# Patient Record
Sex: FEMALE | Race: WHITE | Employment: PART TIME | URBAN - METROPOLITAN AREA
[De-identification: names, ages, dates, MRNs, and addresses within clinical notes are randomized per-mention and may not be internally consistent; named-entity substitution may affect disease eponyms.]

---

## 2017-02-27 ENCOUNTER — HOSPITAL ENCOUNTER (OUTPATIENT)
Dept: MAMMOGRAPHY | Age: 72
Discharge: HOME | End: 2017-02-27
Attending: INTERNAL MEDICINE | Admitting: INTERNAL MEDICINE

## 2017-02-27 DIAGNOSIS — Z12.31 ENCOUNTER FOR SCREENING MAMMOGRAM FOR BREAST CANCER: ICD-10-CM

## 2018-03-05 ENCOUNTER — HOSPITAL ENCOUNTER (OUTPATIENT)
Dept: GENERAL RADIOLOGY | Age: 73
Discharge: HOME | End: 2018-03-05
Admitting: INTERNAL MEDICINE

## 2018-03-05 DIAGNOSIS — M81.0 AGE-RELATED OSTEOPOROSIS WITHOUT CURRENT PATHOLOGICAL FRACTURE: ICD-10-CM

## 2018-03-05 DIAGNOSIS — Z12.39 BREAST CANCER SCREENING: ICD-10-CM

## 2018-03-05 DIAGNOSIS — M81.0 AGE RELATED OSTEOPOROSIS, UNSPECIFIED PATHOLOGICAL FRACTURE PRESENCE: ICD-10-CM

## 2019-03-11 ENCOUNTER — HOSPITAL ENCOUNTER (OUTPATIENT)
Dept: WOMENS IMAGING | Age: 74
Discharge: HOME | End: 2019-03-11
Payer: MEDICARE

## 2019-03-11 DIAGNOSIS — Z12.39 BREAST CANCER SCREENING: ICD-10-CM

## 2019-03-11 PROCEDURE — 77063 BREAST TOMOSYNTHESIS BI: CPT

## 2019-10-29 ENCOUNTER — HOSPITAL ENCOUNTER (OUTPATIENT)
Age: 74
Discharge: HOME | End: 2019-10-29
Payer: MEDICARE

## 2019-10-29 ENCOUNTER — HOSPITAL ENCOUNTER (OUTPATIENT)
Dept: GENERAL RADIOLOGY | Age: 74
Discharge: HOME | End: 2019-10-29
Payer: MEDICARE

## 2019-10-29 DIAGNOSIS — M54.50 LOW BACK PAIN, UNSPECIFIED BACK PAIN LATERALITY, UNSPECIFIED CHRONICITY, UNSPECIFIED WHETHER SCIATICA PRESENT: ICD-10-CM

## 2019-10-29 PROCEDURE — 72100 X-RAY EXAM L-S SPINE 2/3 VWS: CPT

## 2020-03-20 ENCOUNTER — HOSPITAL ENCOUNTER (OUTPATIENT)
Dept: WOMENS IMAGING | Age: 75
Discharge: HOME | End: 2020-03-20
Payer: MEDICARE

## 2020-03-20 PROCEDURE — 77063 BREAST TOMOSYNTHESIS BI: CPT

## 2020-05-21 ENCOUNTER — TELEPHONE (OUTPATIENT)
Dept: ENT CLINIC | Age: 75
End: 2020-05-21

## 2020-05-21 NOTE — TELEPHONE ENCOUNTER
Please call Nguyễn Price and schedule an in office visit with me. She was referred, to me, by Dr. Zarina Hernandez and is to be scheduled with me, FAIZANP.

## 2020-05-27 ENCOUNTER — TELEPHONE (OUTPATIENT)
Dept: ENT CLINIC | Age: 75
End: 2020-05-27

## 2020-05-27 ENCOUNTER — OFFICE VISIT (OUTPATIENT)
Dept: ENT CLINIC | Age: 75
End: 2020-05-27
Payer: MEDICARE

## 2020-05-27 VITALS
HEIGHT: 60 IN | TEMPERATURE: 97.3 F | DIASTOLIC BLOOD PRESSURE: 76 MMHG | BODY MASS INDEX: 35.73 KG/M2 | HEART RATE: 76 BPM | SYSTOLIC BLOOD PRESSURE: 124 MMHG | WEIGHT: 182 LBS

## 2020-05-27 PROCEDURE — 4040F PNEUMOC VAC/ADMIN/RCVD: CPT | Performed by: OTOLARYNGOLOGY

## 2020-05-27 PROCEDURE — 99203 OFFICE O/P NEW LOW 30 MIN: CPT | Performed by: OTOLARYNGOLOGY

## 2020-05-27 PROCEDURE — 1036F TOBACCO NON-USER: CPT | Performed by: OTOLARYNGOLOGY

## 2020-05-27 PROCEDURE — G8427 DOCREV CUR MEDS BY ELIG CLIN: HCPCS | Performed by: OTOLARYNGOLOGY

## 2020-05-27 PROCEDURE — 1090F PRES/ABSN URINE INCON ASSESS: CPT | Performed by: OTOLARYNGOLOGY

## 2020-05-27 PROCEDURE — 3017F COLORECTAL CA SCREEN DOC REV: CPT | Performed by: OTOLARYNGOLOGY

## 2020-05-27 PROCEDURE — G8399 PT W/DXA RESULTS DOCUMENT: HCPCS | Performed by: OTOLARYNGOLOGY

## 2020-05-27 PROCEDURE — 1123F ACP DISCUSS/DSCN MKR DOCD: CPT | Performed by: OTOLARYNGOLOGY

## 2020-05-27 PROCEDURE — G8417 CALC BMI ABV UP PARAM F/U: HCPCS | Performed by: OTOLARYNGOLOGY

## 2020-05-27 RX ORDER — DIAZEPAM 2 MG/1
TABLET ORAL
COMMUNITY
Start: 2020-05-21 | End: 2020-06-18 | Stop reason: SDUPTHER

## 2020-05-27 RX ORDER — TRAMADOL HYDROCHLORIDE 50 MG/1
TABLET ORAL
COMMUNITY
Start: 2020-04-13

## 2020-05-27 RX ORDER — PREDNISONE 10 MG/1
TABLET ORAL
Qty: 78 TABLET | Refills: 0 | Status: SHIPPED | OUTPATIENT
Start: 2020-05-27 | End: 2020-07-15 | Stop reason: ALTCHOICE

## 2020-05-27 NOTE — PATIENT INSTRUCTIONS
ENG TESTING INSTRUCTIONS      The inner ear has two main components, one for hearing and one for equilibrium balance. In order to diagnose dizziness, we need to test both components. Toward that end, I have recommended an audiogram (hearing test) and ENG (electronystagmogram), or balance system testing. This testing will take approximately 1.5 to 2 hours. Please be in time as the audiologist runs on schedule him and your test will need to be rescheduled if you arrive after the scheduled starting time. Until your testing is completed and you have a follow up visit, please maintain these dizziness activity precautions:  1. Avoid working at [place], on ladders or scaffolding or near the edges of platforms or using heavy or complicated machinery or operating a motorized vehicle, or any activity where loss of consciousness or equilibrium would be hazardous to yourself or others, if you are experiencing dizziness, and until having been dizzy free for 72 hours. Even then, take appropriate care and precautions as dizziness could occur at any time. 2. Avoid any motions or activity that results in the off balance sensation. Stand up or arise slowly and in stages, as we discussed. YOU MUST NOT TAKE ANY OF THE FOLLOWING MEDICATIONS FOR 48 - 72 HOURS BEFORE YOUR TEST:  · Any medications that can make you drowsy or sleepy  · Allergy pills  · Sedative pills   · Tranquilizers/anti-anxiety medications (Valium, Librium, Xanax, Ativan, etc.)  · Sleeping pills   · Antihistamines/Decongestants (Benadryl, Sudafed, Dimetapp, Chlor-Trimeton)  · Pain pills/Narcotics/Barbiturates (codeine, Demerol, hydrocodone, Norco, Vicodin, oxycodone, Percocet, Percodan, OxyContin, tramadol, phenobarbital, antidepressants)      · Diet pills. · Nerve/muscle relaxant pills (Robaxin, Valium)  · Anti-dizziness pills.   (meclizine, Antivert, Bonine, ear patches, clonazepam/Klonopin, scopolamine/TRANSDERM-SCOP, etc.)  · Anti-nausea

## 2020-05-27 NOTE — PROGRESS NOTES
Diagnosis Date    GERD (gastroesophageal reflux disease)     Hyperlipidemia     Hypertension          Past Surgical History:   Procedure Laterality Date    BREAST SURGERY Right     right breast, calcification removed    HYSTERECTOMY      1995         EXAMINATION:       Vitals:    05/27/20 1327   BP: 124/76   Pulse: 76   Temp: 97.3 °F (36.3 °C)   Weight: 182 lb (82.6 kg)   Height: 5' (1.524 m)     VITALS SIGNS were reviewed. GENERAL APPEARANCE: WDWN NAD, Alert and oriented X 3. ABILITY TO COMMUNICATE/QUALITY OF VOICE:  Normal, no hoarseness or hot potato quality. SALIVARY GLANDS:  Parotid gland and submandibular gland normal bilaterally. INSPECTION, HEAD AND FACE:  Normal with no masses, lesions, tenderness, or deformities. FACIAL STRENGTH, MOTION:  Facial nerve function intact and equal bilaterally for all 5 branches. SINUSES:  Frontal sinuses and maxillary sinuses nontender, bilaterally. (+) HEARING ASSESSMENT:  Hearing was intact to finger rub at the external meatus bilaterally. Tuning fork tests, using a 512 Hz tuning fork, showed the Castillo test was not heard at all to the limit of my elbow pain. The Rinne test showed air conduction greater than bone conduction bilaterally. EXTERNAL EAR/NOSE:  The pinnae, mastoids, and external nose were normal bilaterally. (+) EARS, OTOSCOPY:  The right EAC was occluded by cerumen impaction. The left EAC contained a moderate cerumen accumulation. The left tympanic membrane and external auditory canal appeared to be were normal.  OTOMICROSCOPY:  Cerumen impaction removed from right and left EAC. The external auditory canals appeared to be normal bilaterally. The tympanic membranes appeared to be normal bilaterally, including normal pneumatic mobility. NOSE:  The nasal septum was midline. The inferior turbinates were normal bilaterally. Nasal mucosa and nasal secretions were normal bilaterally.   LIPS, TEETH AND GUMS:  Normal.  OROPHARYNX/ORAL CAVITY:

## 2020-06-17 ENCOUNTER — TELEPHONE (OUTPATIENT)
Dept: ENT CLINIC | Age: 75
End: 2020-06-17

## 2020-06-17 NOTE — TELEPHONE ENCOUNTER
Patient was seen in May, ref to dr Isabel Orozco for testing, she got her HT last week,    She is scheduled for testing with Dr Isabel Orozco July 8,  She has made her f/u appt to see Dr Carita Essex  July 15,      Previously she got her medication for dizziness  from Dr Dario Longo , before coming here    Patient is now out of medication diazepam, she is still having symtoms of vertigo , so Dr Dario Longo said she should get any further meds from Dr Carita Essex,      She started out with Meclizine,  Then she was switched to diazepam  2 mg  , she cuts the pill in half,  And takes it 2 times a day , please advise    [facility]     Pt's [phone]

## 2020-06-18 RX ORDER — DIAZEPAM 2 MG/1
2 TABLET ORAL EVERY 12 HOURS PRN
Qty: 60 TABLET | Refills: 0 | OUTPATIENT
Start: 2020-06-18 | End: 2020-07-18

## 2020-06-18 NOTE — TELEPHONE ENCOUNTER
Patient calling to check on her request    She has an appointment to see Dr Nathalie Beavers on [address],   Please advise    Ankita

## 2020-07-15 ENCOUNTER — OFFICE VISIT (OUTPATIENT)
Dept: ENT CLINIC | Age: 75
End: 2020-07-15
Payer: MEDICARE

## 2020-07-15 VITALS
SYSTOLIC BLOOD PRESSURE: 134 MMHG | HEIGHT: 60 IN | DIASTOLIC BLOOD PRESSURE: 79 MMHG | BODY MASS INDEX: 35.34 KG/M2 | WEIGHT: 180 LBS | HEART RATE: 75 BPM | TEMPERATURE: 97.6 F

## 2020-07-15 PROCEDURE — 4040F PNEUMOC VAC/ADMIN/RCVD: CPT | Performed by: OTOLARYNGOLOGY

## 2020-07-15 PROCEDURE — 99214 OFFICE O/P EST MOD 30 MIN: CPT | Performed by: OTOLARYNGOLOGY

## 2020-07-15 PROCEDURE — 1123F ACP DISCUSS/DSCN MKR DOCD: CPT | Performed by: OTOLARYNGOLOGY

## 2020-07-15 PROCEDURE — 1036F TOBACCO NON-USER: CPT | Performed by: OTOLARYNGOLOGY

## 2020-07-15 PROCEDURE — G8399 PT W/DXA RESULTS DOCUMENT: HCPCS | Performed by: OTOLARYNGOLOGY

## 2020-07-15 PROCEDURE — G8417 CALC BMI ABV UP PARAM F/U: HCPCS | Performed by: OTOLARYNGOLOGY

## 2020-07-15 PROCEDURE — 1090F PRES/ABSN URINE INCON ASSESS: CPT | Performed by: OTOLARYNGOLOGY

## 2020-07-15 PROCEDURE — 3017F COLORECTAL CA SCREEN DOC REV: CPT | Performed by: OTOLARYNGOLOGY

## 2020-07-15 PROCEDURE — G8427 DOCREV CUR MEDS BY ELIG CLIN: HCPCS | Performed by: OTOLARYNGOLOGY

## 2020-07-15 RX ORDER — FLUTICASONE PROPIONATE 50 MCG
SPRAY, SUSPENSION (ML) NASAL
Qty: 1 BOTTLE | Refills: 2 | Status: SHIPPED | OUTPATIENT
Start: 2020-07-15

## 2020-07-15 NOTE — PROGRESS NOTES
[address] [place] [address] ENT         CHIEF COMPLAINT:  Chief Complaint   Patient presents with    Dizziness       INTERVAL HISTORY:        No dizziness for about 2.5 weeks. \"I took diazepam for only one week. Every now and then I feel a little off balance. But no dizziness at all,  that's gone. My hearing is not better. My ear feels stopped up and sometimes when I swallow, it pops, but it doesn't pop all the way. AUDIOGRAM:                     ENG (see full report for details): COUNSELING:    The audiogram results were reviewed and discussed with Erick Barnett, whom I advised of the type of hearing loss, the probable etiology, possible associated impairment and disability, need for noise protection and avoidance, possible benefits of hearing aids, or other amplification therapy. I discussed the etiology of tinnitus and treatment/coping measures including masking techniques, and need for annual and prn hearing tests. Patient was advised of difficulty understanding speech in the presence of moderate to high volume background noise. The ENG results were reviewed and discussed with Erick Barnett, whom I advised of the significance of the ENG findings. I discussed the functions of the vestibular system and the probable etiology and diagnosis, vestibular dysfunction and impairment and possible disability. I discussed vestibular exercises, and possible benefits of vestibular rehabilitation therapy. I discussed activity and safety precautions, fall prevention and avoidance, and need for continued follow-up. I discussed possible etiologies of dizziness including, but not limited to: Meniere's disease, vestibular neuronitis, acute labyrinthitis, idiopathic peripheral vestibular dysfunction, benign paroxysmal positional vertigo, autoimmune hearing loss and vestibular dysfunction, multiple sclerosis, vestibular schwannoma (acoustic neuroma), CNS neoplasm, and other CNS disease.     I discussed vestibular schwannoma (acoustic neuroma), signs, symptoms, possible progression with loss of hearing, dizziness, facial nerve paralysis and compression of brain stem, and thus the need for an MRI scan of the IACs/brain to investigate for this entity. IMPRESSION / Nii Haydener / Violetta Radford / PROCEDURES:       Lynn Lepe was seen today for dizziness. Diagnoses and all orders for this visit:    Asymmetrical right sensorineural hearing loss  -     MRI IAC POSTERIOR FOSSA W WO CONTRAST    ETD (Eustachian tube dysfunction), right  -     fluticasone (FLONASE) 50 MCG/ACT nasal spray; 2 sprays in each nostril daily. Bilateral sensorineural hearing loss         RECOMMENDATIONS / PLAN:   1. See Patient Instructions on file for this visit, which were fully discussed with the patient. 2. Return in about 6 weeks (around 8/26/2020) for recheck/follow-up, and sooner if condition worsens. TIME:    Total visit time = 25 minutes; Counseling time = 15 minutes.

## 2020-07-15 NOTE — PATIENT INSTRUCTIONS
· I recommend an MRI scan of the IACs/brain, with contrast, to rule out a vestibular schwannoma (\"acoustic neuroma\") or central nervous system disease as the reason for your unilateral or asymmetric sensorineural hearing loss, tinnitus, or dizziness. There are adverse consequences of untreated acoustic neuroma, i.e. Total loss of hearing, paralysis of the face, permanent dizziness or pressure on the brain. This may occur suddenly due to bleeding into the tumor or sudden growth. · You should consider amplification therapy, hearing aid, if you are having difficulty communicating and/or hearing important sounds. I recommend bilateral hearing aids for aural rehabilitation and to aid in restoring functional hearing. You may follow up with Dr. Yohana Danielle or with another hearing aid provider of your choice. · You should return for an annual hearing test to monitor your hearing, and whenever your hearing further decreases significantly. · You should avoid exposure to excessively high levels of noise/sound and use hearing protection measures we discussed, as needed, if such exposure is unavoidable. · NO Q-TIPS IN THE EARS  You should never clean your ears with a Q-tip, cotton tipped applicator, Concepcion pin, paper clip, or any other instrument. I recommend only use of one the several ear wax removal kits available \"over the counter\" (eg. [employer] or [employer], or [employer]) if you feel a need to try to remove ear wax. No other methods should be self used for this purpose as there is danger of injury to the ear and risk of irreparable and irreversible permanent hearing loss. HOME INSTRUCTIONS - EUSTACHIAN TUBE DYSFUNCTION    Please do the following to attempt to improve your Eustachian tube dysfunction  and/or to help to prevent fluid in your middle ear:  1. Auto inflate your ears as instructed ( hold your nose closed, with your mouth closed and blow out as if blowing ear into or out of ears.   If not care for yourself at home? · To ease ear pain, apply a warm washcloth or a heating pad set on low. There may be some drainage from the ear when the heat melts earwax. Put a cloth between the heat source and your skin. Do not use a heating pad with children. · If your doctor prescribed antibiotics, take them as directed. Do not stop taking them just because you feel better. You need to take the full course of antibiotics. · Your doctor may recommend over-the-counter medicine. Be safe with medicines. Oral or nasal decongestants may relieve ear pain. Avoid decongestants that are combined with antihistamines, which tend to cause more blockage. But if allergies seem to be the problem, your doctor may recommend a combination. Be careful with cough and cold medicines. Don't give them to children younger than 6, because they don't work for children that age and can even be harmful. For children 6 and older, always follow all the instructions carefully. Make sure you know how much medicine to give and how long to use it. And use the dosing device if one is included. When should you call for help? Call your doctor now or seek immediate medical care if:  · You develop sudden, complete hearing loss. · You have severe pain or feel dizzy. · You have new or increasing pus or blood draining from your ear. · You have redness, swelling, or pain around or behind the ear. Watch closely for changes in your health, and be sure to contact your doctor if:  · You do not get better after 2 weeks. · You have any new symptoms, such as itching or a feeling of fullness in the ear. Where can you learn more? Go to https://[employer]. org and sign in to your [employer] account. Enter Y822 in the [employer] box to learn more about \"Eustachian Tube Problems: Care Instructions. \"     If you do not have an account, please click on the \"Sign Up Now\" link.   Current as of: July 29, 2019               Content

## 2020-07-21 ENCOUNTER — HOSPITAL ENCOUNTER (OUTPATIENT)
Age: 75
Discharge: HOME | End: 2020-07-21
Payer: MEDICARE

## 2020-07-21 LAB
BUN BLDV-MCNC: 17 MG/DL (ref 7–20)
CREAT SERPL-MCNC: 1 MG/DL (ref 0.6–1.2)
GFR AFRICAN AMERICAN: >60
GFR NON-AFRICAN AMERICAN: 54

## 2020-07-21 PROCEDURE — 36415 COLL VENOUS BLD VENIPUNCTURE: CPT

## 2020-07-21 PROCEDURE — 84520 ASSAY OF UREA NITROGEN: CPT

## 2020-07-21 PROCEDURE — 82565 ASSAY OF CREATININE: CPT

## 2020-07-30 ENCOUNTER — HOSPITAL ENCOUNTER (OUTPATIENT)
Dept: MRI IMAGING | Age: 75
Discharge: HOME | End: 2020-07-30
Payer: MEDICARE

## 2020-07-30 PROCEDURE — A9579 GAD-BASE MR CONTRAST NOS,1ML: HCPCS | Performed by: OTOLARYNGOLOGY

## 2020-07-30 PROCEDURE — 70553 MRI BRAIN STEM W/O & W/DYE: CPT

## 2020-07-30 PROCEDURE — 6360000004 HC RX CONTRAST MEDICATION: Performed by: OTOLARYNGOLOGY

## 2020-07-30 RX ADMIN — GADOTERIDOL 15 ML: 279.3 INJECTION, SOLUTION INTRAVENOUS at 15:04

## 2020-08-19 ENCOUNTER — OFFICE VISIT (OUTPATIENT)
Dept: ENT CLINIC | Age: 75
End: 2020-08-19
Payer: MEDICARE

## 2020-08-19 VITALS — DIASTOLIC BLOOD PRESSURE: 78 MMHG | HEART RATE: 76 BPM | TEMPERATURE: 96.8 F | SYSTOLIC BLOOD PRESSURE: 137 MMHG

## 2020-08-19 PROCEDURE — 3017F COLORECTAL CA SCREEN DOC REV: CPT | Performed by: OTOLARYNGOLOGY

## 2020-08-19 PROCEDURE — 1123F ACP DISCUSS/DSCN MKR DOCD: CPT | Performed by: OTOLARYNGOLOGY

## 2020-08-19 PROCEDURE — 1090F PRES/ABSN URINE INCON ASSESS: CPT | Performed by: OTOLARYNGOLOGY

## 2020-08-19 PROCEDURE — 4040F PNEUMOC VAC/ADMIN/RCVD: CPT | Performed by: OTOLARYNGOLOGY

## 2020-08-19 PROCEDURE — 99214 OFFICE O/P EST MOD 30 MIN: CPT | Performed by: OTOLARYNGOLOGY

## 2020-08-19 PROCEDURE — G8399 PT W/DXA RESULTS DOCUMENT: HCPCS | Performed by: OTOLARYNGOLOGY

## 2020-08-19 PROCEDURE — G8427 DOCREV CUR MEDS BY ELIG CLIN: HCPCS | Performed by: OTOLARYNGOLOGY

## 2020-08-19 PROCEDURE — G8417 CALC BMI ABV UP PARAM F/U: HCPCS | Performed by: OTOLARYNGOLOGY

## 2020-08-19 PROCEDURE — 1036F TOBACCO NON-USER: CPT | Performed by: OTOLARYNGOLOGY

## 2020-08-19 NOTE — PATIENT INSTRUCTIONS
Use Debrox (over the counter medication) 4 drops in both ears three times a day for 4 days before returning for ear cleaning.

## 2020-08-19 NOTE — PROGRESS NOTES
nontender, bilaterally. LIPS, TEETH, AND GUMS:   Normal.     OROPHARYNX/ORAL CAVITY:  Oral mucosa, hard and soft palates, tongue, and pharynx were normal.      (+) MIRROR EXAM OF NASOPHARYNX:  Visualization was suboptimal due to a strong gag reflex and guarding. No masses or polyps were appreciated. But, I was unable to visualize the nasopharynx adequately. NECK:  No masses or tenderness. The laryngeal cartilages and hyoid bone were normal, with normal laryngeal crepitus. No abnormal appearance, asymmetry or abnormal tracheal position. PALPATION OF LYMPH NODES, CERVICAL, FACIAL AND SUPRACLAVICULAR:  No lymphadenopathy. REVIEW OF IMAGES:       I independently reviewed the images of the MRI scan of the IACs/brain/head, showing no evidence of acoustic neuroma. See images and report for details. Narrative    EXAMINATION:    MRI OF THE BRAIN AND INTERNAL AUDITORY CANALS WITH AND WITHOUT CONTRAST    7/30/2020 1:44 pm      Impression    1. No acute intracranial abnormality.  No acute infarct. 2. No acute abnormality identified within the cerebellopontine angle cisterns    or internal auditory canals. 3. Minimal global parenchymal volume loss with minimal chronic microvascular    ischemic changes.               IMPRESSION / DIAGNOSES / ORDERS:       Rhianna Jenkins was seen today for dizziness. Diagnoses and all orders for this visit:    Subjective tinnitus of right ear  Comments:  popping in ear. Ear pressure, right    Bilateral sensorineural hearing loss    Asymmetrical right sensorineural hearing loss    Dizziness         RECOMMENDATIONS / PLAN:    1. Consider PE tube in right TM. 2. Consider diuretic for probable Ménière's disease. 3. Return for nasopharyngoscopy and ear cleaning if needed. Omer Good

## 2020-08-26 ENCOUNTER — OFFICE VISIT (OUTPATIENT)
Dept: ENT CLINIC | Age: 75
End: 2020-08-26
Payer: MEDICARE

## 2020-08-26 VITALS — DIASTOLIC BLOOD PRESSURE: 82 MMHG | HEART RATE: 74 BPM | TEMPERATURE: 96.9 F | SYSTOLIC BLOOD PRESSURE: 137 MMHG

## 2020-08-26 PROCEDURE — 69210 REMOVE IMPACTED EAR WAX UNI: CPT | Performed by: OTOLARYNGOLOGY

## 2020-08-26 PROCEDURE — 92511 NASOPHARYNGOSCOPY: CPT | Performed by: OTOLARYNGOLOGY

## 2020-08-26 RX ORDER — TRIAMTERENE AND HYDROCHLOROTHIAZIDE 37.5; 25 MG/1; MG/1
1 CAPSULE ORAL DAILY
Qty: 30 CAPSULE | Refills: 3 | Status: SHIPPED | OUTPATIENT
Start: 2020-08-26

## 2020-08-26 NOTE — PATIENT INSTRUCTIONS
ADVERSE AND SIDE EFFECTS OF MEDICATIONS:    Please be aware of the following possible adverse reactions, side effects, and complications of the following medications, including, but not limited to: allergic reaction, interactions with other medications, nausea, headache, diarrhea, persistent symptoms, failure to improve, and the following:      Dyazide:  Low potassium. Ans see medication information in this AVS.    ~~>>> Also please read all information given to you by the pharmacist.       Patient Education        hydrochlorothiazide and triamterene  Pronunciation:  SAM harvey klor oh THY a zide and trye AM ter een  Brand:  Dyazide, Maxzide  What is the most important information I should know about hydrochlorothiazide and triamterene? You should not use this medicine if have kidney disease, urination problems, high levels of potassium in your blood, or if you are taking other diuretics similar to triamterene. Do not use potassium supplements, salt substitutes, or low-sodium milk unless your doctor has told you to. This medicine can raise your blood potassium to dangerous levels, especially if you have kidney disease, diabetes, severe illness, or if you are an older adult. Call your doctor right away if you have signs of high potassium: nausea, slow or unusual heart rate, numbness, tingling, muscle weakness, or loss of movement in any part of your body. What is hydrochlorothiazide and triamterene? Hydrochlorothiazide is a thiazide diuretic (water pill) that helps prevent your body from absorbing too much salt, which can cause fluid retention. Triamterene is a potassium-sparing diuretic that also prevents your body from absorbing too much salt and keeps your potassium levels from getting too low. Hydrochlorothiazide and triamterene is a combination medicine used to treat fluid retention (edema) and high blood pressure (hypertension).   This medicine is usually given to people in whom other diuretics have caused hypokalemia (low potassium levels in your blood). Hydrochlorothiazide and triamterene may also be used for purposes not listed in this medication guide. What should I discuss with my healthcare provider before taking hydrochlorothiazide and triamterene? You should not use this medicine if you are allergic to hydrochlorothiazide (HCTZ, HydroDiuril, Lotensin HCT, Zestoretic, and others) or triamterene (Dyrenium), or if:  · you have kidney disease or are unable to urinate;  · you have high potassium levels (hyperkalemia);  · you are taking diuretics similar to triamterene, such as amiloride (Midamor, Moduretic), spironolactone (Aldactone, Aldactazide); or  · you are taking potassium supplements (unless your doctor tells you to). Diuretics such as triamterene can raise your blood potassium to dangerous levels. This is more likely to occur if you have kidney disease, diabetes, severe illness, or if you are an older adult. Ask your doctor about your individual risk. To make sure this medicine is safe for you, tell your doctor if you have:  · diabetes;  · cirrhosis or other liver disease;  · heart disease, heart rhythm disorder;  · gout;  · if you are on a low-salt diet;  · a history of cataracts or glaucoma;  · a history of kidney stones; or  · an allergy to sulfa drugs or penicillin. It is not known whether this medicine will harm an unborn baby. Tell your doctor if you are pregnant or plan to become pregnant. Hydrochlorothiazide and triamterene can pass into breast milk and may harm a nursing baby. You should not breast-feed while using this medicine. Hydrochlorothiazide and triamterene is not approved for use by anyone younger than 25years old. How should I take hydrochlorothiazide and triamterene? Follow all directions on your prescription label. Your doctor may occasionally change your dose. Do not use this medicine in larger or smaller amounts or for longer than recommended.   Hydrochlorothiazide and triamterene is usually taken once per day. You will need frequent blood tests to measure your potassium levels while taking this medicine,  especially when you first start taking this medicine or when your doses are changed. You may not any symptoms, but your blood work will help your doctor determine if you have high potassium (hyperkalemia). Your heart function may also need to be checked using an electrocardiograph or ECG (sometimes called an EKG). Severe illness can affect your potassium levels. Call your doctor if you have a serious illness, injury, or medical emergency. If you need surgery or medical tests, tell the doctor ahead of time that you are taking medicine that contains hydrochlorothiazide. You may need to stop using the medicine for a short time. If you are being treated for high blood pressure, keep using this medicine even if you feel well. High blood pressure often has no symptoms. You may need to use blood pressure medicine for the rest of your life. Store at room temperature away from moisture, heat, and light. What happens if I miss a dose? Take the missed dose as soon as you remember. Skip the missed dose if it is almost time for your next scheduled dose. Do not  take extra medicine to make up the missed dose. What happens if I overdose? Seek emergency medical attention or call the Poison Help line at 1-509.306.4199. Overdose symptoms may include increased urination, nausea, vomiting, weakness, fever, warmth or flushing in your face, or muscle spasms. What should I avoid while taking hydrochlorothiazide and triamterene? Do not use potassium supplements, salt substitutes, or low-sodium milk while you are taking hydrochlorothiazide and triamterene, unless your doctor has told you to. Avoid becoming overheated or dehydrated during exercise and in hot weather. Follow your doctor's instructions about the type and amount of liquids you should drink.  In some cases, drinking too much liquid can be as unsafe as not drinking enough. This medicine may impair your thinking or reactions. Be careful if you drive or do anything that requires you to be alert. Avoid a diet high in salt. Too much salt will cause your body to retain water and can make this medication less effective. What are the possible side effects of hydrochlorothiazide and triamterene? Get emergency medical help if you have signs of an allergic reaction: hives; difficulty breathing; swelling of your face, lips, tongue, or throat. Stop taking this medicine and call your doctor at once if you have:  · blurred vision, tunnel vision, eye pain, or seeing halos around lights;  · a light-headed feeling, like you might pass out;  · fast, slow, or uneven heart rate;  · dark urine, dameon-colored stools, jaundice (yellowing of the skin or eyes);  · severe pain in your upper stomach spreading to your back, nausea and vomiting;  · fever, sore throat, and headache with a severe blistering, peeling, and red skin rash;  · high potassium --nausea, slow or unusual heart rate, numbness, tingling, muscle weakness, loss of movement in any part of your body;  · low potassium --leg cramps, constipation, irregular heartbeats, fluttering in your chest, increased thirst or urination, numbness or tingling, muscle weakness or limp feeling;  · other signs of an electrolyte imbalance --thirst, dry mouth, stomach pain, drowsiness, weakness, fast heart rate, muscle pain or weakness, feeling restless or light-headed;  · kidney problems --little or no urination, painful or difficult urination, swelling in your feet or ankles, feeling tired or short of breath; or  · lupus-like syndrome --joint pain or swelling with fever, swollen glands, muscle aches, chest pain, unusual thoughts or behavior, and patchy skin color. Common side effects may include:  · stomach pain, nausea, diarrhea, constipation;  · dizziness, headache;  · blurred vision; or  · dry mouth.   This is not a complete list of side effects and others may occur. Call your doctor for medical advice about side effects. You may report side effects to FDA at 3-458-FBP-4760. What other drugs can affect hydrochlorothiazide and triamterene? Tell your doctor about all your current medicines and any you start or stop using, especially:  · all your blood pressure medications;  · amphotericin B;  · chlorpropamide;  · digoxin;  · laxatives;  · lithium;  · methenamine;  · a blood thinner;  · oral diabetes medication;  · steroid medication (prednisone and others);  · an ACE inhibitor --benazepril, captopril, enalapril, fosinopril, lisinopril, moexipril, perindopril, quinapril, ramipril, trandolapril; or  · NSAIDs (nonsteroidal anti-inflammatory drugs) --aspirin, ibuprofen (Advil, Motrin), naproxen (Aleve), celecoxib, diclofenac, indomethacin, meloxicam, and others. This list is not complete. Other drugs may interact with hydrochlorothiazide and triamterene, including prescription and over-the-counter medicines, vitamins, and herbal products. Not all possible interactions are listed in this medication guide. Where can I get more information? Your pharmacist can provide more information about hydrochlorothiazide and triamterene. Remember, keep this and all other medicines out of the reach of children, never share your medicines with others, and use this medication only for the indication prescribed. Every effort has been made to ensure that the information provided by [address] is accurate, up-to-date, and complete, but no guarantee is made to that effect. Drug information contained herein may be time sensitive. [facility] information has been compiled for use by healthcare practitioners and consumers in the [place] and therefore [facility] does not warrant that uses outside of the [place] are appropriate, unless specifically indicated otherwise.  [facility]'s drug information does not endorse drugs, diagnose patients or recommend therapy. [facility]'s drug information is an informational resource designed to assist licensed healthcare practitioners in caring for their patients and/or to serve consumers viewing this service as a supplement to, and not a substitute for, the expertise, skill, knowledge and judgment of healthcare practitioners. The absence of a warning for a given drug or drug combination in no way should be construed to indicate that the drug or drug combination is safe, effective or appropriate for any given patient. [facility] does not assume any responsibility for any aspect of healthcare administered with the aid of information [facility] provides. The information contained herein is not intended to cover all possible uses, directions, precautions, warnings, drug interactions, allergic reactions, or adverse effects. If you have questions about the drugs you are taking, check with your doctor, nurse or pharmacist.  Copyright 4426-9708 [address]. Version: 11.01. Revision date: 2/17/2017. Care instructions adapted under license by [facility] ([facility]). If you have questions about a medical condition or this instruction, always ask your healthcare professional. [employer] disclaims any warranty or ambulate about the outer part liability for your use of this information.

## 2020-08-26 NOTE — PROGRESS NOTES
instrumentation, using a Billeau wire loop and a Wells suction. After successful cerumen removal, the EACs appeared to be normal and clear bilaterally without mass, exudate, or edema. The tympanic membranes appeared to be normal. There was no evidence of acute disease. [facility] reported improved hearing, back to usual level, after cerumen removal.            IMPRESSION / Terence Grounds / Marcelino Bombard / PROCEDURES:       [facility] was seen today for ear problem. 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 Orders:  -     triamterene-hydroCHLOROthiazide (DYAZIDE) 37.5-25 MG per capsule; Take 1 capsule by mouth daily    Bilateral sensorineural hearing loss         RECOMMENDATIONS / PLAN:   1. Dyazide, if okay with Dr. Biju Carrero. 2. See Patient Instructions on file for this visit. 3. Return in about 6 weeks (around 10/7/2020) for recheck/follow-up, and sooner if condition worsens.

## 2020-08-27 ENCOUNTER — TELEPHONE (OUTPATIENT)
Dept: ENT CLINIC | Age: 75
End: 2020-08-27

## 2020-08-27 PROBLEM — H91.21 SUDDEN IDIOPATHIC HEARING LOSS OF RIGHT EAR WITH RESTRICTED HEARING OF LEFT EAR: Status: ACTIVE | Noted: 2020-08-27

## 2020-08-27 PROBLEM — H93.8X1 EAR PRESSURE, RIGHT: Chronic | Status: ACTIVE | Noted: 2020-08-27

## 2020-08-27 PROBLEM — H90.3 BILATERAL SENSORINEURAL HEARING LOSS: Status: ACTIVE | Noted: 2020-08-27

## 2020-08-27 NOTE — TELEPHONE ENCOUNTER
Pt spoke to  PCP about taking water pills, the doctor would like to know how many mg the pill and how frequently it will be taken so she can decide wether or not the pt can take it

## 2020-08-31 ENCOUNTER — TELEPHONE (OUTPATIENT)
Dept: ENT CLINIC | Age: 75
End: 2020-08-31

## 2020-09-20 PROBLEM — D37.05 NEOPLASM OF UNCERTAIN BEHAVIOR OF NASOPHARYNX: Status: ACTIVE | Noted: 2020-09-20

## 2020-09-20 PROBLEM — H69.93 EUSTACHIAN TUBE DYSFUNCTION, BILATERAL: Status: ACTIVE | Noted: 2020-09-20

## 2020-09-20 PROBLEM — H90.0 CONDUCTIVE HEARING LOSS OF BOTH EARS: Status: ACTIVE | Noted: 2020-09-20

## 2020-09-20 PROBLEM — H69.83 EUSTACHIAN TUBE DYSFUNCTION, BILATERAL: Status: ACTIVE | Noted: 2020-09-20

## 2020-09-20 PROBLEM — H61.23 IMPACTED CERUMEN OF BOTH EARS: Status: ACTIVE | Noted: 2020-09-20

## 2021-05-06 ENCOUNTER — HOSPITAL ENCOUNTER (OUTPATIENT)
Dept: WOMENS IMAGING | Age: 76
Discharge: HOME | End: 2021-05-06
Payer: MEDICARE

## 2021-05-06 DIAGNOSIS — Z12.31 VISIT FOR SCREENING MAMMOGRAM: ICD-10-CM

## 2021-05-06 PROCEDURE — 77063 BREAST TOMOSYNTHESIS BI: CPT

## 2021-06-15 ENCOUNTER — CLINICAL DOCUMENTATION (OUTPATIENT)
Dept: OTHER | Age: 76
End: 2021-06-15

## 2021-08-03 ENCOUNTER — HOSPITAL ENCOUNTER (OUTPATIENT)
Dept: CT IMAGING | Age: 76
Discharge: HOME | End: 2021-08-03
Payer: MEDICARE

## 2021-08-03 DIAGNOSIS — M48.061 SPINAL STENOSIS, LUMBAR REGION, WITHOUT NEUROGENIC CLAUDICATION: ICD-10-CM

## 2021-08-03 PROCEDURE — 72131 CT LUMBAR SPINE W/O DYE: CPT

## 2022-01-26 ENCOUNTER — HOSPITAL ENCOUNTER (EMERGENCY)
Age: 77
Discharge: HOME | End: 2022-01-26
Payer: MEDICARE

## 2022-01-26 VITALS
TEMPERATURE: 98.3 F | HEIGHT: 61 IN | HEART RATE: 91 BPM | SYSTOLIC BLOOD PRESSURE: 128 MMHG | BODY MASS INDEX: 32.1 KG/M2 | WEIGHT: 170 LBS | DIASTOLIC BLOOD PRESSURE: 85 MMHG | OXYGEN SATURATION: 96 % | RESPIRATION RATE: 18 BRPM

## 2022-01-26 DIAGNOSIS — R04.0 EPISTAXIS: Primary | ICD-10-CM

## 2022-01-26 PROCEDURE — 2500000003 HC RX 250 WO HCPCS

## 2022-01-26 PROCEDURE — 99285 EMERGENCY DEPT VISIT HI MDM: CPT

## 2022-01-26 PROCEDURE — 30901 CONTROL OF NOSEBLEED: CPT

## 2022-01-26 PROCEDURE — 6370000000 HC RX 637 (ALT 250 FOR IP)

## 2022-01-26 RX ORDER — ONDANSETRON 4 MG/1
TABLET, ORALLY DISINTEGRATING ORAL
Status: COMPLETED
Start: 2022-01-26 | End: 2022-01-26

## 2022-01-26 RX ORDER — TRANEXAMIC ACID 100 MG/ML
INJECTION, SOLUTION INTRAVENOUS
Status: COMPLETED
Start: 2022-01-26 | End: 2022-01-26

## 2022-01-26 RX ORDER — OXYMETAZOLINE HYDROCHLORIDE 0.05 G/100ML
SPRAY NASAL
Status: COMPLETED
Start: 2022-01-26 | End: 2022-01-26

## 2022-01-26 RX ADMIN — ONDANSETRON 8 MG: 4 TABLET, ORALLY DISINTEGRATING ORAL at 22:37

## 2022-01-26 RX ADMIN — TRANEXAMIC ACID: 1 INJECTION, SOLUTION INTRAVENOUS at 22:37

## 2022-01-26 RX ADMIN — OXYMETAZOLINE HYDROCHLORIDE: 5 SPRAY NASAL at 22:37

## 2022-01-27 NOTE — ED NOTES
Bed: [facility]-04  Expected date:   Expected time:   Means of arrival:   Comments:  Leonardo Montemayor RN  01/26/22 2581

## 2022-01-27 NOTE — ED PROVIDER NOTES
[address] [facility]        Pt Name: Erasmo Luna  MRN: [id number]  [place] 1945  Date of evaluation: 1/26/2022  Provider: Scooter Gayle PA-C  PCP: Bailey Mathews MD  Note Started: 11:09 PM EST       VIJAY. I have evaluated this patient. My supervising physician was available for consultation. CHIEF COMPLAINT       Chief Complaint   Patient presents with    Epistaxis     From home via [facility]. Nosebleed X3 hours, running down back of throat, bleeding from left eye. Vomited some blood clots prior to arrival. No blood thinners other than daily ibuprofen. Hx of HTN. HISTORY OF PRESENT ILLNESS   (Location, Timing/Onset, Context/Setting, Quality, Duration, Modifying Factors, Severity, Associated Signs and Symptoms)  Note limiting factors. Chief Complaint: Epistaxis    Erasmo Luna is a 77 y.o. female who presents to the emergency department with a chief complaint of epistaxis. This began after she blew her nose about 3 hours before presenting to the emergency department. She denies any pain, injury or trauma. States she is feeling slightly nauseous and has been spitting up some blood clots. Denies actual vomiting. Denies shortness of breath, chest pain, dizziness, hematuria, bloody stools or any bleeding anywhere else. Denies pain or any other symptoms. She states this is all coming from the left and actually some was coming out of her left eye. Nursing Notes were all reviewed and agreed with or any disagreements were addressed in the HPI. REVIEW OF SYSTEMS    (2-9 systems for level 4, 10 or more for level 5)     Review of Systems    Positives and Pertinent negatives as per HPI. Except as noted above in the ROS, all other systems were reviewed and negative.        PAST MEDICAL HISTORY     Past Medical History:   Diagnosis Date    GERD (gastroesophageal reflux disease)     Hyperlipidemia     Hypertension SURGICAL HISTORY     Past Surgical History:   Procedure Laterality Date    BREAST SURGERY Right     right breast, calcification removed    HYSTERECTOMY      1995         CURRENTMEDICATIONS       Previous Medications    AMLODIPINE (NORVASC) 5 MG TABLET    Take 1 tablet by mouth daily. CHOLECALCIFEROL (VITAMIN D3 PO)    Take by mouth    FERROUS SULFATE (IRON PO)    Take by mouth    FLUTICASONE (FLONASE) 50 MCG/ACT NASAL SPRAY    2 sprays in each nostril daily. GABAPENTIN PO    Take by mouth    IBUPROFEN (ADVIL;MOTRIN) 800 MG TABLET    Take 800 mg by mouth every 6 hours as needed. LATANOPROST OP    Apply to eye    LISINOPRIL (PRINIVIL;ZESTRIL) 20 MG TABLET    Take 40 mg by mouth nightly. LORATADINE 10 MG CAPS    Take 0.5 tablets by mouth as needed. MAGNESIUM OXIDE PO    Take by mouth    OMEPRAZOLE (PRILOSEC) 20 MG CAPSULE    Take 1 capsule by mouth 2 times daily. PREDNISONE PO    Take by mouth    TERAZOSIN HCL PO    Take by mouth    TRAMADOL (ULTRAM) 50 MG TABLET        TRAZODONE (DESYREL) 100 MG TABLET    Take 100 mg by mouth nightly.     TRIAMTERENE-HYDROCHLOROTHIAZIDE (DYAZIDE) 37.5-25 MG PER CAPSULE    Take 1 capsule by mouth daily         ALLERGIES     No known allergies    FAMILYHISTORY       Family History   Problem Relation Age of Onset    High Cholesterol Mother     Glaucoma Mother     Stroke Mother           SOCIAL HISTORY       Social History     Tobacco Use    Smoking status: Never Smoker    Smokeless tobacco: Never Used   Vaping Use    Vaping Use: Never used   Substance Use Topics    Alcohol use: Not Currently    Drug use: No       SCREENINGS    [place] Coma Scale  Eye Opening: Spontaneous  Best Verbal Response: Oriented  Best Motor Response: Obeys commands  [place] Coma Scale Score: 15        PHYSICAL EXAM    (up to 7 for level 4, 8 or more for level 5)     ED Triage Vitals [01/26/22 2159]   BP Temp Temp Source Pulse Resp SpO2 Height Weight   128/85 98.3 °F (36.8 °C) Oral 91 18 96 % 5' 1\" (1.549 m) 170 lb (77.1 kg)       Physical Exam  Vitals and nursing note reviewed. Constitutional:       Appearance: She is well-developed. She is not diaphoretic. HENT:      Head: Atraumatic. Nose:      Comments: There are some blood noted bilateral nostrils. Clip was removed and there is some blood actually comes from the right nostril. Eyes:      General:         Right eye: No discharge. Left eye: No discharge. Cardiovascular:      Rate and Rhythm: Normal rate and regular rhythm. Heart sounds: Normal heart sounds. No murmur heard. No gallop. Pulmonary:      Effort: Pulmonary effort is normal.      Breath sounds: Normal breath sounds. No stridor. No wheezing, rhonchi or rales. Musculoskeletal:      Cervical back: Normal range of motion. Skin:     General: Skin is warm and dry. Findings: No erythema or rash. Neurological:      Mental Status: She is alert and oriented to person, place, and time. Cranial Nerves: No cranial nerve deficit. Psychiatric:         Behavior: Behavior normal.         DIAGNOSTIC RESULTS   LABS:    Labs Reviewed - No data to display    When ordered only abnormal lab results are displayed. All other labs were within normal range or not returned as of this dictation. EKG: When ordered, EKG's are interpreted by the Emergency Department Physician in the absence of a cardiologist.  Please see their note for interpretation of EKG. RADIOLOGY:   Non-plain film images such as CT, Ultrasound and MRI are read by the radiologist. Plain radiographic images are visualized and preliminarily interpreted by the ED Provider with the below findings:        Interpretation per the Radiologist below, if available at the time of this note:    No orders to display     No results found. PROCEDURES   Unless otherwise noted below, none     Procedures   Verbal consent was obtained from patient for epistaxis management.   2 sprays of Afrin were placed in both nostrils and patient blew her nose evacuating clots from both nostrils. Another spray was placed in both nostrils bilaterally and again patient evacuated some more clots. Some TXA then was sprayed in bilateral nostrils. She does have some bleeding noted in the posterior pharynx but I do not visualize any active bleeding now moderate either nostril. Nose clip was placed for 20 minutes. This was taken off and she has no further bleeding. She was observed for another 30 minutes after this and still has no further nosebleed. There is a very small clot noted to the posterior pharynx but no active bleeding. I ambulated the patient and she has no further bleeding is feeling fine at this time. Good hemostasis. CRITICAL CARE TIME       CONSULTS:  None      EMERGENCY DEPARTMENT COURSE and DIFFERENTIAL DIAGNOSIS/MDM:   Vitals:    Vitals:    01/26/22 2159   BP: 128/85   Pulse: 91   Resp: 18   Temp: 98.3 °F (36.8 °C)   TempSrc: Oral   SpO2: 96%   Weight: 170 lb (77.1 kg)   Height: 5' 1\" (1.549 m)       Patient was given the following medications:  Medications   oxymetazoline (AFRIN) 0.05 % nasal spray (has no administration in time range)   tranexamic acid (CYKLOKAPRON) 1000 MG/10ML injection (has no administration in time range)   ondansetron (ZOFRAN-ODT) 4 MG disintegrating tablet (has no administration in time range)           Patient presented with epistaxis. Initially was planning for Rhino Rocket placement but after Afrin and TXA she has no further bleeding. She states most of the bleeding was coming from the left side but she did have clots her to both right and left nostril here. She does not have any bleeding for an hour after medication has been given without any clip. She has been able to walk around here. She will follow-up with ENT. She is educated on what to do if bleeding begins again. Return here for any worsening of symptoms or problems at home.     FINAL IMPRESSION      1. Epistaxis          DISPOSITION/PLAN   DISPOSITION Decision To Discharge 01/26/2022 11:34:18 PM      PATIENT REFERRED TO:  Nick Ochoa,   [address]  [phone]    Schedule an appointment as soon as possible for a visit   As needed    [facility]  [address] [facility]  [phone]    As needed      DISCHARGE MEDICATIONS:  New Prescriptions    No medications on file       DISCONTINUED MEDICATIONS:  Discontinued Medications    No medications on file              (Please note that portions of this note were completed with a voice recognition program.  Efforts were made to edit the dictations but occasionally words are mis-transcribed.)    Anastasiia Olivas PA-C (electronically signed)            Anastasiia Olivas PA-C  01/26/22 6169

## 2022-02-24 DIAGNOSIS — D50.0 IRON DEFICIENCY ANEMIA SECONDARY TO BLOOD LOSS (CHRONIC): ICD-10-CM

## 2022-02-24 DIAGNOSIS — K92.2 CHRONIC GI BLEEDING: ICD-10-CM

## 2022-02-24 DIAGNOSIS — D51.9 ANEMIA DUE TO VITAMIN B12 DEFICIENCY, UNSPECIFIED B12 DEFICIENCY TYPE: ICD-10-CM

## 2022-02-24 RX ORDER — SODIUM CHLORIDE 0.9 % (FLUSH) 0.9 %
5-40 SYRINGE (ML) INJECTION PRN
Status: CANCELLED | OUTPATIENT
Start: 2022-02-24

## 2022-02-24 RX ORDER — SODIUM CHLORIDE 9 MG/ML
INJECTION, SOLUTION INTRAVENOUS CONTINUOUS
Status: CANCELLED | OUTPATIENT
Start: 2022-02-24

## 2022-02-24 RX ORDER — CYANOCOBALAMIN 1000 UG/ML
1000 INJECTION INTRAMUSCULAR; SUBCUTANEOUS ONCE
Status: CANCELLED
Start: 2022-02-24 | End: 2022-02-24

## 2022-02-24 RX ORDER — SODIUM CHLORIDE 9 MG/ML
INJECTION, SOLUTION INTRAVENOUS CONTINUOUS
OUTPATIENT
Start: 2022-02-24

## 2022-02-24 RX ORDER — SODIUM CHLORIDE 0.9 % (FLUSH) 0.9 %
5-40 SYRINGE (ML) INJECTION PRN
OUTPATIENT
Start: 2022-02-24

## 2022-02-24 RX ORDER — CYANOCOBALAMIN 1000 UG/ML
1000 INJECTION INTRAMUSCULAR; SUBCUTANEOUS ONCE
Start: 2022-02-24 | End: 2022-02-24

## 2022-02-25 ENCOUNTER — CLINICAL DOCUMENTATION (OUTPATIENT)
Dept: ONCOLOGY | Age: 77
End: 2022-02-25

## 2022-03-03 ENCOUNTER — CLINICAL DOCUMENTATION (OUTPATIENT)
Dept: ONCOLOGY | Age: 77
End: 2022-03-03

## 2022-06-07 ENCOUNTER — HOSPITAL ENCOUNTER (OUTPATIENT)
Dept: WOMENS IMAGING | Age: 77
Discharge: HOME | End: 2022-06-07
Payer: MEDICARE

## 2022-06-07 VITALS — BODY MASS INDEX: 33.04 KG/M2 | HEIGHT: 61 IN | WEIGHT: 175 LBS

## 2022-06-07 DIAGNOSIS — Z12.31 VISIT FOR SCREENING MAMMOGRAM: ICD-10-CM

## 2022-06-07 PROCEDURE — 77067 SCR MAMMO BI INCL CAD: CPT

## 2022-07-25 ENCOUNTER — HOSPITAL ENCOUNTER (OUTPATIENT)
Dept: GENERAL RADIOLOGY | Age: 77
Discharge: HOME | End: 2022-07-25
Payer: MEDICARE

## 2022-07-25 DIAGNOSIS — Z78.0 POSTMENOPAUSAL: ICD-10-CM

## 2022-07-25 PROCEDURE — 77080 DXA BONE DENSITY AXIAL: CPT

## 2022-11-13 ENCOUNTER — HOSPITAL ENCOUNTER (EMERGENCY)
Age: 77
Discharge: HOME | End: 2022-11-13
Payer: MEDICARE

## 2022-11-13 VITALS
RESPIRATION RATE: 18 BRPM | WEIGHT: 170 LBS | DIASTOLIC BLOOD PRESSURE: 59 MMHG | BODY MASS INDEX: 32.12 KG/M2 | HEART RATE: 84 BPM | SYSTOLIC BLOOD PRESSURE: 123 MMHG | TEMPERATURE: 98.3 F | OXYGEN SATURATION: 97 %

## 2022-11-13 DIAGNOSIS — M54.50 ACUTE EXACERBATION OF CHRONIC LOW BACK PAIN: Primary | ICD-10-CM

## 2022-11-13 DIAGNOSIS — G89.29 ACUTE EXACERBATION OF CHRONIC LOW BACK PAIN: Primary | ICD-10-CM

## 2022-11-13 PROCEDURE — 99284 EMERGENCY DEPT VISIT MOD MDM: CPT

## 2022-11-13 PROCEDURE — 96372 THER/PROPH/DIAG INJ SC/IM: CPT

## 2022-11-13 PROCEDURE — 6360000002 HC RX W HCPCS: Performed by: PHYSICIAN ASSISTANT

## 2022-11-13 PROCEDURE — 6370000000 HC RX 637 (ALT 250 FOR IP): Performed by: PHYSICIAN ASSISTANT

## 2022-11-13 RX ORDER — LIDOCAINE 4 G/G
1 PATCH TOPICAL ONCE
Status: DISCONTINUED | OUTPATIENT
Start: 2022-11-13 | End: 2022-11-13 | Stop reason: HOSPADM

## 2022-11-13 RX ORDER — FENTANYL CITRATE 50 UG/ML
25 INJECTION, SOLUTION INTRAMUSCULAR; INTRAVENOUS ONCE
Status: COMPLETED | OUTPATIENT
Start: 2022-11-13 | End: 2022-11-13

## 2022-11-13 RX ORDER — CYCLOBENZAPRINE HCL 10 MG
10 TABLET ORAL 3 TIMES DAILY PRN
Qty: 20 TABLET | Refills: 0 | Status: SHIPPED | OUTPATIENT
Start: 2022-11-13

## 2022-11-13 RX ORDER — LIDOCAINE 50 MG/G
1 PATCH TOPICAL DAILY
Qty: 30 PATCH | Refills: 0 | Status: SHIPPED | OUTPATIENT
Start: 2022-11-13

## 2022-11-13 RX ORDER — CYCLOBENZAPRINE HCL 10 MG
10 TABLET ORAL ONCE
Status: COMPLETED | OUTPATIENT
Start: 2022-11-13 | End: 2022-11-13

## 2022-11-13 RX ADMIN — CYCLOBENZAPRINE 10 MG: 10 TABLET, FILM COATED ORAL at 12:15

## 2022-11-13 RX ADMIN — FENTANYL CITRATE 25 MCG: 0.05 INJECTION, SOLUTION INTRAMUSCULAR; INTRAVENOUS at 12:16

## 2022-11-13 ASSESSMENT — ENCOUNTER SYMPTOMS
COUGH: 0
VOMITING: 0
STRIDOR: 0
COLOR CHANGE: 0
DIARRHEA: 0
WHEEZING: 0
ABDOMINAL PAIN: 0
SHORTNESS OF BREATH: 0
CONSTIPATION: 0
RECTAL PAIN: 0
NAUSEA: 0
BACK PAIN: 1

## 2022-11-13 ASSESSMENT — PAIN DESCRIPTION - LOCATION
LOCATION: BACK

## 2022-11-13 ASSESSMENT — PAIN SCALES - GENERAL
PAINLEVEL_OUTOF10: 10
PAINLEVEL_OUTOF10: 10
PAINLEVEL_OUTOF10: 5

## 2022-11-13 ASSESSMENT — PAIN DESCRIPTION - PAIN TYPE: TYPE: CHRONIC PAIN

## 2022-11-13 ASSESSMENT — PAIN - FUNCTIONAL ASSESSMENT: PAIN_FUNCTIONAL_ASSESSMENT: 0-10

## 2022-11-13 NOTE — ED PROVIDER NOTES
[address] [facility]        Pt Name: Denita Sparks  MRN: [id number]  [place] 1945  Date of evaluation: 11/13/2022  Provider: Tristan Ash PA-C  PCP: Juan Taveras DO  Note Started: 12:07 PM EST 11/13/2022        VIJAY. I have evaluated this patient. My supervising physician was available for consultation. CHIEF COMPLAINT       Chief Complaint   Patient presents with    Back Pain     Pt reports severe back pain that is chronic in nature        HISTORY OF PRESENT ILLNESS   (Location, Timing/Onset, Context/Setting, Quality, Duration, Modifying Factors, Severity, Associated Signs and Symptoms)  Note limiting factors. Chief Complaint: Acute exacerbation of chronic low back pain    Denita Sparks is a 77 y.o. female who presents to the emergency department complaining of acute exacerbation of chronic low back pain. She has known severe arthritis in her low back. She sees an orthopedist at [facility] who has been doing trigger point injections in the low back. She states that she has to go through 6 rounds of physical therapy before insurance will pay for epidural injection. She takes tramadol and Norco for chronic pain. PCP prescribes this medicine to her. She denies any new injury. Denies recent instrumentation of spine. Denies numbness, tingling, weakness or radiation of pain. She rates her pain to be a 10 out of 10 on pain scale. Denies any urinary complaints, bowel or bladder incontinence or retention. She was able to ambulate into her friend's car to drive to the emergency department. She does live by herself. She does not want to be admitted in the hospital.  She simply wants some pain relief here in the emergency department. She already had recent low back films. Nursing Notes were all reviewed and agreed with or any disagreements were addressed in the HPI.     REVIEW OF SYSTEMS    (2-9 systems for level 4, 10 or more for level 5)     Review of Systems   Constitutional:  Negative for chills and fever. HENT: Negative. Eyes:  Negative for visual disturbance. Respiratory:  Negative for cough, shortness of breath, wheezing and stridor. Cardiovascular:  Negative for chest pain, palpitations and leg swelling. Gastrointestinal:  Negative for abdominal pain, constipation, diarrhea, nausea, rectal pain and vomiting. Endocrine: Negative. Genitourinary: Negative. Musculoskeletal:  Positive for back pain. Negative for neck pain and neck stiffness. Skin:  Negative for color change, pallor, rash and wound. Neurological: Negative. Psychiatric/Behavioral:  Negative for confusion. All other systems reviewed and are negative. Positives and Pertinent negatives as per HPI. Except as noted above in the ROS, all other systems were reviewed and negative. PAST MEDICAL HISTORY     Past Medical History:   Diagnosis Date    GERD (gastroesophageal reflux disease)     Hyperlipidemia     Hypertension          SURGICAL HISTORY     Past Surgical History:   Procedure Laterality Date    BREAST SURGERY Right     right breast, calcification removed    HYSTERECTOMY (CERVIX STATUS UNKNOWN)      1995         CURRENTMEDICATIONS       Discharge Medication List as of 11/13/2022  1:21 PM        CONTINUE these medications which have NOT CHANGED    Details   TERAZOSIN HCL PO Take by mouthHistorical Med      PREDNISONE PO Take by mouthHistorical Med      LATANOPROST OP Apply to eyeHistorical Med      GABAPENTIN PO Take by mouthHistorical Med      Cholecalciferol (VITAMIN D3 PO) Take by mouthHistorical Med      Ferrous Sulfate (IRON PO) Take by mouthHistorical Med      MAGNESIUM OXIDE PO Take by mouthHistorical Med      triamterene-hydroCHLOROthiazide (DYAZIDE) 37.5-25 MG per capsule Take 1 capsule by mouth daily, Disp-30 capsule,R-3Normal      fluticasone (FLONASE) 50 MCG/ACT nasal spray 2 sprays in each nostril daily. , Disp-1 Bottle,R-2Normal      traMADol (ULTRAM) 50 MG tablet Historical Med      Loratadine 10 MG CAPS Take 0.5 tablets by mouth as needed. amLODIPine (NORVASC) 5 MG tablet Take 1 tablet by mouth daily. omeprazole (PRILOSEC) 20 MG capsule Take 1 capsule by mouth 2 times daily. traZODone (DESYREL) 100 MG tablet Take 100 mg by mouth nightly. lisinopril (PRINIVIL;ZESTRIL) 20 MG tablet Take 40 mg by mouth nightly. ibuprofen (ADVIL;MOTRIN) 800 MG tablet Take 800 mg by mouth every 6 hours as needed. ALLERGIES     No known allergies    FAMILYHISTORY       Family History   Problem Relation Age of Onset    High Cholesterol Mother     Glaucoma Mother     Stroke Mother           SOCIAL HISTORY       Social History     Tobacco Use    Smoking status: Never    Smokeless tobacco: Never   Vaping Use    Vaping Use: Never used   Substance Use Topics    Alcohol use: Not Currently    Drug use: No       SCREENINGS    Crescent Coma Scale  Eye Opening: Spontaneous  Best Verbal Response: Oriented  Best Motor Response: Obeys commands  Frankie Coma Scale Score: 15        PHYSICAL EXAM    (up to 7 for level 4, 8 or more for level 5)     ED Triage Vitals   BP Temp Temp src Heart Rate Resp SpO2 Height Weight   11/13/22 1142 11/13/22 1140 -- 11/13/22 1140 11/13/22 1140 11/13/22 1140 -- 11/13/22 1140   94/68 98.3 °F (36.8 °C)  88 18 98 %  170 lb (77.1 kg)       Physical Exam  Vitals and nursing note reviewed. Constitutional:       Appearance: Normal appearance. She is well-developed. She is obese. She is not toxic-appearing or diaphoretic. HENT:      Head: Normocephalic and atraumatic. Right Ear: External ear normal.      Left Ear: External ear normal.      Nose: Nose normal.      Mouth/Throat:      Mouth: Mucous membranes are moist.      Pharynx: Oropharynx is clear. Eyes:      General: No scleral icterus. Right eye: No discharge. Left eye: No discharge.       Extraocular Movements: Extraocular movements intact. Conjunctiva/sclera: Conjunctivae normal.      Pupils: Pupils are equal, round, and reactive to light. Cardiovascular:      Rate and Rhythm: Normal rate. Pulses:           Femoral pulses are 2+ on the right side and 2+ on the left side. Posterior tibial pulses are 2+ on the right side and 2+ on the left side. Pulmonary:      Effort: Pulmonary effort is normal.      Breath sounds: Normal breath sounds. Abdominal:      General: Bowel sounds are normal. There is no distension or abdominal bruit. Palpations: Abdomen is soft. There is no pulsatile mass. Tenderness: There is no abdominal tenderness. There is no right CVA tenderness, left CVA tenderness, guarding or rebound. Negative signs include Morrison's sign, Rovsing's sign, McBurney's sign, psoas sign and obturator sign. Musculoskeletal:      Cervical back: Normal and normal range of motion. Thoracic back: Normal.      Lumbar back: Spasms and tenderness present. No swelling, edema, deformity, signs of trauma, lacerations or bony tenderness. Decreased range of motion. Negative right straight leg raise test and negative left straight leg raise test. No scoliosis. Comments: No midline vertebral tenderness or step-off deformity. Negative straight leg raise. No saddle paresthesia or foot drop. Able to dorsiflex and plantarflex without difficulty or deficit. 5/5 strength in all four extremities without focal weakness, paresthesia or radiculopathy  Able to ambulate     Skin:     General: Skin is warm and dry. Capillary Refill: Capillary refill takes less than 2 seconds. Coloration: Skin is not jaundiced or pale. Findings: No bruising, erythema, lesion or rash. Neurological:      General: No focal deficit present. Mental Status: She is alert and oriented to person, place, and time. Sensory: No sensory deficit. Motor: No weakness.       Deep Tendon Reflexes: Reflexes normal.   Psychiatric:         Behavior: Behavior normal.       DIAGNOSTIC RESULTS   LABS:    Labs Reviewed - No data to display    When ordered only abnormal lab results are displayed. All other labs were within normal range or not returned as of this dictation. EKG: When ordered, EKG's are interpreted by the Emergency Department Physician in the absence of a cardiologist.  Please see their note for interpretation of EKG. RADIOLOGY:   Non-plain film images such as CT, Ultrasound and MRI are read by the radiologist. Plain radiographic images are visualized and preliminarily interpreted by the ED Provider with the below findings:        Interpretation per the Radiologist below, if available at the time of this note:    No orders to display     No results found. PROCEDURES   Unless otherwise noted below, none     Procedures    CRITICAL CARE TIME   N/a    CONSULTS:  None      EMERGENCY DEPARTMENT COURSE and DIFFERENTIAL DIAGNOSIS/MDM:   Vitals:    Vitals:    11/13/22 1140 11/13/22 1142 11/13/22 1226   BP:  94/68 (!) 123/59   Pulse: 88  84   Resp: 18  18   Temp: 98.3 °F (36.8 °C)     SpO2: 98%  97%   Weight: 170 lb (77.1 kg)         Patient was given the following medications:  Medications   lidocaine 4 % external patch 1 patch (1 patch TransDERmal Patch Applied 11/13/22 1222)   fentaNYL (SUBLIMAZE) injection 25 mcg (25 mcg IntraMUSCular Given 11/13/22 1216)   cyclobenzaprine (FLEXERIL) tablet 10 mg (10 mg Oral Given 11/13/22 1215)         Is this patient to be included in the SEP-1 Core Measure due to severe sepsis or septic shock? No   Exclusion criteria - the patient is NOT to be included for SEP-1 Core Measure due to: Infection is not suspected    This patient presents complaining of acute exacerbation of chronic low back pain. She is able to ambulate. She has no radiculopathy, weakness or paresthesia. Denies bowel or bladder incontinence or retention. She is already on narcotic medication.   I will add muscle relaxant and lidocaine patches to the regimen. I did offer her admission for pain control, which patient declines. I did give her referral to [facility] spine and physical therapy. She was given strict return precautions. She is otherwise stable for discharge. She already had recent outpatient imaging. Patient and I both agree no need for emergent imaging in the ED. My suspicion is low for acute spine fracture or dislocation, epidural abscess or hematoma, discitis, meningitis, encephalitis, transverse myelitis, cauda quina,  pyelonephritis, perinephric abscess, urosepsis, kidney stone, AAA, dissection, shingles, or other concerning pathology. FINAL IMPRESSION      1.  Acute exacerbation of chronic low back pain          DISPOSITION/PLAN   DISPOSITION Decision To Discharge 11/13/2022 01:21:59 PM      PATIENT REFERRED TO:  Essie Decker, [address] [place] [id number]  [phone]      follow up in 1-3 days    [facility] Emergency Department  [address] [facility]  [phone]    If symptoms worsen    [address] [facility] [address] [facility]  [phone]    or follow up with your orthopedist    [facility] Physical Therapy  [address] [place] [address]  [phone]        DISCHARGE MEDICATIONS:  Discharge Medication List as of 11/13/2022  1:21 PM        START taking these medications    Details   cyclobenzaprine (FLEXERIL) 10 MG tablet Take 1 tablet by mouth 3 times daily as needed for Muscle spasms, Disp-20 tablet, R-0Normal      lidocaine (LIDODERM) 5 % Place 1 patch onto the skin daily 12 hours on, 12 hours off., Disp-30 patch, R-0Normal             DISCONTINUED MEDICATIONS:  Discharge Medication List as of 11/13/2022  1:21 PM                 (Please note that portions of this note were completed with a voice recognition program.  Efforts were made to edit the dictations but occasionally words are mis-transcribed.)    Giacomo Lucas PA-C (electronically signed)            Giacomo Lucas PA-C  11/13/22 [id number]

## 2023-04-03 ENCOUNTER — OFFICE VISIT (OUTPATIENT)
Dept: ENT CLINIC | Age: 78
End: 2023-04-03
Payer: MEDICARE

## 2023-04-03 VITALS
TEMPERATURE: 97.4 F | HEIGHT: 61 IN | WEIGHT: 160 LBS | BODY MASS INDEX: 30.21 KG/M2 | HEART RATE: 75 BPM | DIASTOLIC BLOOD PRESSURE: 72 MMHG | SYSTOLIC BLOOD PRESSURE: 110 MMHG

## 2023-04-03 DIAGNOSIS — H90.3 SENSORINEURAL HEARING LOSS (SNHL) OF BOTH EARS: ICD-10-CM

## 2023-04-03 DIAGNOSIS — H81.09 COCHLEAR HYDROPS, UNSPECIFIED LATERALITY: ICD-10-CM

## 2023-04-03 DIAGNOSIS — R42 DIZZINESS: ICD-10-CM

## 2023-04-03 DIAGNOSIS — H93.8X3 SENSATION OF FULLNESS IN BOTH EARS: Primary | ICD-10-CM

## 2023-04-03 PROCEDURE — 1090F PRES/ABSN URINE INCON ASSESS: CPT | Performed by: STUDENT IN AN ORGANIZED HEALTH CARE EDUCATION/TRAINING PROGRAM

## 2023-04-03 PROCEDURE — 1123F ACP DISCUSS/DSCN MKR DOCD: CPT | Performed by: STUDENT IN AN ORGANIZED HEALTH CARE EDUCATION/TRAINING PROGRAM

## 2023-04-03 PROCEDURE — 69210 REMOVE IMPACTED EAR WAX UNI: CPT | Performed by: STUDENT IN AN ORGANIZED HEALTH CARE EDUCATION/TRAINING PROGRAM

## 2023-04-03 PROCEDURE — G8417 CALC BMI ABV UP PARAM F/U: HCPCS | Performed by: STUDENT IN AN ORGANIZED HEALTH CARE EDUCATION/TRAINING PROGRAM

## 2023-04-03 PROCEDURE — 99214 OFFICE O/P EST MOD 30 MIN: CPT | Performed by: STUDENT IN AN ORGANIZED HEALTH CARE EDUCATION/TRAINING PROGRAM

## 2023-04-03 PROCEDURE — 1036F TOBACCO NON-USER: CPT | Performed by: STUDENT IN AN ORGANIZED HEALTH CARE EDUCATION/TRAINING PROGRAM

## 2023-04-03 PROCEDURE — G8427 DOCREV CUR MEDS BY ELIG CLIN: HCPCS | Performed by: STUDENT IN AN ORGANIZED HEALTH CARE EDUCATION/TRAINING PROGRAM

## 2023-04-03 PROCEDURE — G8399 PT W/DXA RESULTS DOCUMENT: HCPCS | Performed by: STUDENT IN AN ORGANIZED HEALTH CARE EDUCATION/TRAINING PROGRAM

## 2023-04-03 RX ORDER — FLUTICASONE PROPIONATE 50 MCG
1 SPRAY, SUSPENSION (ML) NASAL 2 TIMES DAILY
Qty: 16 G | Refills: 1 | Status: SHIPPED | OUTPATIENT
Start: 2023-04-03

## 2023-04-03 NOTE — PROGRESS NOTES
clarification is needed please contact the office at [phone]. An electronic signature was used to authenticate this note.     --Jason Victor MD

## 2023-08-25 ENCOUNTER — HOSPITAL ENCOUNTER (OUTPATIENT)
Dept: PHYSICAL THERAPY | Age: 78
Setting detail: THERAPIES SERIES
Discharge: HOME | End: 2023-08-25
Payer: MEDICARE

## 2023-08-25 PROCEDURE — 97530 THERAPEUTIC ACTIVITIES: CPT

## 2023-08-25 PROCEDURE — 97110 THERAPEUTIC EXERCISES: CPT

## 2023-08-25 PROCEDURE — 97162 PT EVAL MOD COMPLEX 30 MIN: CPT

## 2023-08-25 NOTE — PLAN OF CARE
home. Pt would benefit from skilled PT to address impairments above, provide patient education on home safety and proper use of AD and proper biomechanics, and improve quality of life. Functional Impairments:     [x]Noted lumbar/proximal hip/LE joint hypomobility   [x]Decreased LE functional ROM   [x]Decreased core/proximal hip strength and neuromuscular control   [x]Decreased LE functional strength   [x]Reduced balance/proprioceptive control   []other:      Functional Activity Limitations (from functional questionnaire and intake)   [x]Reduced ability to tolerate prolonged functional positions   [x]Reduced ability or difficulty with changes of positions or transfers between positions   [x]Reduced ability to maintain good posture and demonstrate good body mechanics with sitting, bending, and lifting   [x]Reduced ability to sleep   [] Reduced ability or tolerance with driving and/or computer work   [x]Reduced ability to perform lifting, carrying tasks   [x]Reduced ability to squat   [x]Reduced ability to forward bend   [x]Reduced ability to ambulate prolonged functional periods/distances/surfaces   [x]Reduced ability to ascend/descend stairs   []Reduced ability to run, hop, cut or jump   []other:    Participation Restrictions   []Reduced participation in self care activities   [x]Reduced participation in home management activities   [x]Reduced participation in work activities   [x]Reduced participation in social activities. [x]Reduced participation in sport/recreation activities. Classification :    [x]Signs/symptoms consistent with post-surgical status including decreased ROM, strength and function.    []Signs/symptoms consistent with joint sprain/strain   []Signs/symptoms consistent with patella-femoral syndrome   []Signs/symptoms consistent with knee OA/hip OA   []Signs/symptoms consistent with internal derangement of knee/Hip   []Signs/symptoms consistent with functional hip weakness/NMR control

## 2023-08-25 NOTE — FLOWSHEET NOTE
Adjusted    Overall Progression Towards Functional goals/ Treatment Progress Update:  [] Patient is progressing as expected towards functional goals listed. [] Progression is slowed due to complexities/Impairments listed. [] Progression has been slowed due to co-morbidities. [x] Plan just implemented, too soon to assess goals progression <30days   [] Goals require adjustment due to lack of progress  [] Patient is not progressing as expected and requires additional follow up with physician  [] Other    Persisting Functional Limitations/Impairments:  []Sitting [x]Standing   [x]Walking [x]Stairs   [x]Transfers [x]ADLs   [x]Squatting/bending [x]Kneeling  [x]Housework [x]Job related tasks  []Driving [x]Sports/Recreation   [x]Sleeping []Other:    ASSESSMENT:  See eval  Treatment/Activity Tolerance:  [x] Pt able to complete treatment [] Patient limited by fatique  [] Patient limited by pain  [] Patient limited by other medical complications  [] Other:     Prognosis:  [] Good [x] Fair  [] Poor    Patient Requires Follow-up: [x] Yes  [] No    Plan for next treatment session: cont with L knee flexion ROM, B hip and gluteal and core strengthening     PLAN: See eval. PT 2x / week for 6 weeks. [] Continue per plan of care [] Alter current plan (see comments)  [x] Plan of care initiated [] Hold pending MD visit [] Discharge    Electronically signed by: Gaudencio Mazariegos, PT, DPT      Note: If patient does not return for scheduled/ recommended follow up visits, this note will serve as a discharge from care along with most recent update on progress.

## 2023-08-30 ENCOUNTER — HOSPITAL ENCOUNTER (OUTPATIENT)
Dept: PHYSICAL THERAPY | Age: 78
Setting detail: THERAPIES SERIES
Discharge: HOME | End: 2023-08-30
Payer: MEDICARE

## 2023-08-30 PROCEDURE — 97110 THERAPEUTIC EXERCISES: CPT

## 2023-08-30 NOTE — FLOWSHEET NOTE
patients Functional Deficits. [] Progressing: [] Met: [] Not Met: [] Adjusted  3. Patient will demonstrate an increase in Strength to at least 4/5 as well as good proximal hip strength and control to allow for proper functional mobility as indicated by patients Functional Deficits. [] Progressing: [] Met: [] Not Met: [] Adjusted  4. Patient will return to functional activities including ambulation with LRAD or no AD for up to 30 minutes without increased symptoms or restriction. [] Progressing: [] Met: [] Not Met: [] Adjusted    Overall Progression Towards Functional goals/ Treatment Progress Update:  [] Patient is progressing as expected towards functional goals listed. [] Progression is slowed due to complexities/Impairments listed. [] Progression has been slowed due to co-morbidities. [x] Plan just implemented, too soon to assess goals progression <30days   [] Goals require adjustment due to lack of progress  [] Patient is not progressing as expected and requires additional follow up with physician  [] Other    Persisting Functional Limitations/Impairments:  []Sitting [x]Standing   [x]Walking [x]Stairs   [x]Transfers [x]ADLs   [x]Squatting/bending [x]Kneeling  [x]Housework [x]Job related tasks  []Driving [x]Sports/Recreation   [x]Sleeping []Other:    ASSESSMENT:  Tolerated exercises well. Reported relief of back discomfort after stretching. No increase in knee discomfort with exercises. Provided pt with updated HEP with handout provided. Cont diff with L knee flexion and B gluteal and hip strength. Will continue to progress per to tolerance to improve functional mobility, strength and flexibility.      Treatment/Activity Tolerance:  [x] Pt able to complete treatment [] Patient limited by fatique  [] Patient limited by pain  [] Patient limited by other medical complications  [] Other:     Prognosis:  [] Good [x] Fair  [] Poor    Patient Requires Follow-up: [x] Yes  [] No    Plan for next treatment

## 2023-09-01 ENCOUNTER — HOSPITAL ENCOUNTER (OUTPATIENT)
Dept: PHYSICAL THERAPY | Age: 78
Setting detail: THERAPIES SERIES
Discharge: HOME | End: 2023-09-01
Payer: MEDICARE

## 2023-09-01 PROCEDURE — 97112 NEUROMUSCULAR REEDUCATION: CPT

## 2023-09-01 PROCEDURE — 97110 THERAPEUTIC EXERCISES: CPT

## 2023-09-01 NOTE — FLOWSHEET NOTE
ES (un) (30708):   [] Home Management Training x  [] ES(attended) (55443)   [] Dry Needling 1-2 muscles (19770):  [] Dry Needling 3+ muscles (842598  [] Group:      [] Other:     GOALS:   Patient stated goal: improve walking, and straighten her knee  [] Progressing: [] Met: [] Not Met: [] Adjusted     Therapist goals for Patient:   Short Term Goals: To be achieved in: 2 weeks  1. Independent in HEP and progression per patient tolerance, in order to prevent re-injury. [] Progressing: [] Met: [] Not Met: [] Adjusted  2. Patient will have a decrease in pain to facilitate improvement in movement, function, and ADLs as indicated by Functional Deficits. [] Progressing: [] Met: [] Not Met: [] Adjusted     Long Term Goals: To be achieved in: 6 weeks  1. Pt will improve LEFS by 9 points to reduce disability and progress towards PLOF. [] Progressing: [] Met: [] Not Met: [] Adjusted  2. Patient will demonstrate increased AROM to 120 degrees L knee flexion, 0 degrees L knee extension to allow for proper joint functioning as indicated by patients Functional Deficits. [] Progressing: [] Met: [] Not Met: [] Adjusted  3. Patient will demonstrate an increase in Strength to at least 4/5 as well as good proximal hip strength and control to allow for proper functional mobility as indicated by patients Functional Deficits. [] Progressing: [] Met: [] Not Met: [] Adjusted  4. Patient will return to functional activities including ambulation with LRAD or no AD for up to 30 minutes without increased symptoms or restriction. [] Progressing: [] Met: [] Not Met: [] Adjusted    Overall Progression Towards Functional goals/ Treatment Progress Update:  [] Patient is progressing as expected towards functional goals listed. [] Progression is slowed due to complexities/Impairments listed. [] Progression has been slowed due to co-morbidities.   [x] Plan just implemented, too soon to assess goals progression <30days   [] Goals require

## 2023-09-06 ENCOUNTER — HOSPITAL ENCOUNTER (OUTPATIENT)
Dept: PHYSICAL THERAPY | Age: 78
Setting detail: THERAPIES SERIES
Discharge: HOME | End: 2023-09-06
Payer: MEDICARE

## 2023-09-06 PROCEDURE — 97110 THERAPEUTIC EXERCISES: CPT

## 2023-09-06 PROCEDURE — 97530 THERAPEUTIC ACTIVITIES: CPT

## 2023-09-06 PROCEDURE — 97140 MANUAL THERAPY 1/> REGIONS: CPT

## 2023-09-06 NOTE — FLOWSHEET NOTE
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, subsequent encounter [M97.12XD]  Treatment Diagnosis: impaired gait, dec BLE ROM and strength, impaired functional mobility, impaired balance        Insurance/Certification information:  PT Insurance Information: [employer] ($40 copay, Nehal Palafox)  Physician Information:  Mary Jane Saleh DO    Plan of care signed (Y/N): []  Yes [x]  No     Date of Patient follow up with Physician:      Progress Report: []  Yes  [x]  No     Date Range for reporting period:  Beginnin2023  Ending:     Progress report due (10 Rx/or 30 days whichever is less): visit #6 or 66    Recertification due (POC duration/ or 90 days whichever is less): visit #12 or 23    Visit # Insurance Allowable Auth required? Date Range    [employer] ($40 copay, auth req)  12V approved  [x]  Yes  []  No  - 10/20/23       Latex Allergy:  [x]NO      []YES  Preferred Language for Healthcare:   [x]English       []other:    Functional Scale:                                                      Date assessed:  LEFS: raw score = 30; dysfunction = 62.5%                      23    Pain level:  0/10 L knee, - 5-6/10 L side of low back     SUBJECTIVE: didn't do some exercises because she had a sharp pain in her knee. Continues to have back pain. Was able to do her exercises this morning. OBJECTIVE: See eval  left knee -  post ORIF retrograde femoral nail 22 Provided her with brace today in the office on 3/8/23. Xray on 23: X-rays 2 views reveals interval healing periprosthetic distal femur fracture, intact internal fixation with no loosening or change in hardware.      RESTRICTIONS/PRECAUTIONS: afib, HTN

## 2023-09-08 ENCOUNTER — HOSPITAL ENCOUNTER (OUTPATIENT)
Dept: PHYSICAL THERAPY | Age: 78
Setting detail: THERAPIES SERIES
Discharge: HOME | End: 2023-09-08
Payer: MEDICARE

## 2023-09-08 PROCEDURE — 97530 THERAPEUTIC ACTIVITIES: CPT

## 2023-09-08 PROCEDURE — 97110 THERAPEUTIC EXERCISES: CPT

## 2023-09-08 PROCEDURE — 97140 MANUAL THERAPY 1/> REGIONS: CPT

## 2023-09-08 NOTE — FLOWSHEET NOTE
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, subsequent encounter [M97.12XD]  Treatment Diagnosis: impaired gait, dec BLE ROM and strength, impaired functional mobility, impaired balance        Insurance/Certification information:  PT Insurance Information: [employer] ($40 copay, Roxane Damian)  Physician Information:  Anjana Lozano DO    Plan of care signed (Y/N): []  Yes [x]  No     Date of Patient follow up with Physician:      Progress Report: []  Yes  [x]  No     Date Range for reporting period:  Beginnin2023  Ending:     Progress report due (10 Rx/or 30 days whichever is less): visit #6 or     Recertification due (POC duration/ or 90 days whichever is less): visit #12 or 23    Visit # Insurance Allowable Auth required? Date Range    [employer] ($40 copay, auth req)  12V approved  [x]  Yes  []  No  - 10/20/23       Latex Allergy:  [x]NO      []YES  Preferred Language for Healthcare:   [x]English       []other:    Functional Scale:                                                      Date assessed:  LEFS: raw score = 30; dysfunction = 62.5%                      23    Pain level:  0/10 L knee, - 5-6/10 L side of low back     SUBJECTIVE: states she has pain in the morning but it gets better as she moves around more. Next appt with her PCP is in November. Continues to have pain in her back- has tried Voltaren gel and heat with some relief. OBJECTIVE: See eval  left knee -  post ORIF retrograde femoral nail 22 Provided her with brace today in the office on 3/8/23.   Xray on 23: X-rays 2 views reveals interval healing periprosthetic distal femur fracture, intact internal fixation with no loosening

## 2023-09-11 ENCOUNTER — HOSPITAL ENCOUNTER (OUTPATIENT)
Dept: PHYSICAL THERAPY | Age: 78
Setting detail: THERAPIES SERIES
Discharge: HOME | End: 2023-09-11
Payer: MEDICARE

## 2023-09-11 PROCEDURE — 97110 THERAPEUTIC EXERCISES: CPT

## 2023-09-11 PROCEDURE — 97530 THERAPEUTIC ACTIVITIES: CPT

## 2023-09-11 NOTE — PLAN OF CARE
Limitations/Impairments:  []Sitting [x]Standing   [x]Walking [x]Stairs   [x]Transfers [x]ADLs   [x]Squatting/bending [x]Kneeling  [x]Housework [x]Job related tasks  []Driving [x]Sports/Recreation   [x]Sleeping []Other:    ASSESSMENT:  PN completed this date as Ninfa Leader has attended 6 episodes of therapy. She has demonstrated some improvements in her extension ROM but continues to demonstrate difficulty and pain with L knee flexion and bilateral hamstring weakness and hip abductor and extensor weakness. Updated Hep and provided with HO. She also continues to be limited in her sessions d/t back pain. She would continue to benefit from skilled therapy to improve her L knee flexion ROM in WB, improve her functional mobility and gait. Treatment/Activity Tolerance:  [x] Pt able to complete treatment [] Patient limited by fatique  [] Patient limited by pain  [] Patient limited by other medical complications  [] Other:     Prognosis:  [] Good [x] Fair  [] Poor    Patient Requires Follow-up: [x] Yes  [] No    Plan for next treatment session: cont with L knee flexion ROM, B hip and gluteal and core strengthening     PLAN: See eval. PT 2x / week for 6 weeks. [] Continue per plan of care [] Alter current plan (see comments)  [x] Plan of care initiated [] Hold pending MD visit [] Discharge    Electronically signed by: Azar Lozano PT, DPT      Note: If patient does not return for scheduled/ recommended follow up visits, this note will serve as a discharge from care along with most recent update on progress.

## 2023-09-15 ENCOUNTER — HOSPITAL ENCOUNTER (OUTPATIENT)
Dept: PHYSICAL THERAPY | Age: 78
Setting detail: THERAPIES SERIES
Discharge: HOME | End: 2023-09-15
Payer: MEDICARE

## 2023-09-15 PROCEDURE — 97112 NEUROMUSCULAR REEDUCATION: CPT

## 2023-09-15 PROCEDURE — 97110 THERAPEUTIC EXERCISES: CPT

## 2023-09-15 PROCEDURE — 97530 THERAPEUTIC ACTIVITIES: CPT

## 2023-09-15 NOTE — FLOWSHEET NOTE
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, subsequent encounter [M97.12XD]  Treatment Diagnosis: impaired gait, dec BLE ROM and strength, impaired functional mobility, impaired balance        Insurance/Certification information:  PT Insurance Information: [employer] ($40 copay, Sobia Waldrop)  Physician Information:  Kinza Stanley DO    Plan of care signed (Y/N): []  Yes [x]  No     Date of Patient follow up with Physician:      Progress Report: [x]  Yes  []  No     Date Range for reporting period:  Beginnin2023  Ending:     Progress report due (10 Rx/or 30 days whichever is less): visit #6 or     Recertification due (POC duration/ or 90 days whichever is less): visit #12 or 23    Visit # Insurance Allowable Auth required? Date Range    [employer] ($40 copay, auth req)  12V approved  [x]  Yes  []  No  - 10/20/23       Latex Allergy:  [x]NO      []YES  Preferred Language for Healthcare:   [x]English       []other:    Functional Scale:                                                      Date assessed:  LEFS: raw score = 30; dysfunction = 62.5%                      23  LEFS: raw score =41; dysfunction = 51/25%  23    Pain level:  0/10 L knee, - 5-6/10 L side of low back     SUBJECTIVE: States doing same as always. Doing well. States she is not having as much discomfort today as she had the last couple mornings. Feels like PT has been going well. Had some relief of back pain after last session but had soreness in her back and in her knee. States she wasn't as bad when she got out of bed. On Saturday took her pain medications an some tylenol but states it wasn't as helpful.  States her doctor won't do much

## 2023-09-18 ENCOUNTER — HOSPITAL ENCOUNTER (OUTPATIENT)
Dept: PHYSICAL THERAPY | Age: 78
Setting detail: THERAPIES SERIES
Discharge: HOME | End: 2023-09-18
Payer: MEDICARE

## 2023-09-18 PROCEDURE — 97116 GAIT TRAINING THERAPY: CPT

## 2023-09-18 PROCEDURE — 97110 THERAPEUTIC EXERCISES: CPT

## 2023-09-18 PROCEDURE — 97530 THERAPEUTIC ACTIVITIES: CPT

## 2023-09-18 NOTE — FLOWSHEET NOTE
Limitations/Impairments:  []Sitting [x]Standing   [x]Walking [x]Stairs   [x]Transfers [x]ADLs   [x]Squatting/bending [x]Kneeling  [x]Housework [x]Job related tasks  []Driving [x]Sports/Recreation   [x]Sleeping []Other:    ASSESSMENT:  Pt tolerated exercises well this date. Provided education about decreasing fear of falling and improving BLE use with transfers without BLE use on chair to improve BLE strength and pt verbalized understanding. Also instructed pt to perform quadricept stretch with strap in side lying to improve adherence to stretching at home to decrease discomfort and decrease muscle adhesions. Continued to require max verbal cueing to improve weight shift into LLE, maintain WBOS to improve ambulation without increase in pain reported. Only had some increase in pain with alteral side stepping over hurdles this date that resolved with rest. Will continue to benefit from skilled therapy to improve quality of life, decrease risk of falls, and improve balance and functional mobility. \  Treatment/Activity Tolerance:  [x] Pt able to complete treatment [] Patient limited by fatique  [] Patient limited by pain  [] Patient limited by other medical complications  [] Other:     Prognosis:  [] Good [x] Fair  [] Poor    Patient Requires Follow-up: [x] Yes  [] No    Plan for next treatment session: cont with L knee flexion ROM, B hip and gluteal and core strengthening     PLAN: See valarie. PT 2x / week for 6 weeks. [] Continue per plan of care [] Alter current plan (see comments)  [x] Plan of care initiated [] Hold pending MD visit [] Discharge    Electronically signed by: Latoya Jackson PT, DPT      Note: If patient does not return for scheduled/ recommended follow up visits, this note will serve as a discharge from care along with most recent update on progress.

## 2023-09-22 ENCOUNTER — APPOINTMENT (OUTPATIENT)
Dept: PHYSICAL THERAPY | Age: 78
End: 2023-09-22
Payer: MEDICARE

## 2023-09-25 ENCOUNTER — HOSPITAL ENCOUNTER (OUTPATIENT)
Dept: PHYSICAL THERAPY | Age: 78
Setting detail: THERAPIES SERIES
Discharge: HOME | End: 2023-09-25
Payer: MEDICARE

## 2023-09-25 PROCEDURE — 97110 THERAPEUTIC EXERCISES: CPT

## 2023-09-25 PROCEDURE — 97140 MANUAL THERAPY 1/> REGIONS: CPT

## 2023-09-25 NOTE — FLOWSHEET NOTE
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, subsequent encounter [M97.12XD]  Treatment Diagnosis: impaired gait, dec BLE ROM and strength, impaired functional mobility, impaired balance        Insurance/Certification information:  PT Insurance Information: [employer] ($40 copay, Tessie Ibrahim)  Physician Information:  Mustapha Christie DO    Plan of care signed (Y/N): []  Yes [x]  No     Date of Patient follow up with Physician:      Progress Report: [x]  Yes  []  No     Date Range for reporting period:  Beginnin2023  Ending:     Progress report due (10 Rx/or 30 days whichever is less): visit #6 or 3/78/38    Recertification due (POC duration/ or 90 days whichever is less): visit #12 or 23    Visit # Insurance Allowable Auth required? Date Range    [employer] ($40 copay, auth req)  12V approved  [x]  Yes  []  No  - 10/20/23       Latex Allergy:  [x]NO      []YES  Preferred Language for Healthcare:   [x]English       []other:    Functional Scale:                                                      Date assessed:  LEFS: raw score = 30; dysfunction = 62.5%                      23  LEFS: raw score =41; dysfunction = 51/25%  23    Pain level:  0/10 L knee, - 5-6/10 L side of low back     SUBJECTIVE: feels like she was having pain on medial and lateral side of knee on LLE. States she had to use her cane more than usual. Feels better after exercises. Has been taking her pain pills more for her back than her knee. States she she scratched her ankle on a fran bush when she was working on the yard. Took the bars out of her sofa this weekend but is able to get up without it.     OBJECTIVE:   :     PROM AROM     L R L R

## 2023-09-29 ENCOUNTER — APPOINTMENT (OUTPATIENT)
Dept: PHYSICAL THERAPY | Age: 78
End: 2023-09-29
Payer: MEDICARE

## 2023-10-02 ENCOUNTER — HOSPITAL ENCOUNTER (OUTPATIENT)
Dept: PHYSICAL THERAPY | Age: 78
Setting detail: THERAPIES SERIES
Discharge: HOME | End: 2023-10-02
Payer: MEDICARE

## 2023-10-02 PROCEDURE — 97530 THERAPEUTIC ACTIVITIES: CPT

## 2023-10-02 PROCEDURE — 97110 THERAPEUTIC EXERCISES: CPT

## 2023-10-02 NOTE — FLOWSHEET NOTE
ambulation/stair navigation      Manual Treatments:  PROM / STM / Oscillations-Mobs:  G-I, II, III, IV (PA's, Inf., Post.)  [] (02087) Provided manual therapy to mobilize LE, proximal hip and/or LS spine soft tissue/joints for the purpose of modulating pain, promoting relaxation,  increasing ROM, reducing/eliminating soft tissue swelling/inflammation/restriction, improving soft tissue extensibility and allowing for proper ROM for normal function with self care, mobility, lifting and ambulation. Modalities:  [] (50775) Vasopneumatic compression: Utilized vasopneumatic compression to decrease edema / swelling for the purpose of improving mobility and quad tone / recruitment which will allow for increased overall function including but not limited to self-care, transfers, ambulation, and ascending / descending stairs. Charges:  Timed Code Treatment Minutes: 45   Total Treatment Minutes: 45     [] EVAL - LOW (67676)   [] EVAL - MOD (82479)  [] EVAL - HIGH (79485)  [] RE-EVAL (39829)  [x] JX(24930) x  2    [] Ionto  [] NMR (84051) x      [] Vaso  [] Manual (39382) x     [] Ultrasound  [x] TA x 1     [] Mech Traction (65664)  [] Aquatic Therapy x     [] ES (un) (18478):   [] Home Management Training x  [] ES(attended) (93238)   [] Dry Needling 1-2 muscles (42718):  [] Dry Needling 3+ muscles (608865  [] Group:      [] Other: gait x    GOALS:   Patient stated goal: improve walking, and straighten her knee  [x] Progressing: [] Met: [] Not Met: [] Adjusted     Therapist goals for Patient:   Short Term Goals: To be achieved in: 2 weeks  1. Independent in HEP and progression per patient tolerance, in order to prevent re-injury. [] Progressing: [x] Met: [] Not Met: [] Adjusted  2. Patient will have a decrease in pain to facilitate improvement in movement, function, and ADLs as indicated by Functional Deficits. [] Progressing: [x] Met: [] Not Met: [] Adjusted     Long Term Goals: To be achieved in: 6 weeks  1.  Pt

## 2023-10-09 ENCOUNTER — HOSPITAL ENCOUNTER (OUTPATIENT)
Dept: PHYSICAL THERAPY | Age: 78
Setting detail: THERAPIES SERIES
Discharge: HOME | End: 2023-10-09
Payer: MEDICARE

## 2023-10-09 PROCEDURE — 97112 NEUROMUSCULAR REEDUCATION: CPT

## 2023-10-09 PROCEDURE — 97110 THERAPEUTIC EXERCISES: CPT

## 2023-10-09 NOTE — FLOWSHEET NOTE
Functional Deficits. [] Progressing: [x] Met: [] Not Met: [] Adjusted     Long Term Goals: To be achieved in: 6 weeks  1. Pt will improve LEFS by 9 points to reduce disability and progress towards PLOF. [] Progressing: [] Met: [] Not Met: [] Adjusted  2. Patient will demonstrate increased AROM to 110-115 degrees L knee flexion, 0 degrees L knee extension to allow for proper joint functioning as indicated by patients Functional Deficits. [x] Progressing: [] Met: [] Not Met: [x] Adjusted- 9/11  3. Patient will demonstrate an increase in Strength to at least 4/5 as well as good proximal hip strength and control to allow for proper functional mobility as indicated by patients Functional Deficits. [] Progressing: [] Met: [] Not Met: [] Adjusted  4. Patient will return to functional activities including ambulation with LRAD or no AD for up to 30 minutes without increased symptoms or restriction. [x] Progressing: [] Met: [] Not Met: [] Adjusted    Overall Progression Towards Functional goals/ Treatment Progress Update:  [] Patient is progressing as expected towards functional goals listed. [] Progression is slowed due to complexities/Impairments listed. [] Progression has been slowed due to co-morbidities. [x] Plan just implemented, too soon to assess goals progression <30days   [] Goals require adjustment due to lack of progress  [] Patient is not progressing as expected and requires additional follow up with physician  [] Other    Persisting Functional Limitations/Impairments:  []Sitting [x]Standing   [x]Walking [x]Stairs   [x]Transfers [x]ADLs   [x]Squatting/bending [x]Kneeling  [x]Housework [x]Job related tasks  []Driving [x]Sports/Recreation   [x]Sleeping []Other:    ASSESSMENT:  Continued to tolerate exercises well. Continued decrease strength on LLE HS. Had increase in crepitus on RLE with HS curl, LE ext and mini squat but no increase in discomfort.  Slight increase increase in discomfort in low back

## 2023-10-16 ENCOUNTER — HOSPITAL ENCOUNTER (OUTPATIENT)
Dept: PHYSICAL THERAPY | Age: 78
Setting detail: THERAPIES SERIES
Discharge: HOME | End: 2023-10-16
Payer: MEDICARE

## 2023-10-16 PROCEDURE — 97530 THERAPEUTIC ACTIVITIES: CPT

## 2023-10-16 PROCEDURE — 97110 THERAPEUTIC EXERCISES: CPT

## 2023-10-16 NOTE — PLAN OF CARE
coordination, kinesthetic sense, posture, motor skill, proprioception of core, proximal hip and LE for self care, mobility, lifting, and ambulation/stair navigation      Manual Treatments:  PROM / STM / Oscillations-Mobs:  G-I, II, III, IV (PA's, Inf., Post.)  [] (25033) Provided manual therapy to mobilize LE, proximal hip and/or LS spine soft tissue/joints for the purpose of modulating pain, promoting relaxation,  increasing ROM, reducing/eliminating soft tissue swelling/inflammation/restriction, improving soft tissue extensibility and allowing for proper ROM for normal function with self care, mobility, lifting and ambulation. Modalities:  [] (84536) Vasopneumatic compression: Utilized vasopneumatic compression to decrease edema / swelling for the purpose of improving mobility and quad tone / recruitment which will allow for increased overall function including but not limited to self-care, transfers, ambulation, and ascending / descending stairs. Charges:  Timed Code Treatment Minutes: 45   Total Treatment Minutes: 45     [] EVAL - LOW (96590)   [] EVAL - MOD (46152)  [] EVAL - HIGH (87283)  [] RE-EVAL (08824)  [x] FE(63281) x  1    [] Ionto  [] NMR (77460) x      [] Vaso  [] Manual (63777) x     [] Ultrasound  [x] TA x  2    [] Mech Traction (11089)  [] Aquatic Therapy x     [] ES (un) (17835):   [] Home Management Training x  [] ES(attended) (47744)   [] Dry Needling 1-2 muscles (37613):  [] Dry Needling 3+ muscles (358009  [] Group:      [] Other: gait x    GOALS:   Patient stated goal: improve walking, and straighten her knee  [x] Progressing: [] Met: [] Not Met: [] Adjusted     Therapist goals for Patient:   Short Term Goals: To be achieved in: 2 weeks  1. Independent in HEP and progression per patient tolerance, in order to prevent re-injury. [] Progressing: [x] Met: [] Not Met: [] Adjusted  2.  Patient will have a decrease in pain to facilitate improvement in movement, function, and ADLs as

## 2023-10-18 ENCOUNTER — OFFICE VISIT (OUTPATIENT)
Dept: ENT CLINIC | Age: 78
End: 2023-10-18
Payer: MEDICARE

## 2023-10-18 VITALS
WEIGHT: 160 LBS | TEMPERATURE: 97.6 F | OXYGEN SATURATION: 97 % | SYSTOLIC BLOOD PRESSURE: 119 MMHG | HEIGHT: 61 IN | HEART RATE: 90 BPM | DIASTOLIC BLOOD PRESSURE: 67 MMHG | BODY MASS INDEX: 30.21 KG/M2

## 2023-10-18 DIAGNOSIS — L29.9 ITCHING OF EAR: ICD-10-CM

## 2023-10-18 DIAGNOSIS — H90.0 CONDUCTIVE HEARING LOSS OF BOTH EARS: ICD-10-CM

## 2023-10-18 DIAGNOSIS — H61.23 IMPACTED CERUMEN OF BOTH EARS: Primary | ICD-10-CM

## 2023-10-18 PROCEDURE — 69210 REMOVE IMPACTED EAR WAX UNI: CPT | Performed by: OTOLARYNGOLOGY

## 2023-10-18 RX ORDER — FAMOTIDINE 40 MG/1
TABLET, FILM COATED ORAL
COMMUNITY
Start: 2023-10-03

## 2023-10-18 RX ORDER — HYDROCODONE BITARTRATE AND ACETAMINOPHEN 5; 325 MG/1; MG/1
TABLET ORAL
COMMUNITY
Start: 2023-01-17

## 2023-10-18 RX ORDER — PREDNISOLONE ACETATE 10 MG/ML
SUSPENSION/ DROPS OPHTHALMIC
Qty: 15 ML | Refills: 1 | Status: SHIPPED | OUTPATIENT
Start: 2023-10-18

## 2023-10-18 RX ORDER — APIXABAN 5 MG/1
TABLET, FILM COATED ORAL
COMMUNITY
Start: 2023-08-24

## 2023-10-18 NOTE — PROGRESS NOTES
Chief Complaint   Patient presents with    Cerumen Impaction     Bilateral      Ear Problem     Itching      Hearing Loss     bilateral       HISTORY:    Lucien Hernandez stated that the hearing is decreased in both ears, which are plugged up with ear wax. EXAMINATION:    Both external ear canals were occluded by cerumen impaction, obscuring visualization of the TMs. PROCEDURE - REMOVAL OF BILATERAL CERUMEN IMPACTION:   The cerumen impaction was removed from both of the EACs, under otomicroscopy visualization, with instrumentation, using a Billeau wire loop and alligator forceps. After successful cerumen removal, the EACs appeared to be normal and clear bilaterally without mass, exudate, or edema. The tympanic membranes appeared to be normal, including normal pneumatic mobility bilaterally. There was no evidence of acute disease. Lucien Hernandez reported improved hearing, back to usual level, after cerumen removal.          IMPRESSION / Marsa Certain / Mary Mayen / PROCEDURES:       Lucien Hernandez was seen today for cerumen impaction, ear problem and hearing loss. Diagnoses and all orders for this visit:    Impacted cerumen of both ears    Conductive hearing loss of both ears    Itching of ear  -     prednisoLONE acetate (PRED FORTE) 1 % ophthalmic suspension; Use 4 drops in the affected ear(s) TID prn itching or dermatitis        RECOMMENDATIONS / PLAN:   See Patient Instructions on file for this visit. Return for recurrence of symptoms of excessive ear wax, or any other ear, nose, throat, or sinus problems.

## 2023-10-18 NOTE — PATIENT INSTRUCTIONS
Schedule an audiogram (hearing test), if your hearing is not back to your usual ability, or if hearing loss or tinnitus (ringing or other noise) persists, despite removal of ear wax,. Schedule an appointment for ear recheck and possible cleaning in the future if your hearing is decreased, or you have a sensation of ear wax build up or are told you have ear wax build up by your primary physician or other health care provider. You may use an over the counter ear wax removal kit (such as Murine, Bausch and Lomb, NeilMed, or Debrox wax removal system) for ear wax removal, as needed. It may help to use Debrox (OTC) for 4 days prior to future visits for ear cleaning. This may soften your ear wax and facilitate removal of the wax. NO Q-TIPS OR OTHER INSTRUMENTS/OBJECTS IN THE EARS   You should never clean your ears with a Q-tip, cotton tipped applicator, Concepcion pin, paper clip, safety pin, pen cap, or any other instrument. This will tend to push wax in deeper and pack the ear canal with wax. There is a high risk and danger of this practice, especially rupture of ear drum, dislocation or other damage to ossicles, and permanent, irreversible, and irreparable hearing loss. It may cause inflammation and irritation of the ear canal and cause itching or pain. I recommend only use of one the several ear wax removal kits available \"over the counter\" if you feel a need to try to remove ear wax. For example, Murine, Bausch and Lomb, NeilMed, or Debrox ear wax removal kits may be used for ear wax removal, as needed. No other methods should be self used for cleaning your ears.

## 2023-10-25 ENCOUNTER — APPOINTMENT (OUTPATIENT)
Dept: PHYSICAL THERAPY | Age: 78
End: 2023-10-25
Payer: MEDICARE

## 2023-10-27 ENCOUNTER — HOSPITAL ENCOUNTER (OUTPATIENT)
Dept: PHYSICAL THERAPY | Age: 78
Setting detail: THERAPIES SERIES
Discharge: HOME | End: 2023-10-27
Payer: MEDICARE

## 2023-10-27 PROCEDURE — 97110 THERAPEUTIC EXERCISES: CPT

## 2023-10-27 NOTE — FLOWSHEET NOTE
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, subsequent encounter [M97.12XD]  Treatment Diagnosis: impaired gait, dec BLE ROM and strength, impaired functional mobility, impaired balance        Insurance/Certification information:  PT Insurance Information: [employer] ($40 copay, Kwabena Favors)  Physician Information:  Olga Roman DO    Plan of care signed (Y/N): []  Yes [x]  No     Date of Patient follow up with Physician:      Progress Report: []  Yes  [x]  No     Date Range for reporting period:  Beginnin2023  PN:  23  POC: 10/16/23  Ending:     Progress report due (10 Rx/or 30 days whichever is less): visit #6 or     Recertification due (POC duration/ or 90 days whichever is less): visit #12 or 23    Visit # Insurance Allowable Auth required? Date Range     + 1/10 [employer] ($40 copay, auth req)  12V approved  [x]  Yes  []  No -10 add v  approved until 23       Latex Allergy:  [x]NO      []YES  Preferred Language for Healthcare:   [x]English       []other:    Functional Scale:                                                      Date assessed:  LEFS: raw score = 30; dysfunction = 62.5%                      23  LEFS: raw score =41; dysfunction = 51.25%  23  LEFS: raw score = 43; dysfunction = 46.25%  10/16/23  Pain level:  0/10 L knee, - 5-6/10 L side of low back     SUBJECTIVE:  Reports pain in both knees today. Took pain medications prior to session. States her pain in both of her knees. States she had a wellness check for blood and EKG and will see Dr. Torrez.  States her pain in her knees bother her when she first gets up and sometimes has to use her walker but after a couple minutes it eases

## 2023-10-30 ENCOUNTER — HOSPITAL ENCOUNTER (OUTPATIENT)
Dept: WOMENS IMAGING | Age: 78
Discharge: HOME | End: 2023-10-30
Payer: MEDICARE

## 2023-10-30 DIAGNOSIS — Z13.9 ENCOUNTER FOR SCREENING: ICD-10-CM

## 2023-10-30 PROCEDURE — 77067 SCR MAMMO BI INCL CAD: CPT

## 2023-11-01 ENCOUNTER — HOSPITAL ENCOUNTER (OUTPATIENT)
Dept: PHYSICAL THERAPY | Age: 78
Setting detail: THERAPIES SERIES
Discharge: HOME | End: 2023-11-01
Payer: MEDICARE

## 2023-11-01 PROCEDURE — 97530 THERAPEUTIC ACTIVITIES: CPT

## 2023-11-01 PROCEDURE — 97110 THERAPEUTIC EXERCISES: CPT

## 2023-11-01 NOTE — FLOWSHEET NOTE
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, subsequent encounter [M97.12XD]  Treatment Diagnosis: impaired gait, dec BLE ROM and strength, impaired functional mobility, impaired balance        Insurance/Certification information:  PT Insurance Information: [employer] ($40 copay, Uriah Lively)  Physician Information:  Skippy Bumps, DO    Plan of care signed (Y/N): []  Yes [x]  No     Date of Patient follow up with Physician:      Progress Report: []  Yes  [x]  No     Date Range for reporting period:  Beginnin2023  PN:  23  POC: 10/16/23  Ending:     Progress report due (10 Rx/or 30 days whichever is less): visit #6 or     Recertification due (POC duration/ or 90 days whichever is less): visit #12 or 23    Visit # Insurance Allowable Auth required? Date Range     + 2/10 [employer] ($40 copay, auth req)  12V approved  [x]  Yes  []  No -10 add v  approved until 23       Latex Allergy:  [x]NO      []YES  Preferred Language for Healthcare:   [x]English       []other:    Functional Scale:                                                      Date assessed:  LEFS: raw score = 30; dysfunction = 62.5%                      23  LEFS: raw score =41; dysfunction = 51.25%  23  LEFS: raw score = 43; dysfunction = 46.25%  10/16/23  Pain level:  0/10 L knee, - 5-6/10 L side of low back     SUBJECTIVE: States that when she sits for 10-15 minutes she has pain in her knee when she first gets up after sitting for that period of time. And causes her to sometimes have to keep using her walker. Once she gets moving it eases up/ See's Dr. Gordo Riley on  or  for a follow up.  States sometimes its a sharp pain on

## 2023-11-06 ENCOUNTER — HOSPITAL ENCOUNTER (OUTPATIENT)
Dept: PHYSICAL THERAPY | Age: 78
Setting detail: THERAPIES SERIES
Discharge: HOME | End: 2023-11-06
Payer: MEDICARE

## 2023-11-06 PROCEDURE — 97530 THERAPEUTIC ACTIVITIES: CPT

## 2023-11-06 PROCEDURE — 97112 NEUROMUSCULAR REEDUCATION: CPT

## 2023-11-06 PROCEDURE — 97110 THERAPEUTIC EXERCISES: CPT

## 2023-11-06 NOTE — FLOWSHEET NOTE
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, subsequent encounter [M97.12XD]  Treatment Diagnosis: impaired gait, dec BLE ROM and strength, impaired functional mobility, impaired balance        Insurance/Certification information:  PT Insurance Information: [employer] ($40 copay, Lajuanda Counter)  Physician Information:  Tommy Mccurdy DO    Plan of care signed (Y/N): []  Yes [x]  No     Date of Patient follow up with Physician:      Progress Report: []  Yes  [x]  No     Date Range for reporting period:  Beginnin2023  PN:  23  POC: 10/16/23  Ending:     Progress report due (10 Rx/or 30 days whichever is less): visit #6 or     Recertification due (POC duration/ or 90 days whichever is less): visit #12 or 23    Visit # Insurance Allowable Auth required? Date Range     + 3/10 [employer] ($40 copay, auth req)  12V approved  [x]  Yes  []  No -10 add v  approved until 23       Latex Allergy:  [x]NO      []YES  Preferred Language for Healthcare:   [x]English       []other:    Functional Scale:                                                      Date assessed:  LEFS: raw score = 30; dysfunction = 62.5%                      23  LEFS: raw score =41; dysfunction = 51.25%  23  LEFS: raw score = 43; dysfunction = 46.25%  10/16/23  Pain level:  0/10 L knee, - 5-6/10 L side of low back     SUBJECTIVE:  states Friday and Saturday was her best days yet. Was able to work on the yard on Saturday but then stated she paid for it on . Is feeling a little better today with her back and knee pain. Has her follow up with Dr. Puma Loza on Wednesday. Was trying to bend over to put items in her flower bed.  Also has a follow up

## 2023-11-15 ENCOUNTER — HOSPITAL ENCOUNTER (OUTPATIENT)
Dept: PHYSICAL THERAPY | Age: 78
Setting detail: THERAPIES SERIES
Discharge: HOME | End: 2023-11-15
Payer: MEDICARE

## 2023-11-15 PROCEDURE — 97140 MANUAL THERAPY 1/> REGIONS: CPT

## 2023-11-15 PROCEDURE — 97530 THERAPEUTIC ACTIVITIES: CPT

## 2023-11-15 PROCEDURE — 97110 THERAPEUTIC EXERCISES: CPT

## 2023-11-15 NOTE — FLOWSHEET NOTE
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, subsequent encounter [M97.12XD]  Treatment Diagnosis: impaired gait, dec BLE ROM and strength, impaired functional mobility, impaired balance        Insurance/Certification information:  PT Insurance Information: [employer] ($40 copay, Whitman Liz)  Physician Information:  Ness Kellogg DO    Plan of care signed (Y/N): []  Yes [x]  No     Date of Patient follow up with Physician: in January for her back     Progress Report: []  Yes  [x]  No     Date Range for reporting period:  Beginnin2023  PN:  23  POC: 10/16/23  Ending:     Progress report due (10 Rx/or 30 days whichever is less): visit #6 or     Recertification due (POC duration/ or 90 days whichever is less): visit #12 or 23    Visit # Insurance Allowable Auth required? Date Range     + 4/10 [employer] ($40 copay, auth req)  12V approved  [x]  Yes  []  No -10 add v  approved until 23       Latex Allergy:  [x]NO      []YES  Preferred Language for Healthcare:   [x]English       []other:    Functional Scale:                                                      Date assessed:  LEFS: raw score = 30; dysfunction = 62.5%                      23  LEFS: raw score =41; dysfunction = 51.25%  23  LEFS: raw score = 43; dysfunction = 46.25%  10/16/23  Pain level:  0/10 L knee, - 5-6/10 L side of low back     SUBJECTIVE:  Doing well. Had her appointment last Wednesday. States they got 2 new x-rays and stated that everything is healing nicely. Also told for her back pain that he wants her to wait until January for shots in her back. Is happy about her results with her leg.  States that she is having a cat scan for her

## 2023-11-27 ENCOUNTER — HOSPITAL ENCOUNTER (OUTPATIENT)
Dept: PHYSICAL THERAPY | Age: 78
Setting detail: THERAPIES SERIES
Discharge: HOME | End: 2023-11-27
Payer: MEDICARE

## 2023-11-27 PROCEDURE — 97530 THERAPEUTIC ACTIVITIES: CPT

## 2023-11-27 PROCEDURE — 97110 THERAPEUTIC EXERCISES: CPT

## 2023-11-27 NOTE — FLOWSHEET NOTE
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, subsequent encounter [M97.12XD]  Treatment Diagnosis: impaired gait, dec BLE ROM and strength, impaired functional mobility, impaired balance        Insurance/Certification information:  PT Insurance Information: [employer] ($40 copay, Uriah Lively)  Physician Information:  Skippy Bumps, DO    Plan of care signed (Y/N): []  Yes [x]  No     Date of Patient follow up with Physician: in January for her back     Progress Report: []  Yes  [x]  No     Date Range for reporting period:  Beginnin2023  PN:  23  POC: 10/16/23  Ending:     Progress report due (10 Rx/or 30 days whichever is less): visit #6 or 04    Recertification due (POC duration/ or 90 days whichever is less): visit #12 or 23    Visit # Insurance Allowable Auth required? Date Range     + 5/10 [employer] ($40 copay, auth req)  12V approved  [x]  Yes  []  No -10 add v  approved until 23       Latex Allergy:  [x]NO      []YES  Preferred Language for Healthcare:   [x]English       []other:    Functional Scale:                                                      Date assessed:  LEFS: raw score = 30; dysfunction = 62.5%                      23  LEFS: raw score =41; dysfunction = 51.25%  23  LEFS: raw score = 43; dysfunction = 46.25%  10/16/23  Pain level:  0/10 L knee, - 5-6/10 L side of low back     SUBJECTIVE:  Doing well. Had some pain in her L low back and into her L hip from travel. Was able to do her exercises. Doing well. Had her appointment last Wednesday. States they got 2 new x-rays and stated that everything is healing nicely.  Also told for her back pain that he wants her to wait until

## 2023-12-04 ENCOUNTER — HOSPITAL ENCOUNTER (OUTPATIENT)
Dept: PHYSICAL THERAPY | Age: 78
Setting detail: THERAPIES SERIES
Discharge: HOME | End: 2023-12-04
Payer: MEDICARE

## 2023-12-04 PROCEDURE — 97530 THERAPEUTIC ACTIVITIES: CPT

## 2023-12-04 PROCEDURE — 97110 THERAPEUTIC EXERCISES: CPT

## 2023-12-04 PROCEDURE — 97112 NEUROMUSCULAR REEDUCATION: CPT

## 2023-12-04 NOTE — FLOWSHEET NOTE
daily - 7 x weekly - 3 sets - 10 reps - 10 seconds hold  - Supine Lower Trunk Rotation  - 1 x daily - 7 x weekly - 3 sets - 10 reps  - Supine March  - 1 x daily - 7 x weekly - 3 sets - 10 reps  - Supine Short Arc Quad  - 1 x daily - 7 x weekly - 3 sets - 10 reps - 5 seconds hold  - Supine Hip Abduction  - 1 x daily - 7 x weekly - 3 sets - 10 reps  8/30:  Verbally added prone quad stretch with strap and lumbar forward and lateral stretching to HEP - PT reported no need for hand out. Access Code: [email or web address]  URL: [email or web address]  Date: 08/25/2023  Prepared by: Pola Comp    Exercises  - Seated Table Hamstring Stretch  - 1 x daily - 7 x weekly - 3 sets - 10 reps - 30 seconds hold  - Supine Heel Slide with Strap  - 1 x daily - 7 x weekly - 3 sets - 10 reps - 10 seconds hold  - Supine Quad Set  - 1 x daily - 7 x weekly - 3 sets - 10 reps    Therapeutic Exercise and NMR EXR  [x] (91635) Provided verbal/tactile cueing for activities related to strengthening, flexibility, endurance, ROM for improvements in LE, proximal hip, and core control with self care, mobility, lifting, ambulation.  [] (97480) Provided verbal/tactile cueing for activities related to improving balance, coordination, kinesthetic sense, posture, motor skill, proprioception  to assist with LE, proximal hip, and core control in self care, mobility, lifting, ambulation and eccentric single leg control.   [] (18679) Therapist is in constant attendance of 2 or more patients providing skilled therapy interventions, but not providing any significant amount of measurable one-on-one time to either patient, for improvements in LE, proximal hip, and core control in self care, mobility, lifting, ambulation and eccentric single leg control.      NMR and Therapeutic Activities:    [x] (26126 or 01611) Provided verbal/tactile cueing for activities related to improving balance, coordination, kinesthetic sense, posture, motor skill, proprioception and motor

## 2023-12-11 ENCOUNTER — HOSPITAL ENCOUNTER (OUTPATIENT)
Dept: PHYSICAL THERAPY | Age: 78
Setting detail: THERAPIES SERIES
Discharge: HOME | End: 2023-12-11
Payer: MEDICARE

## 2023-12-11 PROCEDURE — 97530 THERAPEUTIC ACTIVITIES: CPT

## 2023-12-11 PROCEDURE — 97116 GAIT TRAINING THERAPY: CPT

## 2023-12-11 PROCEDURE — 97110 THERAPEUTIC EXERCISES: CPT

## 2023-12-11 NOTE — FLOWSHEET NOTE
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, subsequent encounter [M97.12XD]  Treatment Diagnosis: impaired gait, dec BLE ROM and strength, impaired functional mobility, impaired balance        Insurance/Certification information:  PT Insurance Information: [employer] ($40 copay, Cinthia Yeboah)  Physician Information:  Micheline Swanson DO    Plan of care signed (Y/N): []  Yes [x]  No     Date of Patient follow up with Physician: in January for her back     Progress Report: []  Yes  [x]  No     Date Range for reporting period:  Beginnin2023  PN:  23  POC: 10/16/23  Ending:     Progress report due (10 Rx/or 30 days whichever is less): visit #6 or     Recertification due (POC duration/ or 90 days whichever is less): visit #12 or 23    Visit # Insurance Allowable Auth required? Date Range     + 7/10  PN npv [employer] ($40 copay, auth req)  12V approved  [x]  Yes  []  No -10 add v  approved until 23       Latex Allergy:  [x]NO      []YES  Preferred Language for Healthcare:   [x]English       []other:    Functional Scale:                                                      Date assessed:  LEFS: raw score = 30; dysfunction = 62.5%                      23  LEFS: raw score =41; dysfunction = 51.25%  23  LEFS: raw score = 43; dysfunction = 46.25%  10/16/23  Pain level:  0/10 L knee, - 5-6/10 L side of low back     SUBJECTIVE:  knee is feeling good today. On Saturday had some pain but states d/t rain. Reports reliving her fall experience when she walks through front door.     OBJECTIVE:   10/16:     PROM AROM     L R L R   Knee Flexion 105 deg (P) 120 deg  AROM 98 deg  AAROM: 100  deg    Knee Extension    0 deg

## 2023-12-18 ENCOUNTER — APPOINTMENT (OUTPATIENT)
Dept: PHYSICAL THERAPY | Age: 78
End: 2023-12-18
Payer: MEDICARE

## 2023-12-20 ENCOUNTER — APPOINTMENT (OUTPATIENT)
Dept: PHYSICAL THERAPY | Age: 78
End: 2023-12-20
Payer: MEDICARE

## 2023-12-27 ENCOUNTER — HOSPITAL ENCOUNTER (OUTPATIENT)
Dept: PHYSICAL THERAPY | Age: 78
Setting detail: THERAPIES SERIES
Discharge: HOME | End: 2023-12-27
Payer: MEDICARE

## 2023-12-27 PROCEDURE — 97110 THERAPEUTIC EXERCISES: CPT

## 2023-12-27 PROCEDURE — 97530 THERAPEUTIC ACTIVITIES: CPT

## 2024-02-24 ENCOUNTER — APPOINTMENT (OUTPATIENT)
Dept: CT IMAGING | Age: 79
DRG: 312 | End: 2024-02-24
Payer: MEDICARE

## 2024-02-24 ENCOUNTER — HOSPITAL ENCOUNTER (OUTPATIENT)
Dept: GENERAL RADIOLOGY | Age: 79
Discharge: HOME | End: 2024-02-24
Payer: MEDICARE

## 2024-02-24 ENCOUNTER — HOSPITAL ENCOUNTER (INPATIENT)
Age: 79
LOS: 3 days | Discharge: HOME | DRG: 312 | End: 2024-02-28
Attending: INTERNAL MEDICINE | Admitting: INTERNAL MEDICINE
Payer: MEDICARE

## 2024-02-24 ENCOUNTER — HOSPITAL ENCOUNTER (OUTPATIENT)
Age: 79
Discharge: HOME | End: 2024-02-24
Payer: MEDICARE

## 2024-02-24 DIAGNOSIS — S22.41XA CLOSED FRACTURE OF MULTIPLE RIBS OF RIGHT SIDE, INITIAL ENCOUNTER: ICD-10-CM

## 2024-02-24 DIAGNOSIS — R79.89 ELEVATED TROPONIN: ICD-10-CM

## 2024-02-24 DIAGNOSIS — R55 SYNCOPE AND COLLAPSE: Primary | ICD-10-CM

## 2024-02-24 DIAGNOSIS — R05.9 COUGH, UNSPECIFIED TYPE: ICD-10-CM

## 2024-02-24 LAB
ANION GAP SERPL CALCULATED.3IONS-SCNC: 12 MMOL/L (ref 3–16)
BASOPHILS # BLD: 0 K/UL (ref 0–0.2)
BASOPHILS NFR BLD: 0.3 %
BUN SERPL-MCNC: 31 MG/DL (ref 7–20)
CALCIUM SERPL-MCNC: 9.2 MG/DL (ref 8.3–10.6)
CHLORIDE SERPL-SCNC: 109 MMOL/L (ref 99–110)
CO2 SERPL-SCNC: 17 MMOL/L (ref 21–32)
CREAT SERPL-MCNC: 1.2 MG/DL (ref 0.6–1.2)
DEPRECATED RDW RBC AUTO: 17.9 % (ref 12.4–15.4)
EOSINOPHIL # BLD: 0 K/UL (ref 0–0.6)
EOSINOPHIL NFR BLD: 0.5 %
GFR SERPLBLD CREATININE-BSD FMLA CKD-EPI: 47 ML/MIN/{1.73_M2}
GLUCOSE SERPL-MCNC: 111 MG/DL (ref 70–99)
HCT VFR BLD AUTO: 30.4 % (ref 36–48)
HGB BLD-MCNC: 9.5 G/DL (ref 12–16)
LYMPHOCYTES # BLD: 2.2 K/UL (ref 1–5.1)
LYMPHOCYTES NFR BLD: 21.7 %
MCH RBC QN AUTO: 27.6 PG (ref 26–34)
MCHC RBC AUTO-ENTMCNC: 31.3 G/DL (ref 31–36)
MCV RBC AUTO: 88.3 FL (ref 80–100)
MONOCYTES # BLD: 0.7 K/UL (ref 0–1.3)
MONOCYTES NFR BLD: 7.4 %
NEUTROPHILS # BLD: 7 K/UL (ref 1.7–7.7)
NEUTROPHILS NFR BLD: 70.1 %
PLATELET # BLD AUTO: 257 K/UL (ref 135–450)
PMV BLD AUTO: 8.6 FL (ref 5–10.5)
POTASSIUM SERPL-SCNC: 4.2 MMOL/L (ref 3.5–5.1)
RBC # BLD AUTO: 3.44 M/UL (ref 4–5.2)
SODIUM SERPL-SCNC: 138 MMOL/L (ref 136–145)
TROPONIN, HIGH SENSITIVITY: 20 NG/L (ref 0–14)
WBC # BLD AUTO: 9.9 K/UL (ref 4–11)

## 2024-02-24 PROCEDURE — 93005 ELECTROCARDIOGRAM TRACING: CPT | Performed by: INTERNAL MEDICINE

## 2024-02-24 PROCEDURE — 71046 X-RAY EXAM CHEST 2 VIEWS: CPT

## 2024-02-24 PROCEDURE — 80048 BASIC METABOLIC PNL TOTAL CA: CPT

## 2024-02-24 PROCEDURE — 6370000000 HC RX 637 (ALT 250 FOR IP): Performed by: INTERNAL MEDICINE

## 2024-02-24 PROCEDURE — 70450 CT HEAD/BRAIN W/O DYE: CPT

## 2024-02-24 PROCEDURE — 71250 CT THORAX DX C-: CPT

## 2024-02-24 PROCEDURE — 84484 ASSAY OF TROPONIN QUANT: CPT

## 2024-02-24 PROCEDURE — 99285 EMERGENCY DEPT VISIT HI MDM: CPT

## 2024-02-24 PROCEDURE — 85025 COMPLETE CBC W/AUTO DIFF WBC: CPT

## 2024-02-24 RX ORDER — METHOCARBAMOL 500 MG/1
500 TABLET, FILM COATED ORAL ONCE
Status: COMPLETED | OUTPATIENT
Start: 2024-02-24 | End: 2024-02-24

## 2024-02-24 RX ADMIN — METHOCARBAMOL 500 MG: 500 TABLET ORAL at 22:49

## 2024-02-24 ASSESSMENT — PAIN DESCRIPTION - LOCATION: LOCATION: CHEST

## 2024-02-24 ASSESSMENT — PAIN DESCRIPTION - FREQUENCY: FREQUENCY: CONTINUOUS

## 2024-02-24 ASSESSMENT — PAIN DESCRIPTION - ORIENTATION: ORIENTATION: RIGHT

## 2024-02-24 ASSESSMENT — PAIN SCALES - GENERAL: PAINLEVEL_OUTOF10: 10

## 2024-02-24 ASSESSMENT — PAIN - FUNCTIONAL ASSESSMENT: PAIN_FUNCTIONAL_ASSESSMENT: 0-10

## 2024-02-24 ASSESSMENT — PAIN DESCRIPTION - PAIN TYPE: TYPE: ACUTE PAIN

## 2024-02-25 PROBLEM — R55 SYNCOPE AND COLLAPSE: Status: ACTIVE | Noted: 2024-02-25

## 2024-02-25 LAB
25(OH)D3 SERPL-MCNC: 47.4 NG/ML
BILIRUB UR QL STRIP.AUTO: NEGATIVE
CLARITY UR: CLEAR
COLOR UR: YELLOW
EKG ATRIAL RATE: 78 BPM
EKG DIAGNOSIS: NORMAL
EKG P AXIS: 80 DEGREES
EKG P-R INTERVAL: 150 MS
EKG Q-T INTERVAL: 394 MS
EKG QRS DURATION: 94 MS
EKG QTC CALCULATION (BAZETT): 449 MS
EKG R AXIS: -30 DEGREES
EKG T AXIS: 46 DEGREES
EKG VENTRICULAR RATE: 78 BPM
FERRITIN SERPL IA-MCNC: 72.4 NG/ML (ref 15–150)
GLUCOSE UR STRIP.AUTO-MCNC: NEGATIVE MG/DL
HGB UR QL STRIP.AUTO: NEGATIVE
IRON SATN MFR SERPL: 19 % (ref 15–50)
IRON SERPL-MCNC: 35 UG/DL (ref 37–145)
KETONES UR STRIP.AUTO-MCNC: NEGATIVE MG/DL
LEUKOCYTE ESTERASE UR QL STRIP.AUTO: NEGATIVE
NITRITE UR QL STRIP.AUTO: NEGATIVE
PH UR STRIP.AUTO: 6 [PH] (ref 5–8)
PROT UR STRIP.AUTO-MCNC: NEGATIVE MG/DL
SP GR UR STRIP.AUTO: 1.02 (ref 1–1.03)
TIBC SERPL-MCNC: 185 UG/DL (ref 260–445)
TROPONIN, HIGH SENSITIVITY: 19 NG/L (ref 0–14)
UA DIPSTICK W REFLEX MICRO PNL UR: NORMAL
URN SPEC COLLECT METH UR: NORMAL
UROBILINOGEN UR STRIP-ACNC: 0.2 E.U./DL
VIT B12 SERPL-MCNC: 300 PG/ML (ref 211–911)

## 2024-02-25 PROCEDURE — 6360000002 HC RX W HCPCS: Performed by: NURSE PRACTITIONER

## 2024-02-25 PROCEDURE — 6360000002 HC RX W HCPCS: Performed by: INTERNAL MEDICINE

## 2024-02-25 PROCEDURE — 93010 ELECTROCARDIOGRAM REPORT: CPT | Performed by: INTERNAL MEDICINE

## 2024-02-25 PROCEDURE — 2580000003 HC RX 258: Performed by: INTERNAL MEDICINE

## 2024-02-25 PROCEDURE — 1200000000 HC SEMI PRIVATE

## 2024-02-25 PROCEDURE — 81003 URINALYSIS AUTO W/O SCOPE: CPT

## 2024-02-25 PROCEDURE — 83550 IRON BINDING TEST: CPT

## 2024-02-25 PROCEDURE — 51798 US URINE CAPACITY MEASURE: CPT

## 2024-02-25 PROCEDURE — 84484 ASSAY OF TROPONIN QUANT: CPT

## 2024-02-25 PROCEDURE — 6370000000 HC RX 637 (ALT 250 FOR IP): Performed by: INTERNAL MEDICINE

## 2024-02-25 PROCEDURE — 83540 ASSAY OF IRON: CPT

## 2024-02-25 PROCEDURE — 36415 COLL VENOUS BLD VENIPUNCTURE: CPT

## 2024-02-25 PROCEDURE — 82607 VITAMIN B-12: CPT

## 2024-02-25 PROCEDURE — 82306 VITAMIN D 25 HYDROXY: CPT

## 2024-02-25 PROCEDURE — 82728 ASSAY OF FERRITIN: CPT

## 2024-02-25 RX ORDER — HYDROMORPHONE HYDROCHLORIDE 1 MG/ML
0.25 INJECTION, SOLUTION INTRAMUSCULAR; INTRAVENOUS; SUBCUTANEOUS
Status: DISCONTINUED | OUTPATIENT
Start: 2024-02-25 | End: 2024-02-26

## 2024-02-25 RX ORDER — POLYETHYLENE GLYCOL 3350 17 G/17G
17 POWDER, FOR SOLUTION ORAL DAILY PRN
Status: DISCONTINUED | OUTPATIENT
Start: 2024-02-25 | End: 2024-02-28 | Stop reason: HOSPADM

## 2024-02-25 RX ORDER — SODIUM CHLORIDE 9 MG/ML
INJECTION, SOLUTION INTRAVENOUS PRN
Status: DISCONTINUED | OUTPATIENT
Start: 2024-02-25 | End: 2024-02-28 | Stop reason: HOSPADM

## 2024-02-25 RX ORDER — ACETAMINOPHEN 325 MG/1
650 TABLET ORAL EVERY 6 HOURS SCHEDULED
Status: DISCONTINUED | OUTPATIENT
Start: 2024-02-25 | End: 2024-02-28 | Stop reason: HOSPADM

## 2024-02-25 RX ORDER — DOXAZOSIN MESYLATE 4 MG/1
4 TABLET ORAL NIGHTLY
Status: DISCONTINUED | OUTPATIENT
Start: 2024-02-25 | End: 2024-02-28 | Stop reason: HOSPADM

## 2024-02-25 RX ORDER — SODIUM CHLORIDE 0.9 % (FLUSH) 0.9 %
5-40 SYRINGE (ML) INJECTION PRN
Status: DISCONTINUED | OUTPATIENT
Start: 2024-02-25 | End: 2024-02-28 | Stop reason: HOSPADM

## 2024-02-25 RX ORDER — ACETAMINOPHEN 650 MG/1
650 SUPPOSITORY RECTAL EVERY 6 HOURS PRN
Status: DISCONTINUED | OUTPATIENT
Start: 2024-02-25 | End: 2024-02-25

## 2024-02-25 RX ORDER — POTASSIUM CHLORIDE 7.45 MG/ML
10 INJECTION INTRAVENOUS PRN
Status: DISCONTINUED | OUTPATIENT
Start: 2024-02-25 | End: 2024-02-28 | Stop reason: HOSPADM

## 2024-02-25 RX ORDER — FLUTICASONE PROPIONATE 50 MCG
1 SPRAY, SUSPENSION (ML) NASAL 2 TIMES DAILY
Status: DISCONTINUED | OUTPATIENT
Start: 2024-02-25 | End: 2024-02-25

## 2024-02-25 RX ORDER — LANOLIN ALCOHOL/MO/W.PET/CERES
3 CREAM (GRAM) TOPICAL NIGHTLY PRN
Status: DISCONTINUED | OUTPATIENT
Start: 2024-02-25 | End: 2024-02-28 | Stop reason: HOSPADM

## 2024-02-25 RX ORDER — TERAZOSIN 1 MG/1
1 CAPSULE ORAL NIGHTLY
Status: DISCONTINUED | OUTPATIENT
Start: 2024-02-25 | End: 2024-02-25 | Stop reason: CLARIF

## 2024-02-25 RX ORDER — SODIUM CHLORIDE 9 MG/ML
INJECTION, SOLUTION INTRAVENOUS CONTINUOUS
Status: DISCONTINUED | OUTPATIENT
Start: 2024-02-25 | End: 2024-02-27

## 2024-02-25 RX ORDER — FENTANYL CITRATE 50 UG/ML
25 INJECTION, SOLUTION INTRAMUSCULAR; INTRAVENOUS ONCE
Status: DISCONTINUED | OUTPATIENT
Start: 2024-02-25 | End: 2024-02-25

## 2024-02-25 RX ORDER — OXYCODONE HCL 5 MG/5 ML
5 SOLUTION, ORAL ORAL EVERY 4 HOURS PRN
Status: DISCONTINUED | OUTPATIENT
Start: 2024-02-25 | End: 2024-02-26

## 2024-02-25 RX ORDER — MAGNESIUM SULFATE IN WATER 40 MG/ML
2000 INJECTION, SOLUTION INTRAVENOUS PRN
Status: DISCONTINUED | OUTPATIENT
Start: 2024-02-25 | End: 2024-02-28 | Stop reason: HOSPADM

## 2024-02-25 RX ORDER — SENNA AND DOCUSATE SODIUM 50; 8.6 MG/1; MG/1
2 TABLET, FILM COATED ORAL DAILY PRN
Status: DISCONTINUED | OUTPATIENT
Start: 2024-02-25 | End: 2024-02-28 | Stop reason: HOSPADM

## 2024-02-25 RX ORDER — TRAMADOL HYDROCHLORIDE 50 MG/1
50 TABLET ORAL EVERY 6 HOURS PRN
Status: DISCONTINUED | OUTPATIENT
Start: 2024-02-25 | End: 2024-02-28 | Stop reason: HOSPADM

## 2024-02-25 RX ORDER — METHOCARBAMOL 750 MG/1
750 TABLET, FILM COATED ORAL 4 TIMES DAILY
Status: DISCONTINUED | OUTPATIENT
Start: 2024-02-25 | End: 2024-02-26

## 2024-02-25 RX ORDER — KETOROLAC TROMETHAMINE 15 MG/ML
15 INJECTION, SOLUTION INTRAMUSCULAR; INTRAVENOUS ONCE
Status: COMPLETED | OUTPATIENT
Start: 2024-02-25 | End: 2024-02-25

## 2024-02-25 RX ORDER — TRAZODONE HYDROCHLORIDE 50 MG/1
100 TABLET ORAL NIGHTLY
Status: DISCONTINUED | OUTPATIENT
Start: 2024-02-25 | End: 2024-02-25

## 2024-02-25 RX ORDER — ACETAMINOPHEN 325 MG/1
650 TABLET ORAL EVERY 6 HOURS PRN
Status: DISCONTINUED | OUTPATIENT
Start: 2024-02-25 | End: 2024-02-25

## 2024-02-25 RX ORDER — LISINOPRIL 10 MG/1
40 TABLET ORAL NIGHTLY
Status: DISCONTINUED | OUTPATIENT
Start: 2024-02-25 | End: 2024-02-28 | Stop reason: HOSPADM

## 2024-02-25 RX ORDER — ONDANSETRON 4 MG/1
4 TABLET, ORALLY DISINTEGRATING ORAL EVERY 8 HOURS PRN
Status: DISCONTINUED | OUTPATIENT
Start: 2024-02-25 | End: 2024-02-28 | Stop reason: HOSPADM

## 2024-02-25 RX ORDER — PREDNISOLONE ACETATE 10 MG/ML
1 SUSPENSION/ DROPS OPHTHALMIC
Status: DISCONTINUED | OUTPATIENT
Start: 2024-02-25 | End: 2024-02-28 | Stop reason: HOSPADM

## 2024-02-25 RX ORDER — FAMOTIDINE 20 MG/1
40 TABLET, FILM COATED ORAL DAILY
Status: DISCONTINUED | OUTPATIENT
Start: 2024-02-25 | End: 2024-02-27

## 2024-02-25 RX ORDER — LATANOPROST 50 UG/ML
1 SOLUTION/ DROPS OPHTHALMIC NIGHTLY
Status: DISCONTINUED | OUTPATIENT
Start: 2024-02-25 | End: 2024-02-28 | Stop reason: HOSPADM

## 2024-02-25 RX ORDER — POTASSIUM CHLORIDE 20 MEQ/1
40 TABLET, EXTENDED RELEASE ORAL PRN
Status: DISCONTINUED | OUTPATIENT
Start: 2024-02-25 | End: 2024-02-28 | Stop reason: HOSPADM

## 2024-02-25 RX ORDER — ONDANSETRON 2 MG/ML
4 INJECTION INTRAMUSCULAR; INTRAVENOUS EVERY 6 HOURS PRN
Status: DISCONTINUED | OUTPATIENT
Start: 2024-02-25 | End: 2024-02-28 | Stop reason: HOSPADM

## 2024-02-25 RX ORDER — SODIUM CHLORIDE 0.9 % (FLUSH) 0.9 %
5-40 SYRINGE (ML) INJECTION EVERY 12 HOURS SCHEDULED
Status: DISCONTINUED | OUTPATIENT
Start: 2024-02-25 | End: 2024-02-28 | Stop reason: HOSPADM

## 2024-02-25 RX ORDER — HYDROCODONE BITARTRATE AND ACETAMINOPHEN 7.5; 325 MG/1; MG/1
1 TABLET ORAL EVERY 4 HOURS PRN
Status: DISCONTINUED | OUTPATIENT
Start: 2024-02-25 | End: 2024-02-25

## 2024-02-25 RX ADMIN — MELATONIN TAB 3 MG 3 MG: 3 TAB at 21:46

## 2024-02-25 RX ADMIN — SODIUM CHLORIDE, PRESERVATIVE FREE 10 ML: 5 INJECTION INTRAVENOUS at 10:27

## 2024-02-25 RX ADMIN — METHOCARBAMOL 750 MG: 750 TABLET ORAL at 21:47

## 2024-02-25 RX ADMIN — LISINOPRIL 40 MG: 10 TABLET ORAL at 21:45

## 2024-02-25 RX ADMIN — Medication 400 UNITS: at 21:46

## 2024-02-25 RX ADMIN — IRON SUCROSE 200 MG: 20 INJECTION, SOLUTION INTRAVENOUS at 11:48

## 2024-02-25 RX ADMIN — OXYCODONE HYDROCHLORIDE 5 MG: 5 SOLUTION ORAL at 15:37

## 2024-02-25 RX ADMIN — SODIUM CHLORIDE: 9 INJECTION, SOLUTION INTRAVENOUS at 10:26

## 2024-02-25 RX ADMIN — HYDROCODONE BITARTRATE AND ACETAMINOPHEN 1 TABLET: 7.5; 325 TABLET ORAL at 11:37

## 2024-02-25 RX ADMIN — SODIUM CHLORIDE: 9 INJECTION, SOLUTION INTRAVENOUS at 21:44

## 2024-02-25 RX ADMIN — FAMOTIDINE 40 MG: 20 TABLET, FILM COATED ORAL at 08:52

## 2024-02-25 RX ADMIN — HYDROMORPHONE HYDROCHLORIDE 0.25 MG: 1 INJECTION, SOLUTION INTRAMUSCULAR; INTRAVENOUS; SUBCUTANEOUS at 05:07

## 2024-02-25 RX ADMIN — APIXABAN 5 MG: 5 TABLET, FILM COATED ORAL at 21:46

## 2024-02-25 RX ADMIN — KETOROLAC TROMETHAMINE 15 MG: 15 INJECTION, SOLUTION INTRAMUSCULAR; INTRAVENOUS at 03:21

## 2024-02-25 RX ADMIN — TRAMADOL HYDROCHLORIDE 50 MG: 50 TABLET ORAL at 21:46

## 2024-02-25 RX ADMIN — HYDROMORPHONE HYDROCHLORIDE 0.25 MG: 1 INJECTION, SOLUTION INTRAMUSCULAR; INTRAVENOUS; SUBCUTANEOUS at 08:50

## 2024-02-25 RX ADMIN — METHOCARBAMOL 750 MG: 750 TABLET ORAL at 17:40

## 2024-02-25 RX ADMIN — ACETAMINOPHEN 650 MG: 325 TABLET ORAL at 17:40

## 2024-02-25 RX ADMIN — DOXAZOSIN 4 MG: 4 TABLET ORAL at 21:45

## 2024-02-25 RX ADMIN — APIXABAN 5 MG: 5 TABLET, FILM COATED ORAL at 05:07

## 2024-02-25 ASSESSMENT — PAIN SCALES - GENERAL
PAINLEVEL_OUTOF10: 9
PAINLEVEL_OUTOF10: 9
PAINLEVEL_OUTOF10: 7
PAINLEVEL_OUTOF10: 7
PAINLEVEL_OUTOF10: 8
PAINLEVEL_OUTOF10: 10
PAINLEVEL_OUTOF10: 4
PAINLEVEL_OUTOF10: 8
PAINLEVEL_OUTOF10: 7
PAINLEVEL_OUTOF10: 7
PAINLEVEL_OUTOF10: 5
PAINLEVEL_OUTOF10: 10

## 2024-02-25 ASSESSMENT — PAIN DESCRIPTION - ORIENTATION
ORIENTATION: MID;RIGHT
ORIENTATION: RIGHT;LEFT
ORIENTATION: RIGHT
ORIENTATION: RIGHT
ORIENTATION: RIGHT;LEFT
ORIENTATION: RIGHT;LEFT
ORIENTATION: LEFT;RIGHT
ORIENTATION: RIGHT;LEFT

## 2024-02-25 ASSESSMENT — PAIN DESCRIPTION - LOCATION
LOCATION: RIB CAGE
LOCATION: CHEST
LOCATION: CHEST
LOCATION: RIB CAGE
LOCATION: RIB CAGE
LOCATION: CHEST
LOCATION: RIB CAGE

## 2024-02-25 ASSESSMENT — PAIN DESCRIPTION - PAIN TYPE
TYPE: ACUTE PAIN
TYPE: ACUTE PAIN

## 2024-02-25 ASSESSMENT — PAIN DESCRIPTION - DESCRIPTORS
DESCRIPTORS: ACHING
DESCRIPTORS: ACHING;STABBING
DESCRIPTORS: SHARP
DESCRIPTORS: SHARP
DESCRIPTORS: ACHING
DESCRIPTORS: ACHING;THROBBING
DESCRIPTORS: STABBING

## 2024-02-25 ASSESSMENT — PAIN SCALES - WONG BAKER
WONGBAKER_NUMERICALRESPONSE: 2
WONGBAKER_NUMERICALRESPONSE: 4

## 2024-02-25 ASSESSMENT — PAIN - FUNCTIONAL ASSESSMENT: PAIN_FUNCTIONAL_ASSESSMENT: ACTIVITIES ARE NOT PREVENTED

## 2024-02-25 ASSESSMENT — PAIN DESCRIPTION - FREQUENCY: FREQUENCY: CONTINUOUS

## 2024-02-25 NOTE — ED PROVIDER NOTES
EMERGENCY MEDICINE PROVIDER NOTE    Patient Identification  Pt Name: Nimisha Joshi  MRN: [id number]  Birthdate 1945  Date of evaluation: 2/24/2024  Provider: JEFFY COATS DO  PCP: Giulia Hunt DO    Chief Complaint  Loss of Consciousness (Pt present following a syncopal episode 4 days ago. Pt had a bowel movement, and became light headed. Pt felt herself beginning to pass out so she attempted to lay down, Pt ended up passing out on the floor and getting her head stuck between the toilet and wall. Pt had to fight for several minutes to get her self out. Pt now has chest pain with movement and inspiration. Pt had an outpatient chest xray today. )      HPI  (History provided by patient)  This is a 78 y.o. female who was brought in by self for diffuse chest pain and fall patient tells me on Tuesday she was going to the bathroom and felt as though she was going to pass out.  She then said she fell and was stuck between the toilet and the tub.  She believes she passed out for short period time.  She denies any headache.  Patient denies neck pain.  She did state that she had difficulty getting out of the situation where she was stuck.  After work, and she has diffuse anterior chest pain which is worse with movement.  The pain is severe.  Patient denies any shortness of breath.    I have reviewed the following nursing documentation:  Allergies: Fentanyl and No known allergies    Past medical history:   Past Medical History:   Diagnosis Date    GERD (gastroesophageal reflux disease)     Hyperlipidemia     Hypertension      Past surgical history:   Past Surgical History:   Procedure Laterality Date    BREAST SURGERY Right     right breast, calcification removed    HYSTERECTOMY (CERVIX STATUS UNKNOWN)      1995       Home medications:   Current Discharge Medication List        CONTINUE these medications which have NOT CHANGED    Details   ELIQUIS 5 MG TABS tablet       famotidine (PEPCID) 40 MG tablet

## 2024-02-25 NOTE — ED NOTES
High Sensitivity 20 (*)     All other components within normal limits   TROPONIN - Abnormal; Notable for the following components:    Troponin, High Sensitivity 19 (*)     All other components within normal limits    Narrative:     Collection has been rescheduled by SRIRAM at 02/25/2024 00:02      Critical values: no     Abnormal Assessment Findings: na    Background  History:   Past Medical History:   Diagnosis Date    GERD (gastroesophageal reflux disease)     Hyperlipidemia     Hypertension        Assessment    Vitals/MEWS: MEWS Score: 1  Level of Consciousness: Alert (0)   Vitals:    02/24/24 2226   BP: 136/74   Pulse: 88   Resp: 18   Temp: 98.7 °F (37.1 °C)   TempSrc: Oral   SpO2: 98%   Weight: 72.6 kg (160 lb)     FiO2 (%): na  O2 Flow Rate: O2 Device: None (Room air)    Cardiac Rhythm:    Pain Assessment:  [x] Verbal [] Hernandez Kramer Scale  Pain Scale: Pain Assessment  Pain Assessment: 0-10  Pain Level: 10  Pain Location: Chest  Pain Orientation: Right  Pain Type: Acute pain  Pain Frequency: Continuous  Last documented pain score (0-10 scale) Pain Level: 10  Last documented pain medication administered: see MAR   Mental Status: oriented, alert, coherent, logical, thought processes intact, and able to concentrate and follow conversation  Orientation Level:    NIH Score:    C-SSRS: Risk of Suicide: No Risk  Bedside swallow:    Frankie Coma Scale (GCS): Rome Coma Scale  Eye Opening: Spontaneous  Best Verbal Response: Oriented  Best Motor Response: Obeys commands  Frankie Coma Scale Score: 15  Active LDA's:   Peripheral IV 02/25/24 Left Antecubital (Active)     PO Status: Regular  Pertinent or High Risk Medications/Drips: no   If Yes, please provide details: na  Pending Blood Product Administration: no       You may also review the ED PT Care Timeline found under the Summary Nursing Index tab.    Recommendation    Pending orders - ED orders complete    Plan for Discharge (if known):   Additional Comments: na  If

## 2024-02-25 NOTE — H&P
bronchi are grossly patent. Negative for subcutaneous emphysema. Upper Abdomen: No free fluid in the upper abdomen. Soft Tissues/Bones: Spondylosis.  No visualized sternal fracture.  Negative for retrosternal hematoma.  Buckling of the anterior 5th, 6th, 7th ribs. These are nondisplaced fractures.  Osteoarthritic changes of the glenohumeral and sternoclavicular joints.     Nondisplaced anterior rib fractures on the right.  No pneumothorax     CT HEAD WO CONTRAST    Result Date: 2/24/2024  EXAMINATION: CT OF THE HEAD WITHOUT CONTRAST  2/24/2024 10:45 pm TECHNIQUE: CT of the head was performed without the administration of intravenous contrast. Automated exposure control, iterative reconstruction, and/or weight based adjustment of the mA/kV was utilized to reduce the radiation dose to as low as reasonably achievable. COMPARISON: None HISTORY: ORDERING SYSTEM PROVIDED HISTORY: dizziness,fall, + LOC TECHNOLOGIST PROVIDED HISTORY: Reason for exam:->dizziness,fall, + LOC Has a \"code stroke\" or \"stroke alert\" been called?->No Decision Support Exception - unselect if not a suspected or confirmed emergency medical condition->Emergency Medical Condition (MA) Reason for Exam: Loss of Consciousness (Pt present following a syncopal episode 4 days ago. Pt had a bowel movement, and became light headed. Pt felt herself beginning to pass out so she attempted to lay down, Pt ended up passing out on the floor and getting her head stuck between the toilet and wall. Pt had to fight for several minutes to get her self out. Pt now has chest pain with movement and inspiration. Pt had an outpatient chest xray today. ) FINDINGS: BRAIN/VENTRICLES: The ventricles are mildly enlarged there is diffuse mild prominence of the cortical sulci.  There is mild periventricular low density bilaterally.  No intracranial hemorrhage or edema is seen.  There is no extra-axial fluid collection or mass ORBITS: The visualized portion of the orbits demonstrate

## 2024-02-26 PROBLEM — I48.0 PAF (PAROXYSMAL ATRIAL FIBRILLATION) (HCC): Status: ACTIVE | Noted: 2024-02-26

## 2024-02-26 PROBLEM — S22.41XA MULTIPLE CLOSED FRACTURES OF RIBS OF RIGHT SIDE: Status: ACTIVE | Noted: 2024-02-26

## 2024-02-26 PROBLEM — H81.13 BENIGN PAROXYSMAL VERTIGO OF BOTH EARS: Status: ACTIVE | Noted: 2024-02-26

## 2024-02-26 LAB
ANION GAP SERPL CALCULATED.3IONS-SCNC: 10 MMOL/L (ref 3–16)
BASOPHILS # BLD: 0 K/UL (ref 0–0.2)
BASOPHILS NFR BLD: 0.4 %
BUN SERPL-MCNC: 14 MG/DL (ref 7–20)
CALCIUM SERPL-MCNC: 8.7 MG/DL (ref 8.3–10.6)
CHLORIDE SERPL-SCNC: 113 MMOL/L (ref 99–110)
CO2 SERPL-SCNC: 17 MMOL/L (ref 21–32)
CREAT SERPL-MCNC: 0.6 MG/DL (ref 0.6–1.2)
DEPRECATED RDW RBC AUTO: 17.4 % (ref 12.4–15.4)
EOSINOPHIL # BLD: 0.1 K/UL (ref 0–0.6)
EOSINOPHIL NFR BLD: 1 %
FOLATE SERPL-MCNC: 12.3 NG/ML (ref 4.78–24.2)
GFR SERPLBLD CREATININE-BSD FMLA CKD-EPI: >60 ML/MIN/{1.73_M2}
GLUCOSE SERPL-MCNC: 97 MG/DL (ref 70–99)
HCT VFR BLD AUTO: 26.6 % (ref 36–48)
HGB BLD-MCNC: 8.5 G/DL (ref 12–16)
LYMPHOCYTES # BLD: 1.7 K/UL (ref 1–5.1)
LYMPHOCYTES NFR BLD: 22.5 %
MCH RBC QN AUTO: 27.8 PG (ref 26–34)
MCHC RBC AUTO-ENTMCNC: 32.1 G/DL (ref 31–36)
MCV RBC AUTO: 86.5 FL (ref 80–100)
MONOCYTES # BLD: 0.6 K/UL (ref 0–1.3)
MONOCYTES NFR BLD: 7.6 %
NEUTROPHILS # BLD: 5.3 K/UL (ref 1.7–7.7)
NEUTROPHILS NFR BLD: 68.5 %
PLATELET # BLD AUTO: 245 K/UL (ref 135–450)
PMV BLD AUTO: 8.1 FL (ref 5–10.5)
POTASSIUM SERPL-SCNC: 4 MMOL/L (ref 3.5–5.1)
RBC # BLD AUTO: 3.07 M/UL (ref 4–5.2)
SODIUM SERPL-SCNC: 140 MMOL/L (ref 136–145)
VIT B12 SERPL-MCNC: 276 PG/ML (ref 211–911)
WBC # BLD AUTO: 7.7 K/UL (ref 4–11)

## 2024-02-26 PROCEDURE — APPNB30 APP NON BILLABLE TIME 0-30 MINS

## 2024-02-26 PROCEDURE — 6370000000 HC RX 637 (ALT 250 FOR IP): Performed by: INTERNAL MEDICINE

## 2024-02-26 PROCEDURE — 97116 GAIT TRAINING THERAPY: CPT

## 2024-02-26 PROCEDURE — 93880 EXTRACRANIAL BILAT STUDY: CPT

## 2024-02-26 PROCEDURE — 1200000000 HC SEMI PRIVATE

## 2024-02-26 PROCEDURE — 94760 N-INVAS EAR/PLS OXIMETRY 1: CPT

## 2024-02-26 PROCEDURE — 80048 BASIC METABOLIC PNL TOTAL CA: CPT

## 2024-02-26 PROCEDURE — 36415 COLL VENOUS BLD VENIPUNCTURE: CPT

## 2024-02-26 PROCEDURE — 97530 THERAPEUTIC ACTIVITIES: CPT

## 2024-02-26 PROCEDURE — 2580000003 HC RX 258: Performed by: INTERNAL MEDICINE

## 2024-02-26 PROCEDURE — 97161 PT EVAL LOW COMPLEX 20 MIN: CPT

## 2024-02-26 PROCEDURE — 85025 COMPLETE CBC W/AUTO DIFF WBC: CPT

## 2024-02-26 PROCEDURE — 82607 VITAMIN B-12: CPT

## 2024-02-26 PROCEDURE — 51798 US URINE CAPACITY MEASURE: CPT

## 2024-02-26 PROCEDURE — 82746 ASSAY OF FOLIC ACID SERUM: CPT

## 2024-02-26 PROCEDURE — 97535 SELF CARE MNGMENT TRAINING: CPT

## 2024-02-26 PROCEDURE — 6360000002 HC RX W HCPCS: Performed by: INTERNAL MEDICINE

## 2024-02-26 PROCEDURE — 99222 1ST HOSP IP/OBS MODERATE 55: CPT | Performed by: PSYCHIATRY & NEUROLOGY

## 2024-02-26 PROCEDURE — APPSS60 APP SPLIT SHARED TIME 46-60 MINUTES

## 2024-02-26 PROCEDURE — 97166 OT EVAL MOD COMPLEX 45 MIN: CPT

## 2024-02-26 RX ORDER — DILTIAZEM HYDROCHLORIDE 120 MG/1
120 CAPSULE, COATED, EXTENDED RELEASE ORAL DAILY
Status: DISCONTINUED | OUTPATIENT
Start: 2024-02-26 | End: 2024-02-28 | Stop reason: HOSPADM

## 2024-02-26 RX ORDER — GABAPENTIN 100 MG/1
100 CAPSULE ORAL 2 TIMES DAILY
COMMUNITY

## 2024-02-26 RX ORDER — TROSPIUM CHLORIDE 20 MG/1
20 TABLET, FILM COATED ORAL
Status: DISCONTINUED | OUTPATIENT
Start: 2024-02-26 | End: 2024-02-28 | Stop reason: HOSPADM

## 2024-02-26 RX ORDER — OXYCODONE HCL 5 MG/5 ML
5 SOLUTION, ORAL ORAL EVERY 6 HOURS PRN
Status: DISCONTINUED | OUTPATIENT
Start: 2024-02-26 | End: 2024-02-26

## 2024-02-26 RX ORDER — GABAPENTIN 100 MG/1
100 CAPSULE ORAL 2 TIMES DAILY
Status: DISCONTINUED | OUTPATIENT
Start: 2024-02-26 | End: 2024-02-28 | Stop reason: HOSPADM

## 2024-02-26 RX ORDER — TAMSULOSIN HYDROCHLORIDE 0.4 MG/1
0.8 CAPSULE ORAL DAILY
Status: ON HOLD | COMMUNITY
End: 2024-02-28 | Stop reason: HOSPADM

## 2024-02-26 RX ORDER — TAMSULOSIN HYDROCHLORIDE 0.4 MG/1
0.8 CAPSULE ORAL DAILY
Status: DISCONTINUED | OUTPATIENT
Start: 2024-02-26 | End: 2024-02-26

## 2024-02-26 RX ORDER — BRIMONIDINE TARTRATE 2 MG/ML
1 SOLUTION/ DROPS OPHTHALMIC 3 TIMES DAILY
Status: DISCONTINUED | OUTPATIENT
Start: 2024-02-26 | End: 2024-02-28 | Stop reason: HOSPADM

## 2024-02-26 RX ORDER — SOLIFENACIN SUCCINATE 5 MG/1
5 TABLET, FILM COATED ORAL DAILY
COMMUNITY

## 2024-02-26 RX ORDER — METHOCARBAMOL 750 MG/1
750 TABLET, FILM COATED ORAL 3 TIMES DAILY PRN
Status: DISCONTINUED | OUTPATIENT
Start: 2024-02-26 | End: 2024-02-28 | Stop reason: HOSPADM

## 2024-02-26 RX ORDER — DILTIAZEM HYDROCHLORIDE 120 MG/1
120 CAPSULE, COATED, EXTENDED RELEASE ORAL DAILY
COMMUNITY

## 2024-02-26 RX ORDER — TERAZOSIN 2 MG/1
4 CAPSULE ORAL NIGHTLY
COMMUNITY

## 2024-02-26 RX ORDER — BRIMONIDINE TARTRATE 2 MG/ML
1 SOLUTION/ DROPS OPHTHALMIC 3 TIMES DAILY
COMMUNITY

## 2024-02-26 RX ADMIN — LATANOPROST 1 DROP: 50 SOLUTION OPHTHALMIC at 21:11

## 2024-02-26 RX ADMIN — BRIMONIDINE TARTRATE 1 DROP: 2 SOLUTION OPHTHALMIC at 16:30

## 2024-02-26 RX ADMIN — APIXABAN 5 MG: 5 TABLET, FILM COATED ORAL at 20:18

## 2024-02-26 RX ADMIN — ACETAMINOPHEN 650 MG: 325 TABLET ORAL at 00:56

## 2024-02-26 RX ADMIN — OXYCODONE HYDROCHLORIDE 5 MG: 5 SOLUTION ORAL at 03:27

## 2024-02-26 RX ADMIN — METHOCARBAMOL 750 MG: 750 TABLET ORAL at 09:53

## 2024-02-26 RX ADMIN — DOXAZOSIN 4 MG: 4 TABLET ORAL at 20:18

## 2024-02-26 RX ADMIN — PREDNISOLONE ACETATE 1 DROP: 10 SUSPENSION/ DROPS OPHTHALMIC at 09:53

## 2024-02-26 RX ADMIN — TRAMADOL HYDROCHLORIDE 50 MG: 50 TABLET ORAL at 11:27

## 2024-02-26 RX ADMIN — LATANOPROST 1 DROP: 50 SOLUTION OPHTHALMIC at 00:57

## 2024-02-26 RX ADMIN — ACETAMINOPHEN 650 MG: 325 TABLET ORAL at 16:30

## 2024-02-26 RX ADMIN — OXYCODONE HYDROCHLORIDE 5 MG: 5 SOLUTION ORAL at 10:02

## 2024-02-26 RX ADMIN — FAMOTIDINE 40 MG: 20 TABLET, FILM COATED ORAL at 09:53

## 2024-02-26 RX ADMIN — TRAMADOL HYDROCHLORIDE 50 MG: 50 TABLET ORAL at 20:17

## 2024-02-26 RX ADMIN — DILTIAZEM HYDROCHLORIDE 120 MG: 120 CAPSULE, EXTENDED RELEASE ORAL at 16:30

## 2024-02-26 RX ADMIN — METHOCARBAMOL 750 MG: 750 TABLET ORAL at 13:13

## 2024-02-26 RX ADMIN — SODIUM CHLORIDE: 9 INJECTION, SOLUTION INTRAVENOUS at 18:26

## 2024-02-26 RX ADMIN — APIXABAN 5 MG: 5 TABLET, FILM COATED ORAL at 09:53

## 2024-02-26 RX ADMIN — BRIMONIDINE TARTRATE 1 DROP: 2 SOLUTION OPHTHALMIC at 20:19

## 2024-02-26 RX ADMIN — GABAPENTIN 100 MG: 100 CAPSULE ORAL at 16:30

## 2024-02-26 RX ADMIN — MELATONIN TAB 3 MG 3 MG: 3 TAB at 20:18

## 2024-02-26 RX ADMIN — ACETAMINOPHEN 650 MG: 325 TABLET ORAL at 05:27

## 2024-02-26 RX ADMIN — GABAPENTIN 100 MG: 100 CAPSULE ORAL at 20:19

## 2024-02-26 RX ADMIN — ACETAMINOPHEN 650 MG: 325 TABLET ORAL at 13:13

## 2024-02-26 RX ADMIN — TROSPIUM CHLORIDE 20 MG: 20 TABLET, FILM COATED ORAL at 16:30

## 2024-02-26 RX ADMIN — Medication 400 UNITS: at 20:19

## 2024-02-26 RX ADMIN — IRON SUCROSE 200 MG: 20 INJECTION, SOLUTION INTRAVENOUS at 13:08

## 2024-02-26 ASSESSMENT — PAIN SCALES - GENERAL
PAINLEVEL_OUTOF10: 6
PAINLEVEL_OUTOF10: 6
PAINLEVEL_OUTOF10: 7
PAINLEVEL_OUTOF10: 9
PAINLEVEL_OUTOF10: 5
PAINLEVEL_OUTOF10: 5
PAINLEVEL_OUTOF10: 4
PAINLEVEL_OUTOF10: 0
PAINLEVEL_OUTOF10: 9
PAINLEVEL_OUTOF10: 7
PAINLEVEL_OUTOF10: 7
PAINLEVEL_OUTOF10: 5
PAINLEVEL_OUTOF10: 6

## 2024-02-26 ASSESSMENT — PAIN DESCRIPTION - ORIENTATION
ORIENTATION: ANTERIOR;UPPER
ORIENTATION: RIGHT;LOWER
ORIENTATION: ANTERIOR;UPPER

## 2024-02-26 ASSESSMENT — PAIN DESCRIPTION - LOCATION
LOCATION: RIB CAGE
LOCATION: RIB CAGE;CHEST
LOCATION: RIB CAGE;CHEST
LOCATION: RIB CAGE
LOCATION: CHEST

## 2024-02-26 ASSESSMENT — PAIN DESCRIPTION - DESCRIPTORS
DESCRIPTORS: DISCOMFORT

## 2024-02-26 NOTE — CONSULTS
Dr. Richardson, further recommendations to follow      Thank you for referring such patient. If you have any questions regarding my consult note, please don't hesitate to call me.     Johnny Hernandes, DAVID - CNP      Attending Supervising Physician's Attestation Statement      The patient was seen 2/26/2024 in conjunction with the nurse practitioner with independent history, evaluation and examination. I agree with the note which has been adjusted to reflect my findings, with the addition of the following:         The patient is 78 y.o.  female  who was admitted for   acute dizziness and presyncope.  Symptoms started over the weekend.  Description feeling lightheaded and dizzy upon standing using bathroom mat.  She remembers the whole incident and she does not feel she lost consciousness.  She fell on the floor but no LOC or head trauma.  She was admitted to the hospital.  Further imaging with CT showed no acute findings.  Today she feels back to her baseline.  She does describe chronic vertigo but no recent worsening.  She tried meclizine in the past.    On examination:  No acute distress  Awake and alert x3.  Fluent speech.  Appears appropriate with intact recent and remote memory.  Pupil reactive and symmetric, extraocular motor intact, no ophthalmoplegia, face is symmetric and tongue is midline  No focal weakness with symmetric DTR  Normal tone  No sensory disturbance or abnormal movement    Impression:  Acute dizziness with presyncope likely mechanical fall, vasovagal syncope and less likely seizure  Chronic vertigo, chronic vestibular disorder  Mechanical fall  Rib fracture  A-fib        Recommendation:  Lengthy discussion with the patient regarding regarding risk of falling on blood thinner, vestibular precautions and risk of injury  PT and OT  Check orthostatics  Continue Eliquis  Anemia workup  Avoid sedative, Skelaxin and use meclizine only as needed  Blood pressure monitor  Can be discharged once medically

## 2024-02-26 NOTE — ACP (ADVANCE CARE PLANNING)
Advance Care Planning Note       Purpose of Encounter: Advanced care planning in light of hospitalization presyncope and fall     Parties in attendance: :Nimisha Joshi, MASON OBREGON MD  Decisional Capacity:Yes  Objective:  This 78-year-old female with PMHx of hypertension, hyperlipidemia, A-fib, vertigo chronic pain syndrome presented with dizziness and fall   Goals of Care Determinations:Pt  wants full support measures including CPR, intubation, trach, PEG & HTN  Code Status: Full  Time Spent on Advanced Planning Documents: 16 mins.  Advanced Care Planning Documents: Completed advances directives, advanced directives in chart.      Electronically signed by MASON OBREGON MD on 2/26/2024 at 4:57 PM

## 2024-02-27 PROCEDURE — 97530 THERAPEUTIC ACTIVITIES: CPT

## 2024-02-27 PROCEDURE — 6370000000 HC RX 637 (ALT 250 FOR IP): Performed by: NURSE PRACTITIONER

## 2024-02-27 PROCEDURE — 6360000004 HC RX CONTRAST MEDICATION: Performed by: INTERNAL MEDICINE

## 2024-02-27 PROCEDURE — 6360000002 HC RX W HCPCS: Performed by: INTERNAL MEDICINE

## 2024-02-27 PROCEDURE — 6370000000 HC RX 637 (ALT 250 FOR IP): Performed by: INTERNAL MEDICINE

## 2024-02-27 PROCEDURE — 97535 SELF CARE MNGMENT TRAINING: CPT

## 2024-02-27 PROCEDURE — 97116 GAIT TRAINING THERAPY: CPT

## 2024-02-27 PROCEDURE — 2580000003 HC RX 258: Performed by: INTERNAL MEDICINE

## 2024-02-27 PROCEDURE — C8929 TTE W OR WO FOL WCON,DOPPLER: HCPCS

## 2024-02-27 PROCEDURE — 1200000000 HC SEMI PRIVATE

## 2024-02-27 RX ORDER — FAMOTIDINE 20 MG/1
20 TABLET, FILM COATED ORAL 2 TIMES DAILY
Status: DISCONTINUED | OUTPATIENT
Start: 2024-02-27 | End: 2024-02-28 | Stop reason: HOSPADM

## 2024-02-27 RX ADMIN — LATANOPROST 1 DROP: 50 SOLUTION OPHTHALMIC at 20:11

## 2024-02-27 RX ADMIN — BRIMONIDINE TARTRATE 1 DROP: 2 SOLUTION OPHTHALMIC at 09:44

## 2024-02-27 RX ADMIN — DILTIAZEM HYDROCHLORIDE 120 MG: 120 CAPSULE, EXTENDED RELEASE ORAL at 09:40

## 2024-02-27 RX ADMIN — MELATONIN TAB 3 MG 3 MG: 3 TAB at 20:11

## 2024-02-27 RX ADMIN — APIXABAN 5 MG: 5 TABLET, FILM COATED ORAL at 09:40

## 2024-02-27 RX ADMIN — METHOCARBAMOL 750 MG: 750 TABLET ORAL at 00:17

## 2024-02-27 RX ADMIN — BRIMONIDINE TARTRATE 1 DROP: 2 SOLUTION OPHTHALMIC at 14:25

## 2024-02-27 RX ADMIN — GABAPENTIN 100 MG: 100 CAPSULE ORAL at 20:11

## 2024-02-27 RX ADMIN — DOXAZOSIN 4 MG: 4 TABLET ORAL at 20:10

## 2024-02-27 RX ADMIN — METHOCARBAMOL 750 MG: 750 TABLET ORAL at 09:40

## 2024-02-27 RX ADMIN — PREDNISOLONE ACETATE 1 DROP: 10 SUSPENSION/ DROPS OPHTHALMIC at 12:38

## 2024-02-27 RX ADMIN — PERFLUTREN 1.5 ML: 6.52 INJECTION, SUSPENSION INTRAVENOUS at 11:33

## 2024-02-27 RX ADMIN — TRAMADOL HYDROCHLORIDE 50 MG: 50 TABLET ORAL at 05:23

## 2024-02-27 RX ADMIN — GABAPENTIN 100 MG: 100 CAPSULE ORAL at 09:40

## 2024-02-27 RX ADMIN — TRAMADOL HYDROCHLORIDE 50 MG: 50 TABLET ORAL at 20:11

## 2024-02-27 RX ADMIN — APIXABAN 5 MG: 5 TABLET, FILM COATED ORAL at 20:11

## 2024-02-27 RX ADMIN — Medication 400 UNITS: at 20:11

## 2024-02-27 RX ADMIN — FAMOTIDINE 20 MG: 20 TABLET, FILM COATED ORAL at 20:34

## 2024-02-27 RX ADMIN — PREDNISOLONE ACETATE 1 DROP: 10 SUSPENSION/ DROPS OPHTHALMIC at 14:25

## 2024-02-27 RX ADMIN — ACETAMINOPHEN 650 MG: 325 TABLET ORAL at 05:23

## 2024-02-27 RX ADMIN — TROSPIUM CHLORIDE 20 MG: 20 TABLET, FILM COATED ORAL at 05:23

## 2024-02-27 RX ADMIN — FAMOTIDINE 40 MG: 20 TABLET, FILM COATED ORAL at 09:40

## 2024-02-27 RX ADMIN — ACETAMINOPHEN 650 MG: 325 TABLET ORAL at 12:38

## 2024-02-27 RX ADMIN — TRAMADOL HYDROCHLORIDE 50 MG: 50 TABLET ORAL at 12:38

## 2024-02-27 RX ADMIN — METHOCARBAMOL 750 MG: 750 TABLET ORAL at 23:32

## 2024-02-27 RX ADMIN — TROSPIUM CHLORIDE 20 MG: 20 TABLET, FILM COATED ORAL at 17:17

## 2024-02-27 RX ADMIN — ACETAMINOPHEN 650 MG: 325 TABLET ORAL at 00:17

## 2024-02-27 RX ADMIN — METHOCARBAMOL 750 MG: 750 TABLET ORAL at 14:32

## 2024-02-27 RX ADMIN — IRON SUCROSE 200 MG: 20 INJECTION, SOLUTION INTRAVENOUS at 14:24

## 2024-02-27 RX ADMIN — BRIMONIDINE TARTRATE 1 DROP: 2 SOLUTION OPHTHALMIC at 20:11

## 2024-02-27 RX ADMIN — ACETAMINOPHEN 650 MG: 325 TABLET ORAL at 17:17

## 2024-02-27 ASSESSMENT — PAIN SCALES - GENERAL
PAINLEVEL_OUTOF10: 6
PAINLEVEL_OUTOF10: 7
PAINLEVEL_OUTOF10: 2
PAINLEVEL_OUTOF10: 7
PAINLEVEL_OUTOF10: 8
PAINLEVEL_OUTOF10: 8

## 2024-02-27 ASSESSMENT — PAIN DESCRIPTION - LOCATION
LOCATION: RIB CAGE
LOCATION: BACK;RIB CAGE
LOCATION: RIB CAGE

## 2024-02-27 ASSESSMENT — PAIN DESCRIPTION - DESCRIPTORS
DESCRIPTORS: STABBING
DESCRIPTORS: ACHING

## 2024-02-27 ASSESSMENT — PAIN DESCRIPTION - ORIENTATION
ORIENTATION: RIGHT
ORIENTATION: RIGHT;LEFT
ORIENTATION: LEFT

## 2024-02-27 NOTE — DISCHARGE INSTR - COC
Continuity of Care Form    Patient Name: Nimisha Joshi   :  1945  MRN:  [id number]    Admit date:  2024  Discharge date:  24    Code Status Order: Full Code   Advance Directives:     Admitting Physician:  Leyla Dutton MD  PCP: Giulia Hunt DO    Discharging Nurse: ***  Discharging Hospital Unit/Room#: 4TN-4482/4482-01  Discharging Unit Phone Number: [phone]    Emergency Contact:   Extended Emergency Contact Information  Primary Emergency Contact: Obey Joshi  Home Phone: [phone]  Relation: Child    Past Surgical History:  Past Surgical History:   Procedure Laterality Date    BREAST SURGERY Right     right breast, calcification removed    HYSTERECTOMY (CERVIX STATUS UNKNOWN)             Immunization History:   Immunization History   Administered Date(s) Administered    COVID-19, PFIZER Bivalent, DO NOT Dilute, (age 12y+), IM, 30 mcg/0.3 mL 10/20/2022    COVID-19, PFIZER GRAY top, DO NOT Dilute, (age 12 y+), IM, 30 mcg/0.3 mL 2022    COVID-19, PFIZER PURPLE top, DILUTE for use, (age 12 y+), 30mcg/0.3mL 2021       Active Problems:  Patient Active Problem List   Diagnosis Code    Abnormal mammogram with microcalcification R92.0    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    Conductive hearing loss in left ear H90.12    Impacted cerumen of left ear H61.22    Asymmetrical right sensorineural hearing loss [id number]    Ear pressure, right H93.8X1    Bilateral sensorineural hearing loss H90.3    Sudden idiopathic hearing loss of right ear with restricted hearing of left ear H91.21    Eustachian tube dysfunction, bilateral H69.93    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

## 2024-02-27 NOTE — PLAN OF CARE
Problem: Discharge Planning  Goal: Discharge to home or other facility with appropriate resources  2/27/2024 0959 by Ambreen Alvarez RN  Outcome: Progressing  2/26/2024 2159 by Suzanne Santos RN  Outcome: Progressing     Problem: Pain  Goal: Verbalizes/displays adequate comfort level or baseline comfort level  2/27/2024 0959 by Ambreen Alvarez RN  Outcome: Progressing  2/26/2024 2159 by Suzanne Santos RN  Outcome: Progressing     Problem: Safety - Adult  Goal: Free from fall injury  2/27/2024 0959 by Ambreen Alvarez RN  Outcome: Progressing  2/26/2024 2159 by Suzanne Santos RN  Outcome: Progressing     Problem: ABCDS Injury Assessment  Goal: Absence of physical injury  2/27/2024 0959 by Ambreen Alvarez, RN  Outcome: Progressing  2/26/2024 2159 by Suzanne Santos RN  Outcome: Progressing     Problem: Skin/Tissue Integrity  Goal: Absence of new skin breakdown  Description: 1.  Monitor for areas of redness and/or skin breakdown  2.  Assess vascular access sites hourly  3.  Every 4-6 hours minimum:  Change oxygen saturation probe site  4.  Every 4-6 hours:  If on nasal continuous positive airway pressure, respiratory therapy assess nares and determine need for appliance change or resting period.  2/27/2024 0959 by Ambreen Alvarez, RN  Outcome: Progressing  2/26/2024 2159 by Suzanne Santos RN  Outcome: Progressing

## 2024-02-28 VITALS
OXYGEN SATURATION: 94 % | TEMPERATURE: 97.7 F | DIASTOLIC BLOOD PRESSURE: 76 MMHG | HEART RATE: 74 BPM | SYSTOLIC BLOOD PRESSURE: 151 MMHG | BODY MASS INDEX: 30.25 KG/M2 | RESPIRATION RATE: 18 BRPM | WEIGHT: 160 LBS

## 2024-02-28 LAB
ANION GAP SERPL CALCULATED.3IONS-SCNC: 9 MMOL/L (ref 3–16)
ANISOCYTOSIS BLD QL SMEAR: ABNORMAL
BASOPHILS # BLD: 0 K/UL (ref 0–0.2)
BASOPHILS NFR BLD: 0 %
BUN SERPL-MCNC: 11 MG/DL (ref 7–20)
CALCIUM SERPL-MCNC: 9.3 MG/DL (ref 8.3–10.6)
CHLORIDE SERPL-SCNC: 112 MMOL/L (ref 99–110)
CO2 SERPL-SCNC: 19 MMOL/L (ref 21–32)
CREAT SERPL-MCNC: 0.6 MG/DL (ref 0.6–1.2)
DEPRECATED RDW RBC AUTO: 17.2 % (ref 12.4–15.4)
EOSINOPHIL # BLD: 0 K/UL (ref 0–0.6)
EOSINOPHIL NFR BLD: 0 %
GFR SERPLBLD CREATININE-BSD FMLA CKD-EPI: >60 ML/MIN/{1.73_M2}
GLUCOSE SERPL-MCNC: 138 MG/DL (ref 70–99)
HCT VFR BLD AUTO: 29.1 % (ref 36–48)
HGB BLD-MCNC: 9.4 G/DL (ref 12–16)
LYMPHOCYTES # BLD: 1.8 K/UL (ref 1–5.1)
LYMPHOCYTES NFR BLD: 24 %
MCH RBC QN AUTO: 28.1 PG (ref 26–34)
MCHC RBC AUTO-ENTMCNC: 32.5 G/DL (ref 31–36)
MCV RBC AUTO: 86.3 FL (ref 80–100)
MONOCYTES # BLD: 0.5 K/UL (ref 0–1.3)
MONOCYTES NFR BLD: 7 %
NEUTROPHILS # BLD: 5 K/UL (ref 1.7–7.7)
NEUTROPHILS NFR BLD: 67 %
NEUTS BAND NFR BLD MANUAL: 2 % (ref 0–7)
OVALOCYTES BLD QL SMEAR: ABNORMAL
PLATELET # BLD AUTO: 283 K/UL (ref 135–450)
PLATELET BLD QL SMEAR: ADEQUATE
PMV BLD AUTO: 8.2 FL (ref 5–10.5)
POLYCHROMASIA BLD QL SMEAR: ABNORMAL
POTASSIUM SERPL-SCNC: 3.7 MMOL/L (ref 3.5–5.1)
RBC # BLD AUTO: 3.37 M/UL (ref 4–5.2)
SLIDE REVIEW: ABNORMAL
SODIUM SERPL-SCNC: 140 MMOL/L (ref 136–145)
WBC # BLD AUTO: 7.3 K/UL (ref 4–11)

## 2024-02-28 PROCEDURE — 6370000000 HC RX 637 (ALT 250 FOR IP): Performed by: INTERNAL MEDICINE

## 2024-02-28 PROCEDURE — 97530 THERAPEUTIC ACTIVITIES: CPT

## 2024-02-28 PROCEDURE — 97535 SELF CARE MNGMENT TRAINING: CPT

## 2024-02-28 PROCEDURE — 85025 COMPLETE CBC W/AUTO DIFF WBC: CPT

## 2024-02-28 PROCEDURE — 6370000000 HC RX 637 (ALT 250 FOR IP): Performed by: NURSE PRACTITIONER

## 2024-02-28 PROCEDURE — 97110 THERAPEUTIC EXERCISES: CPT

## 2024-02-28 PROCEDURE — 36415 COLL VENOUS BLD VENIPUNCTURE: CPT

## 2024-02-28 PROCEDURE — 80048 BASIC METABOLIC PNL TOTAL CA: CPT

## 2024-02-28 RX ORDER — LIDOCAINE 4 G/G
1 PATCH TOPICAL DAILY
Qty: 30 PATCH | Refills: 0 | Status: SHIPPED | OUTPATIENT
Start: 2024-02-28 | End: 2024-02-28

## 2024-02-28 RX ORDER — LIDOCAINE 4 G/G
1 PATCH TOPICAL DAILY
Qty: 15 EACH | Refills: 0 | Status: SHIPPED | OUTPATIENT
Start: 2024-02-28 | End: 2024-03-14

## 2024-02-28 RX ORDER — METHOCARBAMOL 750 MG/1
750 TABLET, FILM COATED ORAL 2 TIMES DAILY PRN
Qty: 6 TABLET | Refills: 0 | Status: SHIPPED | OUTPATIENT
Start: 2024-02-28 | End: 2024-02-28

## 2024-02-28 RX ORDER — METHOCARBAMOL 750 MG/1
750 TABLET, FILM COATED ORAL NIGHTLY PRN
Qty: 3 TABLET | Refills: 0 | Status: SHIPPED | OUTPATIENT
Start: 2024-02-28 | End: 2024-03-02

## 2024-02-28 RX ADMIN — BRIMONIDINE TARTRATE 1 DROP: 2 SOLUTION OPHTHALMIC at 10:01

## 2024-02-28 RX ADMIN — TRAMADOL HYDROCHLORIDE 50 MG: 50 TABLET ORAL at 03:48

## 2024-02-28 RX ADMIN — APIXABAN 5 MG: 5 TABLET, FILM COATED ORAL at 09:56

## 2024-02-28 RX ADMIN — ACETAMINOPHEN 650 MG: 325 TABLET ORAL at 06:27

## 2024-02-28 RX ADMIN — FAMOTIDINE 20 MG: 20 TABLET, FILM COATED ORAL at 09:55

## 2024-02-28 RX ADMIN — TRAMADOL HYDROCHLORIDE 50 MG: 50 TABLET ORAL at 09:55

## 2024-02-28 RX ADMIN — GABAPENTIN 100 MG: 100 CAPSULE ORAL at 09:55

## 2024-02-28 RX ADMIN — DILTIAZEM HYDROCHLORIDE 120 MG: 120 CAPSULE, EXTENDED RELEASE ORAL at 09:57

## 2024-02-28 RX ADMIN — ACETAMINOPHEN 650 MG: 325 TABLET ORAL at 13:47

## 2024-02-28 RX ADMIN — PREDNISOLONE ACETATE 1 DROP: 10 SUSPENSION/ DROPS OPHTHALMIC at 10:01

## 2024-02-28 RX ADMIN — METHOCARBAMOL 750 MG: 750 TABLET ORAL at 13:48

## 2024-02-28 RX ADMIN — TROSPIUM CHLORIDE 20 MG: 20 TABLET, FILM COATED ORAL at 06:27

## 2024-02-28 RX ADMIN — BRIMONIDINE TARTRATE 1 DROP: 2 SOLUTION OPHTHALMIC at 13:49

## 2024-02-28 ASSESSMENT — PAIN SCALES - GENERAL
PAINLEVEL_OUTOF10: 4
PAINLEVEL_OUTOF10: 6
PAINLEVEL_OUTOF10: 7
PAINLEVEL_OUTOF10: 6

## 2024-02-28 ASSESSMENT — PAIN DESCRIPTION - LOCATION
LOCATION: BACK;CHEST
LOCATION: RIB CAGE
LOCATION: BACK;CHEST
LOCATION: RIB CAGE

## 2024-02-28 ASSESSMENT — PAIN DESCRIPTION - DESCRIPTORS
DESCRIPTORS: ACHING
DESCRIPTORS: ACHING

## 2024-02-28 ASSESSMENT — PAIN DESCRIPTION - ORIENTATION
ORIENTATION: MID
ORIENTATION: MID
ORIENTATION: RIGHT

## 2024-02-28 ASSESSMENT — PAIN DESCRIPTION - PAIN TYPE: TYPE: ACUTE PAIN;CHRONIC PAIN

## 2024-02-28 NOTE — DISCHARGE INSTRUCTIONS
Follow up with your PCP within 4-5  days of discharge.  Have your PCP arrange cardiac monitor upon discharge  Make appointment with your cardiology upon discharge  Take all your medications as prescribed.

## 2024-02-28 NOTE — PLAN OF CARE
Problem: Discharge Planning  Goal: Discharge to home or other facility with appropriate resources  2/28/2024 1429 by Monico Lee RN  Outcome: Completed  2/28/2024 1429 by Monico Lee RN  Outcome: Progressing  2/28/2024 1221 by Moncio Lee RN  Outcome: Progressing     Problem: Pain  Goal: Verbalizes/displays adequate comfort level or baseline comfort level  2/28/2024 1429 by Monico Lee RN  Outcome: Completed  2/28/2024 1429 by Monico Lee RN  Outcome: Progressing  2/28/2024 1221 by Monico Lee RN  Outcome: Progressing     Problem: Safety - Adult  Goal: Free from fall injury  2/28/2024 1429 by Monico Lee RN  Outcome: Completed  2/28/2024 1429 by Monico Lee RN  Outcome: Progressing  2/28/2024 1221 by Monico Lee RN  Outcome: Progressing     Problem: ABCDS Injury Assessment  Goal: Absence of physical injury  2/28/2024 1429 by Monico Lee RN  Outcome: Completed  2/28/2024 1429 by Monico Lee RN  Outcome: Progressing  2/28/2024 1221 by Monico Lee RN  Outcome: Progressing     Problem: Skin/Tissue Integrity  Goal: Absence of new skin breakdown  Description: 1.  Monitor for areas of redness and/or skin breakdown  2.  Assess vascular access sites hourly  3.  Every 4-6 hours minimum:  Change oxygen saturation probe site  4.  Every 4-6 hours:  If on nasal continuous positive airway pressure, respiratory therapy assess nares and determine need for appliance change or resting period.  2/28/2024 1429 by Monico Lee RN  Outcome: Completed  2/28/2024 1429 by Monico Lee RN  Outcome: Progressing  2/28/2024 1221 by Monico Lee RN  Outcome: Progressing

## 2024-02-28 NOTE — DISCHARGE SUMMARY
Hospital Medicine Discharge Summary    Patient ID: Nimisha Joshi      Patient's PCP: Giulia Hunt DO    Admit Date: 2/24/2024     Discharge Date: 2/28/2024     Admitting Physician: Leyla Dutton MD     Discharge Physician: MASON OBREGON MD     Hospital Course: This 78-year-old female with PMHx of hypertension, hyperlipidemia, A-fib, vertigo chronic pain syndrome presented with dizziness and fall      Acute dizziness with presyncope and fall likely vasovagal/peripheral vertigo  Patient with known history of chronic vertigo; takes Antivert as needed  CT head negative for any acute intracranial pathology.  Carotid Dopplers negative.  Echo showed showed EF of 65 to 70%.  Mild AS, mild to moderate mitral regurg, moderate TR  Neurology consulted; recommended to avoid avoid sedating medications.  Antivert as needed  Patient was instructed to follow-up with her PCP for arranging cardiac monitor to rule out arrhythmias     Rib fractures; secondary to fall  CT chest showed multiple rib fractures. Continue as needed prn pain meds and lidocaine pass    PAOLA; likely prerenal.  Resolved with IV fluids     Hypertension; Continue lisinopril and monitor BP closely     Paroxysmal atrial fibrillation; on diltiazem and Eliquis.      Physical Exam Performed:     BP (!) 151/76   Pulse 74   Temp 97.7 °F (36.5 °C) (Oral)   Resp 18   Wt 72.6 kg (160 lb)   SpO2 94%   BMI 30.25 kg/m²     General appearance: No apparent distress, appears stated age and cooperative.  Eyes: Sclera clear. Pupils equal.  ENT: Moist oral mucosa. Trachea midline, no adenopathy.  Cardiovascular: Regular rhythm, normal S1, S2. No murmur.   Respiratory: Clear to auscultation bilaterally, no wheeze or crackles.   GI: Abdomen soft, no tenderness, not distended, normal bowel sounds  Musculoskeletal:  No cyanosis in digits.  No BLE edema present  Neurology: CN 2-12 grossly intact. No speech or motor deficits  Psych: Not agitated, appropriate affect.  Skin:

## 2024-02-28 NOTE — PROGRESS NOTES
Hospitalist Progress Note      PCP: Giulia Hunt DO    Date of Admission: 2/24/2024    Chief Complaint: Syncope    Hospital Course:   Patient feels well  Has some pain in the chest on both the sides after the fall hence not able to move  Admitting history reviewed  Lightheaded on standing  Received some IV fluids at the PCP office  No other chest pain shortness of breath no fever      Medications:  Reviewed      Exam:    /78   Pulse 75   Temp 98.1 °F (36.7 °C) (Oral)   Resp 15   Wt 72.6 kg (160 lb)   SpO2 96%   BMI 30.25 kg/m²     General appearance: No apparent distress, appears stated age and cooperative.  HEENT: Pupils equal, round, and reactive to light. Conjunctivae/corneas clear.  Neck: Supple, with full range of motion. No jugular venous distention. Trachea midline.  Respiratory:  Normal respiratory effort. Clear to auscultation, bilaterally without RALES/WHEEZES/Rhonchi.  Tenderness to palpation on both sides of the chest on superficial palpation  Cardiovascular: Regular rate and rhythm with normal S1/S2 without MURMURS, rubs or gallops.  Abdomen: Soft, non-tender, non-distended with normal bowel sounds.  Musculoskeletal: No clubbing, cyanosis or EDEMA bilaterally.  Full range of motion without deformity.  Skin: Skin color, texture, turgor normal.  No rashes or lesions.  Neurologic:  Neurovascularly intact without any focal sensory/motor deficits. Cranial nerves: II-XII intact, grossly non-focal.      Labs:   Recent Labs     02/24/24  2313   WBC 9.9   HGB 9.5*   HCT 30.4*        Recent Labs     02/24/24  2313      K 4.2      CO2 17*   BUN 31*   CREATININE 1.2   CALCIUM 9.2     No results for input(s): \"AST\", \"ALT\", \"BILIDIR\", \"BILITOT\", \"ALKPHOS\" in the last 72 hours.  No results for input(s): \"INR\" in the last 72 hours.  No results for input(s): \"CKTOTAL\", \"TROPONINI\" in the last 72 hours.    Urinalysis:    No results found for: \"NITRU\", \"WBCUA\", \"BACTERIA\", \"RBCUA\", 
        Hospitalist Progress Note      PCP: Giulia Hunt DO    Date of Admission: 2/24/2024    Chief Complaint: Syncope    Hospital Course: This 78-year-old female with PMHx of hypertension, hyperlipidemia, A-fib, vertigo chronic pain syndrome presented with dizziness and fall    Interval history:  Pt seen and examined today.  Overnight events noted, interval ancillary notes and labs reviewed.   Reported rib pain but denied any fever, chills, headache, dysarthria, dysphagia, bowel or bladder dysfunction      Assessment and plan:    Acute dizziness with presyncope and fall likely vasovagal/orthostatic hypotension/peripheral vertigo  Patient with known history of chronic vertigo; takes Antivert as needed  CT head negative for any acute intracranial pathology.  Carotid Dopplers negative.  Echo pending  Neurology consulted; check orthostatic vitals.  Avoid sedating medications.  Antivert as needed  PT/OT consulted.  Maintain fall precautions    Rib fractures; secondary to fall  CT chest showed multiple rib fractures. Start scheduled Tylenol and lidocaine patch.    Continue as needed motor relaxant and pain meds. Incentive spirometry    PAOLA; likely prerenal.  Resolved with IV fluids  Avoid nephrotoxin, strict and output, daily weights and monitor renal function closely    Hypertension; Continue lisinopril and monitor BP closely    Paroxysmal atrial fibrillation; on diltiazem and Eliquis.  Monitor on telemetry      DVT Prophylaxis: On Eliquis  Diet: ADULT DIET; Regular  Code Status: Full Code      Dispo - once acute medical processes have resolved        Medications:  Reviewed      Exam:    BP (!) 145/80   Pulse 75   Temp 98.1 °F (36.7 °C) (Oral)   Resp 18   Wt 72.6 kg (160 lb)   SpO2 95%   BMI 30.25 kg/m²     General appearance: No apparent distress, appears stated age and cooperative.  HEENT: Pupils equal, round, and reactive to light. Conjunctivae/corneas clear.  Neck: Supple, with full range of motion. No 
  [facility] - Inpatient Rehabilitation Department   Phone: [phone]    Physical Therapy    [] Initial Evaluation            [x] Daily Treatment Note         [] Discharge Summary      Patient: Nimisha Joshi   : 1945   MRN: [id number]   Date of Service:  2024  Admitting Diagnosis: Syncope and collapse  Current Admission Summary: Nimisha Joshi is a 78 y.o. female with pmh of hypertension, paroxysmal atrial fibrillation on Eliquis, chronic pain syndrome on oxycodone's who was well until this evening when after going to the bathroom she made to stand up and started to have dizziness.  She has had similar episodes in the past she has been evaluated for vertigo extensively because at the time she also had hearing loss in the right ear and for the most part this came up negative.  She fell and lost consciousness within the bathtub and the toilet bowl when she came to she was on the floor she was able to wiggle her way out and she remained sore on the right side of the rib pain to some extent on the left side.  She did not pursue medical care at that time but progressively the pain got worse even with deep breathing and hence the reason for presentation. Nondisplaced anterior rib fractures on the right ribs 2-5.     Past Medical History:  has a past medical history of GERD (gastroesophageal reflux disease), Hyperlipidemia, and Hypertension.  Past Surgical History:  has a past surgical history that includes Hysterectomy and Breast surgery (Right).    Discharge Recommendations: Nimisha Joshi scored a 18/24 on the AM-PAC short mobility form. Current research shows that an AM-PAC score of 18 or greater is typically associated with a discharge to the patient's home setting. Based on the patient's AM-PAC score and their current functional mobility deficits, it is recommended that the patient have 2-3 sessions per week of Physical Therapy at d/c to increase the patient's independence.  At this time, this patient 
  [facility] - Inpatient Rehabilitation Department   Phone: [phone]    Physical Therapy    [] Initial Evaluation            [x] Daily Treatment Note         [] Discharge Summary      Patient: Nimisha Joshi   : 1945   MRN: [id number]   Date of Service:  2024  Admitting Diagnosis: Syncope and collapse  Current Admission Summary: Nimisha Joshi is a 78 y.o. female with pmh of hypertension, paroxysmal atrial fibrillation on Eliquis, chronic pain syndrome on oxycodone's who was well until this evening when after going to the bathroom she made to stand up and started to have dizziness.  She has had similar episodes in the past she has been evaluated for vertigo extensively because at the time she also had hearing loss in the right ear and for the most part this came up negative.  She fell and lost consciousness within the bathtub and the toilet bowl when she came to she was on the floor she was able to wiggle her way out and she remained sore on the right side of the rib pain to some extent on the left side.  She did not pursue medical care at that time but progressively the pain got worse even with deep breathing and hence the reason for presentation. Nondisplaced anterior rib fractures on the right ribs 2-5.     Past Medical History:  has a past medical history of GERD (gastroesophageal reflux disease), Hyperlipidemia, and Hypertension.  Past Surgical History:  has a past surgical history that includes Hysterectomy and Breast surgery (Right).    Discharge Recommendations: Nimisha Joshi scored a 19/24 on the AM-PAC short mobility form. Current research shows that an AM-PAC score of 18 or greater is typically associated with a discharge to the patient's home setting. Based on the patient's AM-PAC score and their current functional mobility deficits, it is recommended that the patient have 2-3 sessions per week of Physical Therapy at d/c to increase the patient's independence.  At this time, this patient 
  [facility] - Inpatient Rehabilitation Department   Phone: [phone]    Occupational Therapy    [] Initial Evaluation            [x] Daily Treatment Note         [] Discharge Summary      Patient: Nimisha Joshi   : 1945   MRN: [id number]   Date of Service:  2024    Admitting Diagnosis:  Syncope and collapse  Current Admission Summary: Nimisha Joshi is a 78 y.o. female with pmh of hypertension, paroxysmal atrial fibrillation on Eliquis, chronic pain syndrome on oxycodone's who was well until this evening when after going to the bathroom she made to stand up and started to have dizziness.  She has had similar episodes in the past she has been evaluated for vertigo extensively because at the time she also had hearing loss in the right ear and for the most part this came up negative.  She fell and lost consciousness within the bathtub and the toilet bowl when she came to she was on the floor she was able to wiggle her way out and she remained sore on the right side of the rib pain to some extent on the left side.  She did not pursue medical care at that time but progressively the pain got worse even with deep breathing and hence the reason for presentation. Nondisplaced anterior rib fractures on the right.      CT Chest : Nondisplaced anterior (R) 5-7 rib fractures     Past Medical History:  has a past medical history of GERD (gastroesophageal reflux disease), Hyperlipidemia, and Hypertension.  Past Surgical History:  has a past surgical history that includes Hysterectomy and Breast surgery (Right).    Discharge Recommendations: Nimisha Joshi scored a 20/24 on the AM-PAC ADL Inpatient form. Current research shows that an AM-PAC score of 18 or greater is typically associated with a discharge to the patient's home setting. Based on the patient's AM-PAC score, and their current ADL deficits, it is recommended that the patient have 2-3 sessions per week of Occupational Therapy at d/c to increase the 
  [facility] - Inpatient Rehabilitation Department   Phone: [phone]    Occupational Therapy    [] Initial Evaluation            [x] Daily Treatment Note         [] Discharge Summary      Patient: Nimisha Joshi \"Nimisha Mcgarry\"  : 1945   MRN: [id number]   Date of Service:  2024    Admitting Diagnosis:  Syncope and collapse  Current Admission Summary: Nimisha Joshi is a 78 y.o. female with pmh of hypertension, paroxysmal atrial fibrillation on Eliquis, chronic pain syndrome on oxycodone's who was well until this evening when after going to the bathroom she made to stand up and started to have dizziness.  She has had similar episodes in the past she has been evaluated for vertigo extensively because at the time she also had hearing loss in the right ear and for the most part this came up negative.  She fell and lost consciousness within the bathtub and the toilet bowl when she came to she was on the floor she was able to wiggle her way out and she remained sore on the right side of the rib pain to some extent on the left side.  She did not pursue medical care at that time but progressively the pain got worse even with deep breathing and hence the reason for presentation. Nondisplaced anterior rib fractures on the right.      CT Chest : Nondisplaced anterior (R) 5-7 rib fractures     Past Medical History:  has a past medical history of GERD (gastroesophageal reflux disease), Hyperlipidemia, and Hypertension.  Past Surgical History:  has a past surgical history that includes Hysterectomy and Breast surgery (Right).    Discharge Recommendations: Nimisha Joshi scored a 18/24 on the AM-PAC ADL Inpatient form. Current research shows that an AM-PAC score of 18 or greater is typically associated with a discharge to the patient's home setting. Based on the patient's AM-PAC score, and their current ADL deficits, it is recommended that the patient have 2-3 sessions per week of Occupational Therapy at d/c to 
  Physician Progress Note      PATIENT:               SANA BLACK  CSN #:                  [id number]  :                       1945  ADMIT DATE:       2024 10:04 PM  DISCH DATE:  RESPONDING  PROVIDER #:        Keli Quigley MD          QUERY TEXT:    Pt admitted with orthostatic hypotension due to dehydration and rib fractures.    On arrival BUN 31, creatinine 1.2, GFR 47  and repeat labs on  show BUN   14, creatinine 0.6, GFR >60.  If possible, please document in the progress   notes and discharge summary if you are evaluating and/or treating any of the   following:    The medical record reflects the following:  Risk Factors: orthostatic hypotension, dehydration  Clinical Indicators: On arrival BUN 31, creatinine 1.2, GFR 47  and repeat   labs on  show BUN 14, creatinine 0.6, GFR >60  Treatment:  Iv fluids, monitoring renal labs    Defined by Kidney Disease Improving Global Outcomes (KDIGO) clinical practice   guideline for acute kidney injury:  -Increase in SCr by greater than or equal to 0.3 mg/dl within 48 hours; or  -Increase or decrease in SCr to greater than or equal to 1.5 times baseline,   which is known or presumed to have occurred within the prior 7 days; or  -Urine volume < 0.5ml/kg/h for 6 hours  Options provided:  -- Acute kidney failure  -- Acute kidney injury  -- Other - I will add my own diagnosis  -- Disagree - Not applicable / Not valid  -- Disagree - Clinically unable to determine / Unknown  -- Refer to Clinical Documentation Reviewer    PROVIDER RESPONSE TEXT:    This patient has an Acute kidney injury.    Query created by: Sana Patrick on 2024 10:27 AM      Electronically signed by:  Keli Quigley MD 2024 12:51 PM          
-Patient ambulated to the bathroom with walker, voided, tolerated well.  -PM assessment complete, vitals stable, medications given per MAR  
Admission to the floor completed. A&OX4, family at bed side, c/o pain over her chest while inspiration, lungs sound diminished,chest expansion symmetrical, TELE in place, o2 @ room air above 94%, pain managed with prn Dilaudid, patient is resting in her bed comfortable, all safety precaution applied.   Anne Sanford RN    
Assessment complete. VSS. Lung sounds clear/diminished. Patient rates rib pain 8/10, robaxin given per patient request. Assisted patient up to Bedside commode, patient voided. Patient to go down to echo in bed, patient agreeable. POC discussed with patient and agreed upon mutually. Call light within reach. Bed alarm on. Will continue to monitor  Ambreen Alvarez RN    
CLINICAL PHARMACY NOTE: MEDS TO BEDS    Total # of Prescriptions Filled: 2   The following medications were delivered to the patient:  LIDOCAINE PATCHES   ARTHRITIS CHRISTENSEN RELIEVER GEL    Additional Documentation:  Patient picked up in outpatient pharmacy = signed  Linda Crawford  Pharmacy Tech.   
Patient feels that she cannot go home tomorrow because she does not have anyone to take care of her when she goes home . There will be someone there with her on Wednesday.    
Pt aox4, BP elevated otherwise VSS, medications given per MAR. Pt c/o pain in rib when she moves and breaths deeply. Pt unable to urinate when called RN for assistance, c/o severe discomfort that woke pt from sleep, bladder scan 688, straight cath order obtained and intermittent cath 750ml clear, yellow urine. The care plan and education has been reviewed and mutually agreed upon with the patient. Call light within reach, no other needs at this time.  
Shift assessment complete, /76, pt A&Ox4 in bed, up and voiding. C/o pain to right ribs and back 7/10. Scheduled and prn med given per MAR, POC and education reviewed with the pt. Safety measures in place. All needs met at this time, call light in reach, will continue to monitor.     1420: Discharge instructions given, all questions answered.    1425: pt discharged via w/c, friend at side.   
Shift assessment complete, VSS on RA, pt A&Ox4 in bed. Pt c/o chest pain when she moves and during inspiration 9/10. Pt is laying flat/supine in the bed. Scheduled and prn med given per MAR, POC and education reviewed with the pt. Fall precautions in place. All needs met at this time, call light in reach, will continue to monitor.     1125: Attempted to get orthostatic BP but pt refused at this time d/t lot of pain.     1400: Neurology consulted for Vertigo.     1520: Report given to Ying, all questions answered.   
Shift assessment complete, alert and oriented x4, VSS, up to bedside commode, voiding, Tramadol for pain, all night meds administered. Patient On Eliquis, preferred to be D/C on Wednesday. All safety precautions in place.  The care plan and education has been reviewed and mutually agreed upon with the patient.     
    General appearance: No apparent distress, appears stated age and cooperative.  HEENT: Pupils equal, round, and reactive to light. Conjunctivae/corneas clear.  Neck: Supple, with full range of motion. No jugular venous distention. Trachea midline.  Respiratory:  Normal respiratory effort. Clear to auscultation, bilaterally without RALES/WHEEZES/Rhonchi.  Tenderness to palpation on both sides of the chest on superficial palpation  Cardiovascular: Regular rate and rhythm with normal S1/S2 without MURMURS, rubs or gallops.  Abdomen: Soft, non-tender, non-distended with normal bowel sounds.  Musculoskeletal: No clubbing, cyanosis or EDEMA bilaterally.  Full range of motion without deformity.  Skin: Skin color, texture, turgor normal.  No rashes or lesions.  Neurologic: Cranial nerves: II-XII intact, grossly non-focal.      Labs:   Recent Labs     02/24/24 2313 02/26/24  0519   WBC 9.9 7.7   HGB 9.5* 8.5*   HCT 30.4* 26.6*    245       Recent Labs     02/24/24 2313 02/26/24  0519    140   K 4.2 4.0    113*   CO2 17* 17*   BUN 31* 14   CREATININE 1.2 0.6   CALCIUM 9.2 8.7       No results for input(s): \"AST\", \"ALT\", \"BILIDIR\", \"BILITOT\", \"ALKPHOS\" in the last 72 hours.  No results for input(s): \"INR\" in the last 72 hours.  No results for input(s): \"CKTOTAL\", \"TROPONINI\" in the last 72 hours.    Urinalysis:      Lab Results   Component Value Date/Time    NITRU Negative 02/25/2024 05:30 PM    BLOODU Negative 02/25/2024 05:30 PM    SPECGRAV 1.020 02/25/2024 05:30 PM    GLUCOSEU Negative 02/25/2024 05:30 PM       Radiology:  VL DUP CAROTID BILATERAL         CT CHEST WO CONTRAST   Final Result   Nondisplaced anterior rib fractures on the right.  No pneumothorax         CT HEAD WO CONTRAST   Final Result   Mild atrophy and mild chronic microischemic changes scattered in the deep   white matter with no acute abnormality seen.             Active Hospital Problems    Diagnosis Date Noted    Multiple closed 
anxiety  Orientation:    alert and oriented x 4  Command Following:   WFL     Education  Barriers To Learning: hearing and emotional  Patient Education: patient educated on goals, OT role and benefits, plan of care, precautions, ADL adaptive strategies, energy conservation, disease specific education, transfer training, discharge recommendations  Learning Assessment:  patient verbalizes understanding, would benefit from continued reinforcement    Assessment  Activity Tolerance: Fair, self limiting at times. Significantly limited by rib pain during transitions  Impairments Requiring Therapeutic Intervention: decreased functional mobility, decreased ADL status, decreased ROM, decreased strength, decreased safety awareness, decreased cognition, decreased endurance, decreased balance, decreased IADL, increased pain  Prognosis: fair and guarded  Clinical Assessment: Pt presents with the above deficits which are below baseline secondary to hospital admission associated with syncope and collapse, pt with 5-7 rib fractures. Pt typically independent in all areas of occupation and ambulates with a RW vs. Cane within her home environement. Pt currently requiring max A x2 to perform sit <> sit and min A to perform fxl transfers/mobility with RW. Pt significantly limited by complaints of rib pain during transitions and exhibited self limiting behaviors throughout. Pt would continue to benefit from skilled OT services in order to maximize safety in order to return to [facility].    Safety Interventions: patient left in chair, chair alarm in place, call light within reach, gait belt, patient at risk for falls, and nurse notified    Plan  Frequency: 3-5 x/per week  Current Treatment Recommendations: strengthening, ROM, balance training, functional mobility training, transfer training, ADL/self-care training, IADL training, cognitive/perceptual training, pain management, home exercise program, safety education, equipment 
reported she is normally GARRY with mobility prior to this admit and lives alone. She was able to complete bed mobility, gait, and transfers as noted above during today's session, demonstrating increased levels of assistance for all mobility as compared to baseline. Pt demos varying levels of understanding of need for assistance as noted above. Recommend continued acute care skilled PT services with cotx with OT for improvement of current deficits. At present, recommend d/c to SNF for improvement in safe functioning at home.     Safety Interventions: patient left in chair, chair alarm in place, call light within reach, gait belt, patient at risk for falls, and nurse notified    Plan  Frequency: 3-5 x/per week  Current Treatment Recommendations: strengthening, balance training, functional mobility training, transfer training, gait training, stair training, endurance training, neuromuscular re-education, patient/caregiver education, pain management, and safety education    Goals  Patient Goals: Return to home   Short Term Goals:  Time Frame: Prior to discharge:   Patient will complete bed mobility at moderate assistance   Patient will complete transfers at stand by assistance   Patient will ambulate 40 ft with use of rolling walker at stand by assistance  Patient will ascend/descend 2 stairs without use of HR at minimal assistance with LRAD    Above goals reviewed on 2/26/2024.  All goals are ongoing at this time unless indicated above.      Therapy Session Time      Individual Group Co-treatment   Time In     1043   Time Out     1151   Minutes     68     Timed Code Treatment Minutes:  53 Minutes  Total Treatment Minutes:  68 Minutes     Electronically Signed By: Fabian WONG    This evaluation/treatment was observed and supervised by Regina Ovalle, PT [id number]

## 2024-02-28 NOTE — CARE COORDINATION
[facility] Received home care referral. Will follow.     
Case Management Assessment  Initial Evaluation    Date/Time of Evaluation: 2/26/2024 8:25 AM  Assessment Completed by: Ayush Barrera Jr, RN    If patient is discharged prior to next notation, then this note serves as note for discharge by case management.    Patient Name: Nimisha Joshi                   YOB: 1945  Diagnosis: Syncope and collapse [R55]  Elevated troponin [R79.89]  Closed fracture of multiple ribs of right side, initial encounter [S22.41XA]                   Date / Time: 2/24/2024 10:04 PM    Patient Admission Status: Inpatient   Readmission Risk (Low < 19, Mod (19-27), High > 27): No data recorded  Current PCP: Giulia Hunt, DO  PCP verified by CM? (P) Yes    Chart Reviewed: Yes      History Provided by: (P) Patient  Patient Orientation: (P) Alert and Oriented    Patient Cognition: (P) Alert    Hospitalization in the last 30 days (Readmission):  No    If yes, Readmission Assessment in CM Navigator will be completed.    Advance Directives:      Code Status: Full Code   Patient's Primary Decision Maker is: (P) Legal Next of Kin      Discharge Planning:    Patient lives with: (P) Alone Type of Home: (P) House (ranch home)  Primary Care Giver: (P) Self  Patient Support Systems include: (P) Friends/Neighbors   Current Financial resources: (P) Medicare  Current community resources: (P) None  Current services prior to admission: (P) Home Care            Current DME:              Type of Home Care services:  (P) None    ADLS  Prior functional level: (P) Independent in ADLs/IADLs  Current functional level: (P) Independent in ADLs/IADLs    PT AM-PAC:   /24  OT AM-PAC:   /24    Family can provide assistance at DC: (P) No (none in the area)  Would you like Case Management to discuss the discharge plan with any other family members/significant others, and if so, who? (P) No  Plans to Return to Present Housing: (P) Unknown at present  Other Identified Issues/Barriers to RETURNING to current 
IMM Letter       02/28/24 0904   IMM Letter   IMM Letter given to Patient/Family/Significant other/Guardian/POA/by: Patient   IMM Letter date given: 02/28/24   IMM Letter time given: 0903     PARVIN Miller RN    [facility]  Phone: [phone]    
SW made aware of pt's discharge order.  New PT/OT evaluations completed today.  Patient now only recommended HHC.  Called Mai w/[facility] who accepted patient yesterday and informed of discharge.  Patient was recommended a reacher and sock aide which she can get at any local [email or web address], [employer] or [employer] store.  Case mgmt does not have these items here at the hospital.  No other needs identified at this time.    Discharge Plan:  Home w/[facility].    Electronically signed by TELLY Eisenberg, RICHARD on 2/28/2024 at 3:59 PM      
Agree with the Discharge Plan? Yes    TELLY Eisenberg, LSW  Case Management Department    Electronically signed by TELLY Eisenberg, KAUSHIKW on 2/27/2024 at 2:18 PM

## 2024-02-28 NOTE — PLAN OF CARE
Problem: Discharge Planning  Goal: Discharge to home or other facility with appropriate resources  2/28/2024 1429 by Monico Lee RN  Outcome: Progressing  2/28/2024 1221 by Monico Lee RN  Outcome: Progressing     Problem: Pain  Goal: Verbalizes/displays adequate comfort level or baseline comfort level  2/28/2024 1429 by Monico Lee RN  Outcome: Progressing  2/28/2024 1221 by Monico Lee RN  Outcome: Progressing     Problem: Safety - Adult  Goal: Free from fall injury  2/28/2024 1429 by Monico Lee RN  Outcome: Progressing  2/28/2024 1221 by Monico Lee RN  Outcome: Progressing     Problem: ABCDS Injury Assessment  Goal: Absence of physical injury  2/28/2024 1429 by Monico Lee RN  Outcome: Progressing  2/28/2024 1221 by Monico Lee RN  Outcome: Progressing     Problem: Skin/Tissue Integrity  Goal: Absence of new skin breakdown  Description: 1.  Monitor for areas of redness and/or skin breakdown  2.  Assess vascular access sites hourly  3.  Every 4-6 hours minimum:  Change oxygen saturation probe site  4.  Every 4-6 hours:  If on nasal continuous positive airway pressure, respiratory therapy assess nares and determine need for appliance change or resting period.  2/28/2024 1429 by Monico Lee RN  Outcome: Progressing  2/28/2024 1221 by Monico Lee RN  Outcome: Progressing

## 2024-08-15 ENCOUNTER — OFFICE VISIT (OUTPATIENT)
Dept: ENT CLINIC | Age: 79
End: 2024-08-15
Payer: MEDICARE

## 2024-08-15 VITALS
HEIGHT: 61 IN | SYSTOLIC BLOOD PRESSURE: 102 MMHG | TEMPERATURE: 97.5 F | OXYGEN SATURATION: 97 % | BODY MASS INDEX: 31.53 KG/M2 | DIASTOLIC BLOOD PRESSURE: 67 MMHG | WEIGHT: 167 LBS | HEART RATE: 62 BPM

## 2024-08-15 DIAGNOSIS — R42 DIZZINESS: Primary | ICD-10-CM

## 2024-08-15 DIAGNOSIS — H90.3 SENSORINEURAL HEARING LOSS (SNHL) OF BOTH EARS: ICD-10-CM

## 2024-08-15 PROCEDURE — G8399 PT W/DXA RESULTS DOCUMENT: HCPCS | Performed by: OTOLARYNGOLOGY

## 2024-08-15 PROCEDURE — G8417 CALC BMI ABV UP PARAM F/U: HCPCS | Performed by: OTOLARYNGOLOGY

## 2024-08-15 PROCEDURE — 1036F TOBACCO NON-USER: CPT | Performed by: OTOLARYNGOLOGY

## 2024-08-15 PROCEDURE — 99213 OFFICE O/P EST LOW 20 MIN: CPT | Performed by: OTOLARYNGOLOGY

## 2024-08-15 PROCEDURE — 1123F ACP DISCUSS/DSCN MKR DOCD: CPT | Performed by: OTOLARYNGOLOGY

## 2024-08-15 PROCEDURE — 1090F PRES/ABSN URINE INCON ASSESS: CPT | Performed by: OTOLARYNGOLOGY

## 2024-08-15 PROCEDURE — G8428 CUR MEDS NOT DOCUMENT: HCPCS | Performed by: OTOLARYNGOLOGY

## 2024-08-15 RX ORDER — ALBUTEROL SULFATE 90 UG/1
2 AEROSOL, METERED RESPIRATORY (INHALATION) EVERY 6 HOURS PRN
COMMUNITY
Start: 2018-07-30

## 2024-08-15 RX ORDER — MECLIZINE HCL 12.5 MG/1
TABLET ORAL
COMMUNITY
Start: 2020-05-18

## 2024-08-15 RX ORDER — OMEPRAZOLE 20 MG/1
CAPSULE, DELAYED RELEASE ORAL
COMMUNITY
Start: 2020-04-13

## 2024-08-15 NOTE — PROGRESS NOTES
CHIEF COMPLAINT  Chief Complaint   Patient presents with    Dizziness       HISTORY OF PRESENT ILLNESS    Nimisha Joshi is a 78 y.o. female who presented today for evaluation and management for dizziness.  Dizziness started again first of June and none for 2-3 years prior.  Same dizziness but I don't hear the noises in my ears.  Seems like it's getting better and not last week at all.  Doesn't last all day like it used to, morning or afternoon and always gone by evening.  Does not remember ever taking dyazide.      REVIEW OF SYSTEMS  Constitutional:  Denied fever and chills.  ENT/sinus:  Denied otalgia, otorrhea, nasal pain, rhinorrhea, sore throat, and sinus/facial pain.        EXAMINATION    Vitals:    08/15/24 1302   BP: 102/67   Site: Left Upper Arm   Position: Sitting   Cuff Size: Large Adult   Pulse: 62   Temp: 97.5 °F (36.4 °C)   TempSrc: Infrared   SpO2: 97%   Weight: 75.8 kg (167 lb)   Height: 1.549 m (5' 0.98\")       WDWN, NAD  Face:  Normal skin.    Voice:  Normal, with no hoarseness, breathiness, or hot potato quality.  (+) Ears:  Cerumen impaction occluding the left EAC and partially occluding the right EAC, removed with wire loop under otomicroscope.  TMs and EACs were otherwise normal, with normal pneumatic mobility.  The mastoids and pinnae were normal.  Binaural binocular otomicroscopy was performed.    Nose:  Normal.    Sinuses: Nontender x 4   Throat,  OC/OP:  Normal.    Neck:  NT, No masses.  Trachea midline.    Nodes:  No lymphadenopathy.     Thyroid:  Normal     Neurologic:    CN 2-12 intact.    F-N, CARLOS intact.    Romberg:   could not do, wobbly and off balance.  Hallpikes negative    I performed this physical exam personally.        IMPRESSION / DIAGNOSES / ORDERS:   Nimisha \"Nimisha Orozco" was seen today for dizziness.    Diagnoses and all orders for this visit:    Dizziness  -     [facility]  -     [facility] - VNG Testing, [facility]    Sensorineural hearing loss (SNHL) of both

## 2024-09-06 ENCOUNTER — PROCEDURE VISIT (OUTPATIENT)
Dept: AUDIOLOGY | Age: 79
End: 2024-09-06
Payer: MEDICARE

## 2024-09-06 DIAGNOSIS — R42 DIZZINESS AND GIDDINESS: Primary | ICD-10-CM

## 2024-09-06 DIAGNOSIS — R42 DIZZINESS AND GIDDINESS: ICD-10-CM

## 2024-09-06 DIAGNOSIS — H93.11 TINNITUS OF RIGHT EAR: ICD-10-CM

## 2024-09-06 DIAGNOSIS — H90.3 SENSORINEURAL HEARING LOSS (SNHL) OF BOTH EARS: Primary | ICD-10-CM

## 2024-09-06 PROCEDURE — 92540 BASIC VESTIBULAR EVALUATION: CPT | Performed by: AUDIOLOGIST

## 2024-09-06 PROCEDURE — 92557 COMPREHENSIVE HEARING TEST: CPT | Performed by: AUDIOLOGIST

## 2024-09-06 PROCEDURE — 92567 TYMPANOMETRY: CPT | Performed by: AUDIOLOGIST

## 2024-09-06 NOTE — PROGRESS NOTES
Nimisha Joshi  1945, 78 y.o. female   [id number]     Referring Provider: Bishop Disla MD  Referral Type: In an order in Epic    Reason for Visit: Evaluation of suspected change in hearing, tinnitus, or balance.        VESTIBULAR EVALUATION - VIDEONYSTAGMOGRAPHY (VNG)      Nimisha Joshi was seen today, 9/6/2024, for videonystagmography (VNG) evaluation.  The VNG is an examination of the central vestibulo-ocular pathway that is measured via eye movements.    IMPRESSIONS:      Abnormal VNG.  Celia Hallpike testing elicited nystagmus in both left ear down and right ear down conditions.  This is consistent with VNG findings completed in 2020, however, patient has never been seen by vestibular physical therapy.    Additionally, Smooth pursuit and saccades were abnormal, so there may be a possible central contributor to her symptoms, however, these findings should be viewed with caution in light of patient's age.  Gaze testing was within normal limits, however, low-velocity horizontal and vertical nystagmus was observed in multiple positions without fixation; this is more consistent with a possible central contributor to symptoms.    VNG completed in 2020 did not reveal a significant unilateral weakness or directional preponderance; caloric irrigations were not performed today.    Recommended patient see physical therapy given positive Celia Hallpikes today; consider further balance evaluation/possible caloric irrigations if symptoms do not improve.     VESTIBULAR/AUDIOLOGIC AND PERTINENT MEDICAL HISTORY:      Chief Complaint/Description of Symptoms:   Description: off balanced, wobbly, sometimes a spinning;   Onset: May/June 2024, had previous dizziness like this in Spring 2020 (see previous ENT notes for details);   Duration: 30 minutes, can linger with a wobbly sensation for a couple hours;   Frequency: every day;   Symptoms unchanged since onset.    Symptoms alleviated by: Meclizine, sitting/laying still; Symptoms made

## 2024-09-11 ENCOUNTER — OFFICE VISIT (OUTPATIENT)
Dept: ENT CLINIC | Age: 79
End: 2024-09-11

## 2024-09-11 VITALS
OXYGEN SATURATION: 97 % | HEART RATE: 68 BPM | DIASTOLIC BLOOD PRESSURE: 68 MMHG | BODY MASS INDEX: 30.78 KG/M2 | SYSTOLIC BLOOD PRESSURE: 114 MMHG | HEIGHT: 61 IN | TEMPERATURE: 98.2 F | WEIGHT: 163 LBS

## 2024-09-11 DIAGNOSIS — R42 DIZZINESS: ICD-10-CM

## 2024-09-11 DIAGNOSIS — H90.3 SENSORINEURAL HEARING LOSS (SNHL) OF BOTH EARS: ICD-10-CM

## 2024-09-11 DIAGNOSIS — H81.13 BPPV (BENIGN PAROXYSMAL POSITIONAL VERTIGO), BILATERAL: Primary | ICD-10-CM

## 2024-09-11 DIAGNOSIS — R26.89 BALANCE PROBLEM: ICD-10-CM

## 2024-09-19 ENCOUNTER — HOSPITAL ENCOUNTER (OUTPATIENT)
Dept: PHYSICAL THERAPY | Age: 79
Setting detail: THERAPIES SERIES
Discharge: HOME | End: 2024-09-19
Attending: OTOLARYNGOLOGY
Payer: MEDICARE

## 2024-09-19 DIAGNOSIS — H81.12 BPPV (BENIGN PAROXYSMAL POSITIONAL VERTIGO), LEFT: Primary | ICD-10-CM

## 2024-09-19 DIAGNOSIS — R42 DIZZINESS: ICD-10-CM

## 2024-09-19 DIAGNOSIS — R26.89 IMBALANCE: ICD-10-CM

## 2024-09-19 PROCEDURE — 97161 PT EVAL LOW COMPLEX 20 MIN: CPT

## 2024-09-19 PROCEDURE — 97530 THERAPEUTIC ACTIVITIES: CPT

## 2024-09-19 PROCEDURE — 97112 NEUROMUSCULAR REEDUCATION: CPT

## 2024-09-27 ENCOUNTER — HOSPITAL ENCOUNTER (OUTPATIENT)
Dept: PHYSICAL THERAPY | Age: 79
Setting detail: THERAPIES SERIES
Discharge: HOME | End: 2024-09-27
Attending: OTOLARYNGOLOGY
Payer: MEDICARE

## 2024-09-27 PROCEDURE — 97112 NEUROMUSCULAR REEDUCATION: CPT

## 2024-09-27 PROCEDURE — 95992 CANALITH REPOSITIONING PROC: CPT

## 2024-09-27 PROCEDURE — 97530 THERAPEUTIC ACTIVITIES: CPT

## 2024-09-30 ENCOUNTER — HOSPITAL ENCOUNTER (OUTPATIENT)
Dept: PHYSICAL THERAPY | Age: 79
Setting detail: THERAPIES SERIES
Discharge: HOME | End: 2024-09-30
Attending: OTOLARYNGOLOGY
Payer: MEDICARE

## 2024-09-30 PROCEDURE — 97112 NEUROMUSCULAR REEDUCATION: CPT

## 2024-09-30 PROCEDURE — 95992 CANALITH REPOSITIONING PROC: CPT

## 2024-09-30 NOTE — FLOWSHEET NOTE
neuromuscular reeducation of movement, balance, coordination, kinesthetic sense, posture, and/or proprioception for sitting and/or standing activities  (09574) CANALITH REPOSITIONING TECHNIQUE - Provided a non-invasive treatment that involved moving the patients head and/or body into specific positions to relieve BPPV or dizziness with the changes in head and/or body positions.      GOALS     Patient stated goal: To improve dizziness and imbalance   Status:  [] Progressing: [] Met: [] Not Met: [] Adjusted    Therapist goals for Patient:   Short Term Goals: To be achieved in: 2 weeks  Independent in HEP and progression per patient tolerance, in order to progress toward full function.    Status: [] Progressing: [] Met: [] Not Met: [] Adjusted  Patient's subjective complaint of dizziness/imbalance/symptoms to decrease by 50% to tolerate functional activities.   Status: [] Progressing: [] Met: [] Not Met: [] Adjusted    Long Term Goals: To be achieved in: 6 weeks  DHI score of 15 or less to assist with return to prior level of function.    Status: [] Progressing: [] Met: [] Not Met: [] Adjusted  Patient will return to bending forward and looking up without increased symptoms or restriction to work towards return to prior level of function.        Status: [] Progressing: [] Met: [] Not Met: [] Adjusted  Patient will perform normal ADLs without symptoms 70% of the time.            Status: [] Progressing: [] Met: [] Not Met: [] Adjusted  Patient will perform normal household chores without symptoms 75% of the time.            Status: [] Progressing: [] Met: [] Not Met: [] Adjusted  Patient will improve TUG score to 15 in order to reduce fall risk.           Status: [] Progressing: [] Met: [] Not Met: [] Adjusted    Overall Progression Towards Functional goals/ Treatment Progress Update:  [] Patient is progressing as expected towards functional goals listed.    [] Progression is slowed due to complexities/Impairments

## 2024-10-02 ENCOUNTER — HOSPITAL ENCOUNTER (OUTPATIENT)
Dept: PHYSICAL THERAPY | Age: 79
Setting detail: THERAPIES SERIES
Discharge: HOME | End: 2024-10-02
Attending: OTOLARYNGOLOGY
Payer: MEDICARE

## 2024-10-02 PROCEDURE — 97112 NEUROMUSCULAR REEDUCATION: CPT

## 2024-10-02 NOTE — FLOWSHEET NOTE
[facility] - Outpatient Rehabilitation and Therapy [address], [place] [address] office: [phone] fax: [phone]         Physical Therapy: TREATMENT/PROGRESS NOTE   Patient: Nimisha Joshi (78 y.o. female)   Examination Date: 10/02/2024   :  1945 MRN: [id number]   Visit #: 4   Insurance Allowable Auth Needed   12 visits approved   -2024 [x]Yes    []No    Insurance: Payor: [employer] MEDICARE / Plan: [employer] CHOICE-PPO MEDICARE / Product Type: *No Product type* /   Insurance ID: [id number] - (Medicare Managed)  Secondary Insurance (if applicable):    Treatment Diagnosis:     ICD-10-CM    1. BPPV (benign paroxysmal positional vertigo), left  H81.12       2. Imbalance  R26.89       3. Dizziness  R42          Medical Diagnosis:  BPPV (benign paroxysmal positional vertigo), bilateral [H81.13]  Balance problem [R26.89]   Referring Physician: Bishop Disla MD  PCP: Giulia Hunt DO       Plan of care signed (Y/N):     Date of Patient follow up with Physician:      Plan of Care Report: NO  POC update due: (10 visits /OR AUTH LIMITS, whichever is less)  10/18/2024                                             Medical History:  Comorbidities:  Hypertension  Other: Hyperlipidemia   Relevant Medical History: Chronic low back, Fracture ribs on Left                                         Precautions/ Contra-indications:           Latex allergy:  NO  Pacemaker:    NO  Contraindications for Manipulation: None  Date of Surgery: NA  Other:    Red Flags:  None    Suicide Screening:   The patient did not verbalize a primary behavioral concern, suicidal ideation, suicidal intent, or demonstrate suicidal behaviors.    Preferred Language for Healthcare:   [x] English       [] other:    SUBJECTIVE EXAMINATION     Patient stated complaint/comment:  Pt reports that she is no longer having room spinning dizziness but her off balance sensation is bad today. Dizziness has been bad, took a

## 2024-10-07 ENCOUNTER — HOSPITAL ENCOUNTER (OUTPATIENT)
Dept: PHYSICAL THERAPY | Age: 79
Setting detail: THERAPIES SERIES
Discharge: HOME | End: 2024-10-07
Attending: OTOLARYNGOLOGY
Payer: MEDICARE

## 2024-10-07 PROCEDURE — 97112 NEUROMUSCULAR REEDUCATION: CPT

## 2024-10-07 PROCEDURE — 97530 THERAPEUTIC ACTIVITIES: CPT

## 2024-10-07 NOTE — FLOWSHEET NOTE
documentation efficiency.    Note: If patient does not return for scheduled/recommended follow up visits, this note will serve as a discharge from care along with the most recent update on progress.    Vestibular Evaluation

## 2024-10-09 ENCOUNTER — HOSPITAL ENCOUNTER (OUTPATIENT)
Dept: PHYSICAL THERAPY | Age: 79
Setting detail: THERAPIES SERIES
Discharge: HOME | End: 2024-10-09
Attending: OTOLARYNGOLOGY
Payer: MEDICARE

## 2024-10-09 PROCEDURE — 97110 THERAPEUTIC EXERCISES: CPT

## 2024-10-09 PROCEDURE — 95992 CANALITH REPOSITIONING PROC: CPT

## 2024-10-09 PROCEDURE — 97112 NEUROMUSCULAR REEDUCATION: CPT

## 2024-10-09 NOTE — FLOWSHEET NOTE
Notes/Cues/Progressions   UBE      Nustep   5'  10/9                        NMR re-education (07161)       Balance:  Romberg  Airex   VOR  V/H   Head nods/turns EC   Turns/ Twist   Quarter turns                     SBA   Body sways:   Lateral  A/P     10  10 10/9   Rhomberg:  HT  HN  EC   10/7  1 min    Horz mild light headedness  10/9 horiz  -dizziness    RHomberg on foam:  HT  HN  EC   10/7 no dizziness   Step up on Airex     10/7                        Toe taps 9/30   STAS SOP -assessed 9/27 9/27 Billed as TA     Celia hallpike: Negative B         Therapeutic Activity (01080)       Education on peripheral vestibular vs centralize vestibular disorders, vs sinuitis                     Manual Intervention (29789) Time:                 Canalith Repositioning Technique (64544)   Horizontal hybrid L  for subjective dizziness . No nystagmus    R roll test -Celia lemus pike - negative        Modalities:    No modalities applied this session    Education/Home Exercise Program: HEP instruction not indicated at this time Will issue next patient visit      ASSESSMENT     Today's Assessment:  The patient felt really week today throughout session.  VOR performed seated with minimal dizziness mainly with horizontal head movement. Sways with eyes closed caused minimal   dizziness but was able to recover quickly. Pt to benefit from PT to maximize function. Pt is scheduled to see PCP to consult about sinu sinuitis .     Medical Necessity Documentation:  I certify that this patient meets the below criteria necessary for medical necessity for care and/or justification of therapy services:  The patient has functional impairments and/or activity limitations and would benefit from continued outpatient therapy services to address the deficits outlined in the patients goals    Prognosis/Rehab Potential: Excellent    Patient requires continued skilled intervention: [x] Yes  [] No      CHARGE CAPTURE     PT CHARGE GRID   CPT Code (TIMED)

## 2024-10-14 ENCOUNTER — TELEPHONE (OUTPATIENT)
Dept: ENT CLINIC | Age: 79
End: 2024-10-14

## 2024-10-14 NOTE — TELEPHONE ENCOUNTER
Pt called and stated she is not feeling any help with the vestibular therapy -- she said her dizziness is actually getting worse. Please advise. Thank you.

## 2024-10-14 NOTE — TELEPHONE ENCOUNTER
Please advise patient that my oinion is that she needs to finish out the vestibular physical therapy/rehab and then return for a follow up visit.  Often the benefit from physical therapy comes in the latter part of the course of treatwent.  I can give her a prescription for low dose Xanax if the feels the need for medication to control the dizziness.  No other treatment is indicated at this time.

## 2024-10-15 ENCOUNTER — APPOINTMENT (OUTPATIENT)
Dept: PHYSICAL THERAPY | Age: 79
End: 2024-10-15
Attending: OTOLARYNGOLOGY
Payer: MEDICARE

## 2024-10-18 ENCOUNTER — HOSPITAL ENCOUNTER (OUTPATIENT)
Dept: PHYSICAL THERAPY | Age: 79
Setting detail: THERAPIES SERIES
Discharge: HOME | End: 2024-10-18
Attending: OTOLARYNGOLOGY
Payer: MEDICARE

## 2024-10-18 PROCEDURE — 97112 NEUROMUSCULAR REEDUCATION: CPT

## 2024-10-18 NOTE — FLOWSHEET NOTE
Status: [x] Progressing: [] Met: [] Not Met: [] Adjusted  Patient's subjective complaint of dizziness/imbalance/symptoms to decrease by 50% to tolerate functional activities.   Status: [] Progressing: [] Met: [x] Not Met: [] Adjusted    Long Term Goals: To be achieved in: 6 weeks  DHI score of 15 or less to assist with return to prior level of function.    Status: [x] Progressing: [] Met: [] Not Met: [] Adjusted  Patient will return to bending forward and looking up without increased symptoms or restriction to work towards return to prior level of function.        Status: [x] Progressing: [] Met: [] Not Met: [] Adjusted  Patient will perform normal ADLs without symptoms 70% of the time.            Status: [x] Progressing: [] Met: [] Not Met: [] Adjusted  Patient will perform normal household chores without symptoms 75% of the time.            Status: [x] Progressing: [] Met: [] Not Met: [] Adjusted  Patient will improve TUG score to 15 in order to reduce fall risk.           Status: [] Progressing: [x] Met: [] Not Met: [] Adjusted    Overall Progression Towards Functional goals/ Treatment Progress Update:  [x] Patient is progressing as expected towards functional goals listed.    [] Progression is slowed due to complexities/Impairments listed.  [] Progression has been slowed due to co-morbidities.  [] Plan just implemented, too soon (<30days) to assess goals progression   [] Goals require adjustment due to lack of progress  [] Patient is not progressing as expected and requires additional follow up with physician  [] Other:     TREATMENT PLAN     Frequency/Duration: 2x/week for 6 weeks for the following treatment interventions:    Interventions:  Therapeutic Exercise (59839) including: strength training, ROM, and functional mobility  Therapeutic Activities (69235) including: functional mobility training and education.  Neuromuscular Re-education (52692) activation and proprioception, including postural

## 2024-10-23 ENCOUNTER — HOSPITAL ENCOUNTER (OUTPATIENT)
Dept: PHYSICAL THERAPY | Age: 79
Setting detail: THERAPIES SERIES
Discharge: HOME | End: 2024-10-23
Attending: OTOLARYNGOLOGY
Payer: MEDICARE

## 2024-10-23 PROCEDURE — 97110 THERAPEUTIC EXERCISES: CPT

## 2024-10-23 PROCEDURE — 97112 NEUROMUSCULAR REEDUCATION: CPT

## 2024-10-23 NOTE — FLOWSHEET NOTE
Resistance Sets/time Reps Notes/Cues/Progressions   UBE      Nustep   6'  10/9   LAQ  Seated hip abd  1 5 ea 10/23 HEP                 NMR re-education (63067)       Balance:  Romberg  Airex   VOR  V/H   Head nods/turns EC   Turns/ Twist   Quarter turns                     SBA   Body sways:   Lateral  A/P     10  10 10/9   Rhomberg:  HT  HN  EC   10/7  1 min    Horz mild light headedness  10/9 horiz  -dizziness    RHomberg on foam:  HT  HN  EC  30\"  30\"  2 x 30\"  10/7 no dizziness, 10/23 3/10 \"imbalance\" CGA   Step up on Airex   2 6 ea 10/7, 10/23 UE A / CGA   Stagger stance on airex  30\"  10/23 CGA   Education on nutrition/energy level throughout the day, discussed using a rolator but patient is hesitant, how chronic back pain can decrease balance and function  15'  10/23   Performance testing  X20 min  10/18   Discussed progress and discussed utilizing Rolator to improve community participation, also discussed fear and possible solutions.  X25 min  10/18                 Toe taps 9/30   STAS SOP -assessed 9/27 9/27 Billed as ELZA magallanes: Negative B         Therapeutic Activity (68083)       Education on peripheral vestibular vs centralize vestibular disorders, vs sinuitis                     Manual Intervention (52704) Time:                 Canalith Repositioning Technique (02519)       Modalities:    No modalities applied this session    Education/Home Exercise Program:  Added 10/23:       - Head nods/turns while sitting  - LAQ 1 x 10  - Seated hip ABD 1 x 10      ASSESSMENT     Today's Assessment: Patient presents with imbalance/dizziness with standing activities today.  Much of session spent educating patient on nutrition, standing/activity tolerance, and all questions answered.  Patient expresses high fear of falling due to past falls and encouraged by PT to continue working on her balance and activity tolerance.  Patient voices hesitancy to utilize a rollator in the community, but will consider it

## 2024-10-25 ENCOUNTER — HOSPITAL ENCOUNTER (OUTPATIENT)
Dept: PHYSICAL THERAPY | Age: 79
Setting detail: THERAPIES SERIES
Discharge: HOME | End: 2024-10-25
Attending: OTOLARYNGOLOGY
Payer: MEDICARE

## 2024-10-25 PROCEDURE — 97112 NEUROMUSCULAR REEDUCATION: CPT

## 2024-10-25 PROCEDURE — 97110 THERAPEUTIC EXERCISES: CPT

## 2024-10-25 PROCEDURE — 97530 THERAPEUTIC ACTIVITIES: CPT

## 2024-10-25 NOTE — FLOWSHEET NOTE
NO   NA   Gaze Holding Nystagmus:  N/A   NA     Smooth Pursuits:     WNL   Saccades:       WNL (2 or less)   Convergence: Normal    VOR (Cancellation):  WNL  VOR (Slow):      WNL  Head Impulse Test/ Head Thrust Test:     Negative    DVA: Not Tested        Fixation Blocked/Frenzel/Infrared:  Not Tested     Head Neck Differentiation Test for Cervicogenic Etiology :  not tested  Subjective C/O Dizziness provoked: NO     Coordination Testing:       Finger to Nose:        NT  Alternating pronation/supination:   NT  Finger/Thumb opposition:  NT  Heel to shin (sitting or supine):      NT  Alternating Toe Tapping:       NT    Postural Stability / VSR: 9/27    Test Position Time Result   1.Normal Stance EO 20s N   2.Normal Stance EC 20s S   3.Staggered Stance EO 20s S   4.Stagger Stance EC 14s F   5.Normal Stance on Foam EO 20s S   6.Normal Stance on Foam EC 19s F   7.Fakuda Stepping test Drifting L    If 6 and 7 are positive, there is a 95% sensitivity for UVL    Balance:  [] WNL      [] NT       [x] Dysfunction noted  Comment:     See above     Gait Testing:   not tested  Level of Assistance needed:  Modified Independent  Gait Deviations (firm surface/linoleum):    deviated path  Assistive Device Used:  rolling walker (RW)    Positional Testing    Nystagmus Direction Duration Vertigo Duration   Right Loaded Celia Hallpike       Left Loaded Celia Hallpike Upbeating torsional  3-5 seconds yes    Right Sidelying Test       Left Sidelying Test       Right Roll Test       Left Roll Test                  10/18:  Postural Stability / VSR:     Test Position Time Result   1.Normal Stance EO 20s N   2.Normal Stance EC 20s N   3.Staggered Stance EO 20s N   4.Stagger Stance EC 20s N   5.Normal Stance on Foam EO 20s N   6.Normal Stance on Foam EC 20s S   7.Fakuda Stepping test 20 s N with fear   If 6 and 7 are positive, there is a 95% sensitivity for UVL    Exercises/Interventions     Therapeutic Ex (75995)  Resistance Sets/time Reps

## 2024-10-28 ENCOUNTER — HOSPITAL ENCOUNTER (OUTPATIENT)
Dept: PHYSICAL THERAPY | Age: 79
Setting detail: THERAPIES SERIES
Discharge: HOME | End: 2024-10-28
Attending: OTOLARYNGOLOGY
Payer: MEDICARE

## 2024-10-28 PROCEDURE — 97530 THERAPEUTIC ACTIVITIES: CPT

## 2024-10-28 PROCEDURE — 97110 THERAPEUTIC EXERCISES: CPT

## 2024-10-28 NOTE — FLOWSHEET NOTE
N/A   NA     Smooth Pursuits:     WNL   Saccades:       WNL (2 or less)   Convergence: Normal    VOR (Cancellation):  WNL  VOR (Slow):      WNL  Head Impulse Test/ Head Thrust Test:     Negative    DVA: Not Tested        Fixation Blocked/Frenzel/Infrared:  Not Tested     Head Neck Differentiation Test for Cervicogenic Etiology :  not tested  Subjective C/O Dizziness provoked: NO     Coordination Testing:       Finger to Nose:        NT  Alternating pronation/supination:   NT  Finger/Thumb opposition:  NT  Heel to shin (sitting or supine):      NT  Alternating Toe Tapping:       NT    Postural Stability / VSR: 9/27    Test Position Time Result   1.Normal Stance EO 20s N   2.Normal Stance EC 20s S   3.Staggered Stance EO 20s S   4.Stagger Stance EC 14s F   5.Normal Stance on Foam EO 20s S   6.Normal Stance on Foam EC 19s F   7.Fakuda Stepping test Drifting L    If 6 and 7 are positive, there is a 95% sensitivity for UVL    Balance:  [] WNL      [] NT       [x] Dysfunction noted  Comment:     See above     Gait Testing:   not tested  Level of Assistance needed:  Modified Independent  Gait Deviations (firm surface/linoleum):    deviated path  Assistive Device Used:  rolling walker (RW)    Positional Testing    Nystagmus Direction Duration Vertigo Duration   Right Loaded [place] Hallpike       Left Loaded Celia Hallpike Upbeating torsional  3-5 seconds yes    Right Sidelying Test       Left Sidelying Test       Right Roll Test       Left Roll Test                  10/18:  Postural Stability / VSR:     Test Position Time Result   1.Normal Stance EO 20s N   2.Normal Stance EC 20s N   3.Staggered Stance EO 20s N   4.Stagger Stance EC 20s N   5.Normal Stance on Foam EO 20s N   6.Normal Stance on Foam EC 20s S   7.Fakuda Stepping test 20 s N with fear   If 6 and 7 are positive, there is a 95% sensitivity for UVL    Exercises/Interventions     Therapeutic Ex (93847)  Resistance Sets/time Reps Notes/Cues/Progressions   UBE

## 2024-10-30 ENCOUNTER — HOSPITAL ENCOUNTER (OUTPATIENT)
Dept: PHYSICAL THERAPY | Age: 79
Setting detail: THERAPIES SERIES
Discharge: HOME | End: 2024-10-30
Attending: OTOLARYNGOLOGY
Payer: MEDICARE

## 2024-10-30 PROCEDURE — 97112 NEUROMUSCULAR REEDUCATION: CPT

## 2024-10-30 PROCEDURE — 97140 MANUAL THERAPY 1/> REGIONS: CPT

## 2024-10-30 PROCEDURE — 97110 THERAPEUTIC EXERCISES: CPT

## 2024-10-30 PROCEDURE — 97530 THERAPEUTIC ACTIVITIES: CPT

## 2024-10-30 NOTE — FLOWSHEET NOTE
[facility] - Outpatient Rehabilitation and Therapy [address], [place] [address] office: [phone] fax: [phone]   Physical Therapy Re-Certification Plan of Care    Dear Bishop Disla MD  ,    We had the pleasure of treating the following patient for physical therapy services at [facility]. A summary of our findings can be found in the updated assessment below.  This includes our plan of care.  If you have any questions or concerns regarding these findings, please do not hesitate to contact me at the office phone number checked above.  Thank you for the referral.     Physician Signature:________________________________Date:__________________  By signing above (or electronic signature), therapist's plan is approved by physician      Functional Outcome: DHI 58 (Eval on 24 DHI 64) - outcome measure does not accurately reflect patient progress    Patient demonstrates improved static and dynamic balance overall, requiring less assistance during balance challenges.  Patient does not experience vertigo symptoms anymore, but JOSE remains unstable at times.  Patient limited by headache with activity today.  Unable to tolerate prolonged standing exercises without increasing dizziness due to waking up with a sinus headache.  Educated on resuming medication from primary care physician for sinus HA and seasonal allergies.  Patient  Patient will continue to benefit from skilled physical therapy to promote safety with ADLs by further increasing dynamic and functional balance.      Total Visits: 11     Recommendation:    [x] Continue PT 2 x / wk for 4 weeks.   [] Hold PT, pending MD visit   [] Discharge to [facility]. Follow up with PT or MD PRN.      Physical Therapy: TREATMENT/PROGRESS NOTE   Patient: Nimisha Joshi (78 y.o. female)   Examination Date: 10/30/2024   :  1945 MRN: [id number]   Visit #:    Insurance Allowable Auth Needed   12 visits approved

## 2024-11-04 ENCOUNTER — HOSPITAL ENCOUNTER (OUTPATIENT)
Dept: PHYSICAL THERAPY | Age: 79
Setting detail: THERAPIES SERIES
Discharge: HOME | End: 2024-11-04
Attending: OTOLARYNGOLOGY
Payer: MEDICARE

## 2024-11-04 PROCEDURE — 97530 THERAPEUTIC ACTIVITIES: CPT

## 2024-11-04 PROCEDURE — 97110 THERAPEUTIC EXERCISES: CPT

## 2024-11-04 PROCEDURE — 97112 NEUROMUSCULAR REEDUCATION: CPT

## 2024-11-04 NOTE — FLOWSHEET NOTE
[facility] - Outpatient Rehabilitation and Therapy [address], [place] [address] office: [phone] fax: [phone]   Recommendation:    [x] Continue PT 2 x / wk for 4 weeks.   [] Hold PT, pending MD visit   [] Discharge to [facility]. Follow up with PT or MD PRN.      Physical Therapy: TREATMENT/PROGRESS NOTE   Patient: Nimisha Joshi (78 y.o. female)   Examination Date: 2024   :  1945 MRN: [id number]   Visit #:    Insurance Allowable Auth Needed   8 visits approved 2024 [x]Yes    []No    Insurance: Payor: HUMANA MEDICARE / Plan: HUMANA CHOICE-PPO MEDICARE / Product Type: *No Product type* /   Insurance ID: [id number] - (Medicare Managed)  Secondary Insurance (if applicable):    Treatment Diagnosis:     ICD-10-CM    1. BPPV (benign paroxysmal positional vertigo), left  H81.12       2. Imbalance  R26.89       3. Dizziness  R42          Medical Diagnosis:  BPPV (benign paroxysmal positional vertigo), bilateral [H81.13]  Balance problem [R26.89]   Referring Physician: Bishop Disla MD  PCP: Giulia Hunt DO       Plan of care signed (Y/N):     Date of Patient follow up with Physician: NABILA     Plan of Care Report: YES, Date Range for this report:  to 10/30/24  POC update due: (10 visits /OR AUTH LIMITS, whichever is less)  2024                                             Medical History:  Comorbidities:  Hypertension  Other: Hyperlipidemia   Relevant Medical History: Chronic low back, Fracture ribs on Left                                         Precautions/ Contra-indications:           Latex allergy:  NO  Pacemaker:    NO  Contraindications for Manipulation: None  Date of Surgery: NA  Other:    Red Flags:  None    Suicide Screening:   The patient did not verbalize a primary behavioral concern, suicidal ideation, suicidal intent, or demonstrate suicidal behaviors.    Preferred Language for Healthcare:   [x] English       [] other:    SUBJECTIVE EXAMINATION

## 2024-11-11 ENCOUNTER — OFFICE VISIT (OUTPATIENT)
Dept: ENT CLINIC | Age: 79
End: 2024-11-11
Payer: MEDICARE

## 2024-11-11 VITALS
HEIGHT: 60 IN | HEART RATE: 79 BPM | DIASTOLIC BLOOD PRESSURE: 60 MMHG | TEMPERATURE: 97 F | SYSTOLIC BLOOD PRESSURE: 93 MMHG | WEIGHT: 166 LBS | OXYGEN SATURATION: 94 % | BODY MASS INDEX: 32.59 KG/M2

## 2024-11-11 DIAGNOSIS — H81.13 BPPV (BENIGN PAROXYSMAL POSITIONAL VERTIGO), BILATERAL: Primary | Chronic | ICD-10-CM

## 2024-11-11 DIAGNOSIS — R51.9 SINUS HEADACHE: Chronic | ICD-10-CM

## 2024-11-11 PROCEDURE — 99214 OFFICE O/P EST MOD 30 MIN: CPT | Performed by: OTOLARYNGOLOGY

## 2024-11-11 PROCEDURE — G8417 CALC BMI ABV UP PARAM F/U: HCPCS | Performed by: OTOLARYNGOLOGY

## 2024-11-11 PROCEDURE — 1159F MED LIST DOCD IN RCRD: CPT | Performed by: OTOLARYNGOLOGY

## 2024-11-11 PROCEDURE — 1036F TOBACCO NON-USER: CPT | Performed by: OTOLARYNGOLOGY

## 2024-11-11 PROCEDURE — 1123F ACP DISCUSS/DSCN MKR DOCD: CPT | Performed by: OTOLARYNGOLOGY

## 2024-11-11 PROCEDURE — 1090F PRES/ABSN URINE INCON ASSESS: CPT | Performed by: OTOLARYNGOLOGY

## 2024-11-11 PROCEDURE — G8427 DOCREV CUR MEDS BY ELIG CLIN: HCPCS | Performed by: OTOLARYNGOLOGY

## 2024-11-11 PROCEDURE — G8484 FLU IMMUNIZE NO ADMIN: HCPCS | Performed by: OTOLARYNGOLOGY

## 2024-11-11 PROCEDURE — G8399 PT W/DXA RESULTS DOCUMENT: HCPCS | Performed by: OTOLARYNGOLOGY

## 2024-11-11 PROCEDURE — 1125F AMNT PAIN NOTED PAIN PRSNT: CPT | Performed by: OTOLARYNGOLOGY

## 2024-11-11 NOTE — PROGRESS NOTES
CHIEF COMPLAINT  Chief Complaint   Patient presents with    Dizziness       HISTORY OF PRESENT ILLNESS     Nimisha Joshi is a 79 y.o. female here for recheck and follow up of the above chief complaint.  Patient stated that she has undergone vestibular rehab physical therapy and that the therapist has requested 6 more sessions.  \"Dizziness has gotten a lot better.\"  Has \"sinus headache\", spring and fall allergies, once a week, lasts for half a day, goes away with tylenol sinus or sudafed.          REVIEW OF SYSTEMS  Constitutional:  Denied fever and chills.  ENT/sinus:  Denied otalgia, otorrhea, nasal pain, rhinorrhea, sore throat, and sinus/facial pain.        EXAMINATION    Vitals:    11/11/24 1505   BP: 93/60   Site: Left Upper Arm   Position: Sitting   Cuff Size: Medium Adult   Pulse: 79   Temp: 97 °F (36.1 °C)   TempSrc: Infrared   SpO2: 94%   Weight: 75.3 kg (166 lb)   Height: 1.524 m (5')     WDWN, NAD  Face:  Normal skin.    Voice:  Normal, with no hoarseness, breathiness, or hot potato quality.  Ears:   TMs and EACs were normal.  The mastoids and pinnae were normal.    Nose:  Normal.    Sinuses: Nontender x 4   Throat,  OC/OP:  Normal.    Neck:  NT, No masses.  Trachea midline.    Nodes:  No lymphadenopathy.     Thyroid:  Normal       I performed this physical exam personally.        IMPRESSION / DIAGNOSES / ORDERS:   Nimisha \"Nimisha Orozco" was seen today for dizziness.    Diagnoses and all orders for this visit:    BPPV (benign paroxysmal positional vertigo), bilateral    Sinus headache  Comments:  vacuum congestion sinus headache         RECOMMENDATIONS / PLAN:   Afrin rhinogenic sinus headache test/treatment.  Prescription drug management:  Continue Meclizine 12.5  Take 1 or 2 tablets po TID prn dizziness.    Continue Loratadine and Flonase nasal spray for allergy management.    Finish with vestibular rehab/PT.  RTO if symptoms worsen.     Return for any other ENT or sinus problems or symptoms.         Patient

## 2024-11-13 ENCOUNTER — HOSPITAL ENCOUNTER (OUTPATIENT)
Dept: PHYSICAL THERAPY | Age: 79
Setting detail: THERAPIES SERIES
Discharge: HOME | End: 2024-11-13
Attending: OTOLARYNGOLOGY
Payer: MEDICARE

## 2024-11-13 PROCEDURE — 97112 NEUROMUSCULAR REEDUCATION: CPT

## 2024-11-13 PROCEDURE — 97110 THERAPEUTIC EXERCISES: CPT

## 2024-11-13 NOTE — FLOWSHEET NOTE
[facility] - Outpatient Rehabilitation and Therapy [address], [place] [address] office: [phone] fax: [phone]   Recommendation:    [x] Continue PT 2 x / wk for 4 weeks.   [] Hold PT, pending MD visit   [] Discharge to [facility]. Follow up with PT or MD PRN.      Physical Therapy: TREATMENT/PROGRESS NOTE   Patient: Nimisha Joshi (78 y.o. female)   Examination Date: 2024   :  1945 MRN: [id number]   Visit #:   Insurance Allowable Auth Needed   8 visits approved 2024 [x]Yes    []No    Insurance: Payor: HUMANA MEDICARE / Plan: HUMANA CHOICE-PPO MEDICARE / Product Type: *No Product type* /   Insurance ID: [id number] - (Medicare Managed)  Secondary Insurance (if applicable):    Treatment Diagnosis:     ICD-10-CM    1. BPPV (benign paroxysmal positional vertigo), left  H81.12       2. Imbalance  R26.89       3. Dizziness  R42          Medical Diagnosis:  BPPV (benign paroxysmal positional vertigo), bilateral [H81.13]  Balance problem [R26.89]   Referring Physician: Bishop Disla MD  PCP: Giulia Hunt DO       Plan of care signed (Y/N):     Date of Patient follow up with Physician: NABILA     Plan of Care Report: YES, Date Range for this report:  to 10/30/24  POC update due: (10 visits /OR AUTH LIMITS, whichever is less)  2024                                             Medical History:  Comorbidities:  Hypertension  Other: Hyperlipidemia   Relevant Medical History: Chronic low back, Fracture ribs on Left                                         Precautions/ Contra-indications:           Latex allergy:  NO  Pacemaker:    NO  Contraindications for Manipulation: None  Date of Surgery: NA  Other:    Red Flags:  None    Suicide Screening:   The patient did not verbalize a primary behavioral concern, suicidal ideation, suicidal intent, or demonstrate suicidal behaviors.    Preferred Language for Healthcare:   [x] English       [] other:    SUBJECTIVE

## 2024-11-15 ENCOUNTER — HOSPITAL ENCOUNTER (OUTPATIENT)
Dept: PHYSICAL THERAPY | Age: 79
Setting detail: THERAPIES SERIES
Discharge: HOME | End: 2024-11-15
Attending: OTOLARYNGOLOGY
Payer: MEDICARE

## 2024-11-15 PROCEDURE — 97110 THERAPEUTIC EXERCISES: CPT

## 2024-11-15 PROCEDURE — 97112 NEUROMUSCULAR REEDUCATION: CPT

## 2024-11-15 PROCEDURE — 97530 THERAPEUTIC ACTIVITIES: CPT

## 2024-11-15 NOTE — FLOWSHEET NOTE
Tissue Mobilization, Trigger Point Release, and Myofascial Release  Modalities as needed that may include: Cryotherapy, Electrical Stimulation, Biofeedback, Thermal Agents, and Ultrasound  Patient education on joint protection, postural re-education, activity modification, progression of HEP, and vestibular fuction/BPPV  CRP for assessment, treatment and education of BPPV    Plan:  Continue endurance/balance/LE resistance training, consider asking about walking program with rolator again.    Sd Tidwell DPT,  OMT-C, VRC   Electronically Signed by Sd Tidwell, PT  Date: 11/15/2024   Therapist was present, directed the patient's care, made skilled judgement, and was responsible for assessment and treatment of the patient.         Note: Portions of this note have been templated and/or copied from initial evaluation, reassessments and prior notes for documentation efficiency.    Note: If patient does not return for scheduled/recommended follow up visits, this note will serve as a discharge from care along with the most recent update on progress.    Vestibular Evaluation

## 2024-11-20 ENCOUNTER — HOSPITAL ENCOUNTER (OUTPATIENT)
Dept: PHYSICAL THERAPY | Age: 79
Setting detail: THERAPIES SERIES
Discharge: HOME | End: 2024-11-20
Attending: OTOLARYNGOLOGY
Payer: MEDICARE

## 2024-11-20 PROCEDURE — 97110 THERAPEUTIC EXERCISES: CPT

## 2024-11-20 PROCEDURE — 97530 THERAPEUTIC ACTIVITIES: CPT

## 2024-11-20 PROCEDURE — 97112 NEUROMUSCULAR REEDUCATION: CPT

## 2024-11-20 NOTE — FLOWSHEET NOTE
[facility] - Outpatient Rehabilitation and Therapy [address], [place] [address] office: [phone] fax: [phone]   Recommendation:    [x] Continue PT 2 x / wk for 4 weeks.   [] Hold PT, pending MD visit   [] Discharge to [facility]. Follow up with PT or MD PRN.      Physical Therapy: TREATMENT/PROGRESS NOTE   Patient: Nimisha Joshi (79 y.o. female)   Examination Date: 2024   :  1945 MRN: [id number]   Visit #: 15 /12   3/8  Insurance Allowable Auth Needed   8 visits approved 2024 [x]Yes    []No    Insurance: Payor: HUMANA MEDICARE / Plan: HUMANA CHOICE-PPO MEDICARE / Product Type: *No Product type* /   Insurance ID: [id number] - (Medicare Managed)  Secondary Insurance (if applicable):    Treatment Diagnosis:     ICD-10-CM    1. BPPV (benign paroxysmal positional vertigo), left  H81.12       2. Imbalance  R26.89       3. Dizziness  R42          Medical Diagnosis:  BPPV (benign paroxysmal positional vertigo), bilateral [H81.13]  Balance problem [R26.89]   Referring Physician: Bishop Disla MD  PCP: Giulia Hunt DO       Plan of care signed (Y/N):     Date of Patient follow up with Physician: NABILA     Plan of Care Report: YES, Date Range for this report:  to 10/30/24  POC update due: (10 visits /OR AUTH LIMITS, whichever is less)  2024                                             Medical History:  Comorbidities:  Hypertension  Other: Hyperlipidemia   Relevant Medical History: Chronic low back, Fracture ribs on Left                                         Precautions/ Contra-indications:           Latex allergy:  NO  Pacemaker:    NO  Contraindications for Manipulation: None  Date of Surgery: NA  Other:    Red Flags:  None    Suicide Screening:   The patient did not verbalize a primary behavioral concern, suicidal ideation, suicidal intent, or demonstrate suicidal behaviors.    Preferred Language for Healthcare:   [x] English       [] other:    SUBJECTIVE

## 2024-11-22 ENCOUNTER — HOSPITAL ENCOUNTER (OUTPATIENT)
Dept: PHYSICAL THERAPY | Age: 79
Setting detail: THERAPIES SERIES
Discharge: HOME | End: 2024-11-22
Attending: OTOLARYNGOLOGY
Payer: MEDICARE

## 2024-11-22 PROCEDURE — 97112 NEUROMUSCULAR REEDUCATION: CPT

## 2024-11-22 PROCEDURE — 97110 THERAPEUTIC EXERCISES: CPT

## 2024-11-22 PROCEDURE — 97530 THERAPEUTIC ACTIVITIES: CPT

## 2024-11-22 NOTE — FLOWSHEET NOTE
dizziness/imbalance/symptoms to decrease by 50% to tolerate functional activities.   Status: [] Progressing: [] Met: [x] Not Met: [] Adjusted    Long Term Goals: To be achieved in: 6 weeks  DHI score of 15 or less to assist with return to prior level of function.    Status: [x] Progressing: [] Met: [] Not Met: [] Adjusted  Patient will return to bending forward and looking up without increased symptoms or restriction to work towards return to prior level of function.        Status: [] Progressing: [x] Met: [] Not Met: [] Adjusted  Patient will perform normal ADLs without symptoms 70% of the time.            Status: [x] Progressing: [] Met: [] Not Met: [] Adjusted  Patient will perform normal household chores without symptoms 75% of the time.            Status: [x] Progressing: [] Met: [] Not Met: [] Adjusted  Patient will improve TUG score to 15 in order to reduce fall risk.           Status: [] Progressing: [x] Met: [] Not Met: [] Adjusted    Overall Progression Towards Functional goals/ Treatment Progress Update:  [x] Patient is progressing as expected towards functional goals listed.    [] Progression is slowed due to complexities/Impairments listed.  [] Progression has been slowed due to co-morbidities.  [] Plan just implemented, too soon (<30days) to assess goals progression   [] Goals require adjustment due to lack of progress  [] Patient is not progressing as expected and requires additional follow up with physician  [] Other:     TREATMENT PLAN     Frequency/Duration: 2x/week for 6 weeks for the following treatment interventions:    Interventions:  Therapeutic Exercise (01978) including: strength training, ROM, and functional mobility  Therapeutic Activities (05499) including: functional mobility training and education.  Neuromuscular Re-education (76675) activation and proprioception, including postural re-education.    Gait Training (89433) for normalization of ambulation patterns and AD training.   Manual

## 2024-11-26 ENCOUNTER — HOSPITAL ENCOUNTER (OUTPATIENT)
Dept: PHYSICAL THERAPY | Age: 79
Setting detail: THERAPIES SERIES
Discharge: HOME | End: 2024-11-26
Attending: OTOLARYNGOLOGY
Payer: MEDICARE

## 2024-11-26 PROCEDURE — 97110 THERAPEUTIC EXERCISES: CPT

## 2024-11-26 NOTE — FLOWSHEET NOTE
activity modification, progression of HEP, and vestibular fuction/BPPV  CRP for assessment, treatment and education of BPPV    Plan:  Continue endurance/balance/LE resistance training, consider asking about walking program with rolator again.    Sd Tidwell, DPT,  OMT-C, VRC   Electronically Signed by Sd Tidwell, PT  Date: 11/26/2024   Therapist was present, directed the patient's care, made skilled judgement, and was responsible for assessment and treatment of the patient.         Note: Portions of this note have been templated and/or copied from initial evaluation, reassessments and prior notes for documentation efficiency.    Note: If patient does not return for scheduled/recommended follow up visits, this note will serve as a discharge from care along with the most recent update on progress.    Vestibular Evaluation

## 2024-11-29 ENCOUNTER — HOSPITAL ENCOUNTER (OUTPATIENT)
Dept: PHYSICAL THERAPY | Age: 79
Setting detail: THERAPIES SERIES
Discharge: HOME | End: 2024-11-29
Attending: OTOLARYNGOLOGY
Payer: MEDICARE

## 2024-11-29 PROCEDURE — 97530 THERAPEUTIC ACTIVITIES: CPT

## 2024-11-29 PROCEDURE — 97110 THERAPEUTIC EXERCISES: CPT

## 2024-11-29 NOTE — DISCHARGE SUMMARY
ambulation patterns and AD training.   Manual Therapy (42030) as indicated to include: Passive Range of Motion, Gr I-IV mobilizations, Soft Tissue Mobilization, Trigger Point Release, and Myofascial Release  Modalities as needed that may include: Cryotherapy, Electrical Stimulation, Biofeedback, Thermal Agents, and Ultrasound  Patient education on joint protection, postural re-education, activity modification, progression of HEP, and vestibular fuction/BPPV  CRP for assessment, treatment and education of BPPV    Plan:  Continue endurance/balance/LE resistance training, consider asking about walking program with rolator again.    Sd Tidwell, DPT,  OMT-C, VRC   Electronically Signed by Sd Tidwell, PT  Date: 11/29/2024   Therapist was present, directed the patient's care, made skilled judgement, and was responsible for assessment and treatment of the patient.         Note: Portions of this note have been templated and/or copied from initial evaluation, reassessments and prior notes for documentation efficiency.    Note: If patient does not return for scheduled/recommended follow up visits, this note will serve as a discharge from care along with the most recent update on progress.    Vestibular Evaluation

## 2025-01-14 ENCOUNTER — HOSPITAL ENCOUNTER (OUTPATIENT)
Dept: GENERAL RADIOLOGY | Age: 80
Discharge: HOME | End: 2025-01-14
Payer: MEDICARE

## 2025-01-14 ENCOUNTER — HOSPITAL ENCOUNTER (OUTPATIENT)
Dept: WOMENS IMAGING | Age: 80
Discharge: HOME | End: 2025-01-14
Payer: MEDICARE

## 2025-01-14 VITALS — BODY MASS INDEX: 32.86 KG/M2 | WEIGHT: 163 LBS | HEIGHT: 59 IN

## 2025-01-14 DIAGNOSIS — M85.80 OTHER SPECIFIED DISORDERS OF BONE DENSITY AND STRUCTURE, UNSPECIFIED SITE: ICD-10-CM

## 2025-01-14 DIAGNOSIS — Z12.31 BREAST CANCER SCREENING BY MAMMOGRAM: ICD-10-CM

## 2025-01-14 DIAGNOSIS — Z78.0 ASYMPTOMATIC POSTMENOPAUSAL STATUS: ICD-10-CM

## 2025-01-14 PROCEDURE — 77063 BREAST TOMOSYNTHESIS BI: CPT

## 2025-01-14 PROCEDURE — 77080 DXA BONE DENSITY AXIAL: CPT

## 2025-02-27 ENCOUNTER — OFFICE VISIT (OUTPATIENT)
Dept: ENT CLINIC | Age: 80
End: 2025-02-27

## 2025-02-27 VITALS
HEART RATE: 73 BPM | TEMPERATURE: 97.3 F | OXYGEN SATURATION: 97 % | BODY MASS INDEX: 33.06 KG/M2 | WEIGHT: 164 LBS | DIASTOLIC BLOOD PRESSURE: 69 MMHG | HEIGHT: 59 IN | SYSTOLIC BLOOD PRESSURE: 109 MMHG

## 2025-02-27 DIAGNOSIS — R42 DIZZINESS: Primary | ICD-10-CM

## 2025-02-27 DIAGNOSIS — R51.9 FREQUENT HEADACHES: ICD-10-CM

## 2025-02-27 DIAGNOSIS — H61.22 IMPACTED CERUMEN OF LEFT EAR: ICD-10-CM

## 2025-02-27 NOTE — PROGRESS NOTES
[facility]  DIVISION OF OTOLARYNGOLOGY- HEAD & NECK SURGERY  CLINIC FOLLOW-UP VISIT      Patient Name: Nimisha Joshi  Medical Record Number:  [id number]  Primary Care Physician:  Giulia Hunt DO    ChiefComplaint     Chief Complaint   Patient presents with    Sinus Problem     Sinus headache, dizziness        History of Present Illness     Nimisha Joshi is an 79 y.o. female previously seen for BPPV, sinus headache.  Last seen by Dr. Disla on 12/1/2024.    Interval History:   Recently since February 1 she has been having sinus headaches daily and dizziness.  She was seen for BPPV in the past, went to physical therapy and her issues resolved.  She had no issues with dizziness or headaches from November to February.  She feels like her dizziness is back but now feels more of an off-balance rather than room spinning.  Starts as soon as she gets up in the morning.  Denies recent head trauma.  Was placed on prednisone and Amoxil which did not help.  Currently was told to start taking Afrin which has not helped either.    Denies mucopurulent nasal drainage.  No nasal congestion.  Sense of smell is intact.  Denies recent hearing changes or tinnitus.      Past Medical History     Past Medical History:   Diagnosis Date    GERD (gastroesophageal reflux disease)     Hyperlipidemia     Hypertension        Past Surgical History     Past Surgical History:   Procedure Laterality Date    BREAST SURGERY Right     right breast, calcification removed    HYSTERECTOMY (CERVIX STATUS UNKNOWN)      1995       Family History     Family History   Problem Relation Age of Onset    High Cholesterol Mother     Glaucoma Mother     Stroke Mother        Social History     Social History     Tobacco Use    Smoking status: Never    Smokeless tobacco: Never   Vaping Use    Vaping status: Never Used   Substance Use Topics    Alcohol use: Not Currently    Drug use: No        Allergies     Allergies   Allergen Reactions    Fentanyl Hallucinations

## 2025-03-03 ENCOUNTER — OFFICE VISIT (OUTPATIENT)
Dept: NEUROLOGY | Age: 80
End: 2025-03-03
Payer: MEDICARE

## 2025-03-03 VITALS
WEIGHT: 164 LBS | SYSTOLIC BLOOD PRESSURE: 123 MMHG | HEART RATE: 73 BPM | DIASTOLIC BLOOD PRESSURE: 73 MMHG | HEIGHT: 59 IN | BODY MASS INDEX: 33.06 KG/M2

## 2025-03-03 DIAGNOSIS — R42 DIZZINESS: Primary | ICD-10-CM

## 2025-03-03 DIAGNOSIS — E53.8 VITAMIN B12 DEFICIENCY DISEASE: ICD-10-CM

## 2025-03-03 DIAGNOSIS — G44.52 NEW PERSISTENT DAILY HEADACHE: ICD-10-CM

## 2025-03-03 DIAGNOSIS — H90.3 BILATERAL SENSORINEURAL HEARING LOSS: ICD-10-CM

## 2025-03-03 DIAGNOSIS — H81.93 VESTIBULAR DISORDERS, BILATERAL: ICD-10-CM

## 2025-03-03 DIAGNOSIS — R27.0 ATAXIA: ICD-10-CM

## 2025-03-03 PROCEDURE — G8399 PT W/DXA RESULTS DOCUMENT: HCPCS | Performed by: PSYCHIATRY & NEUROLOGY

## 2025-03-03 PROCEDURE — 1036F TOBACCO NON-USER: CPT | Performed by: PSYCHIATRY & NEUROLOGY

## 2025-03-03 PROCEDURE — 99204 OFFICE O/P NEW MOD 45 MIN: CPT | Performed by: PSYCHIATRY & NEUROLOGY

## 2025-03-03 PROCEDURE — 1160F RVW MEDS BY RX/DR IN RCRD: CPT | Performed by: PSYCHIATRY & NEUROLOGY

## 2025-03-03 PROCEDURE — 1159F MED LIST DOCD IN RCRD: CPT | Performed by: PSYCHIATRY & NEUROLOGY

## 2025-03-03 PROCEDURE — G8417 CALC BMI ABV UP PARAM F/U: HCPCS | Performed by: PSYCHIATRY & NEUROLOGY

## 2025-03-03 PROCEDURE — 1090F PRES/ABSN URINE INCON ASSESS: CPT | Performed by: PSYCHIATRY & NEUROLOGY

## 2025-03-03 PROCEDURE — G8427 DOCREV CUR MEDS BY ELIG CLIN: HCPCS | Performed by: PSYCHIATRY & NEUROLOGY

## 2025-03-03 PROCEDURE — 1123F ACP DISCUSS/DSCN MKR DOCD: CPT | Performed by: PSYCHIATRY & NEUROLOGY

## 2025-03-03 NOTE — PROGRESS NOTES
physicians.   Provided patient education regarding risk, benefits and treatment options as well as adherence to medication regimen and side effect from these medications.        Assessment:     Diagnosis Orders   1. Dizziness        2. New persistent daily headache  CT HEAD WO CONTRAST      3. Ataxia        4. Vestibular disorders, bilateral  Ambulatory referral to Physical Therapy      5. Bilateral sensorineural hearing loss        6. Vitamin B12 deficiency disease  Vitamin B12 & Folate            Chronic dizziness with new daily persistent headache, not controlled.  Possible differential could include chronic vestibular disorder, benign paroxysmal vertigo, tension headache or sinus headache.  Patient refused MRI brain.  Will get CT head to exclude secondary causes for headaches.  Not enough criteria to make migraine.  B12 deficiency  Chronic hearing impairment which could worsen her vestibular system  Chronic anticoagulation    Plan:  CT head  Refer to PT and OT for vestibular training  Recheck B12 and folate  May need B12 injection monthly  Vestibular precautions, training, risk of falling and injury on Eliquis discussed  Avoid meclizine daily and only use it as needed  Blood pressure monitor on diet and current dose of lisinopril 40 mg daily  Follow-up with me after the above workup

## 2025-03-03 NOTE — PATIENT INSTRUCTIONS
YOU MUST CONFIRM YOUR APPOINTMENT 1 DAY PRIOR OR IT WILL BE CANCELLED!!   Our office will call you 3 times the day prior to your appointment in an attempt to confirm.  Please return our call ASAP or confirm your appt through [employer] no later than 3 pm the day before your appointment.  If we do not hear back from you by 3 pm to confirm, your appointment will be cancelled & someone will be added into that slot from our wait list.

## 2025-03-04 LAB
FOLATE SERPL-MCNC: 12 NG/ML (ref 4.78–24.2)
VIT B12 SERPL-MCNC: 266 PG/ML (ref 211–911)

## 2025-03-12 ENCOUNTER — RESULTS FOLLOW-UP (OUTPATIENT)
Dept: NEUROLOGY | Age: 80
End: 2025-03-12

## 2025-03-12 DIAGNOSIS — E53.8 VITAMIN B12 DEFICIENCY DISEASE: Primary | ICD-10-CM

## 2025-03-12 RX ORDER — CYANOCOBALAMIN 1000 UG/ML
INJECTION, SOLUTION INTRAMUSCULAR; SUBCUTANEOUS
Qty: 6 ML | Refills: 0 | Status: SHIPPED | OUTPATIENT
Start: 2025-03-12

## 2025-03-13 ENCOUNTER — HOSPITAL ENCOUNTER (OUTPATIENT)
Dept: CT IMAGING | Age: 80
Discharge: HOME | End: 2025-03-13
Attending: PSYCHIATRY & NEUROLOGY
Payer: MEDICARE

## 2025-03-13 ENCOUNTER — RESULTS FOLLOW-UP (OUTPATIENT)
Dept: CT IMAGING | Age: 80
End: 2025-03-13

## 2025-03-13 DIAGNOSIS — G44.52 NEW PERSISTENT DAILY HEADACHE: ICD-10-CM

## 2025-03-13 PROCEDURE — 70450 CT HEAD/BRAIN W/O DYE: CPT

## 2025-03-14 ENCOUNTER — TELEPHONE (OUTPATIENT)
Dept: NEUROLOGY | Age: 80
End: 2025-03-14

## 2025-03-14 NOTE — TELEPHONE ENCOUNTER
Pt states she is experiencing dizziness upon wakening and continuing throughout the day. Currently taking meclizine w/o relief. Starts PT next week but states it took her approx 20 visits to resolve the dizziness the last time she had dizziness issues. She has cataract surgery planned in 2 weeks and can-not do PT during the recovery period. She is requesting something for the dizziness.

## 2025-03-17 ENCOUNTER — HOSPITAL ENCOUNTER (OUTPATIENT)
Dept: PHYSICAL THERAPY | Age: 80
Setting detail: THERAPIES SERIES
Discharge: HOME | End: 2025-03-17
Attending: PSYCHIATRY & NEUROLOGY
Payer: MEDICARE

## 2025-03-17 DIAGNOSIS — R42 DIZZINESS: ICD-10-CM

## 2025-03-17 DIAGNOSIS — H81.12 BPPV (BENIGN PAROXYSMAL POSITIONAL VERTIGO), LEFT: Primary | ICD-10-CM

## 2025-03-17 DIAGNOSIS — R26.89 IMBALANCE: ICD-10-CM

## 2025-03-17 DIAGNOSIS — R26.2 DIFFICULTY WALKING: ICD-10-CM

## 2025-03-17 PROCEDURE — 95992 CANALITH REPOSITIONING PROC: CPT

## 2025-03-17 PROCEDURE — 97161 PT EVAL LOW COMPLEX 20 MIN: CPT

## 2025-03-17 PROCEDURE — 97112 NEUROMUSCULAR REEDUCATION: CPT

## 2025-03-17 NOTE — PLAN OF CARE
[facility] - Outpatient Rehabilitation and Therapy: [address], [place] [address] office: [phone] fax: [phone]     Physical Therapy Initial Evaluation Certification      Dear Luciano Delarosa MD,    We had the pleasure of evaluating the following patient for physical therapy services at [facility].  A summary of our findings can be found in the initial assessment below.  This includes our plan of care.  If you have any questions or concerns regarding these findings, please do not hesitate to contact me at the office phone number listed above.  Thank you for the referral.     Physician Signature:_______________________________Date:__________________  By signing above (or electronic signature), therapist’s plan is approved by physician       Physical Therapy: TREATMENT/PROGRESS NOTE   Patient: Nimisha Joshi (79 y.o. female)   Examination Date: 2025   :  1945 MRN: [id number]   Visit #: 1   Insurance Allowable Auth Needed   Pending  [x]Yes    []No    Insurance: Payor: HUMANA MEDICARE / Plan: HUMANA CHOICE-PPO MEDICARE / Product Type: *No Product type* /   Insurance ID: [id number] - (Medicare Managed)  Secondary Insurance (if applicable):    Treatment Diagnosis:     ICD-10-CM    1. BPPV (benign paroxysmal positional vertigo), left  H81.12       2. Dizziness  R42       3. Imbalance  R26.89       4. 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]   Referring Physician: Luciano Delarosa MD  PCP: Giulia Hunt DO       Plan of care signed (Y/N):     Date of Patient follow up with Physician:      Plan of Care Report: EVAL today  POC update due: (10 visits /OR AUTH LIMITS, whichever is less)  2025                                             Medical History:  Comorbidities:  Other: Chronic low back pain   Relevant Medical History:                                          Precautions/

## 2025-03-18 ENCOUNTER — LAB (OUTPATIENT)
Dept: NEUROLOGY | Age: 80
End: 2025-03-18

## 2025-03-18 DIAGNOSIS — E53.8 VITAMIN B12 DEFICIENCY DISEASE: Primary | ICD-10-CM

## 2025-03-18 RX ORDER — CYANOCOBALAMIN 1000 UG/ML
1000 INJECTION, SOLUTION INTRAMUSCULAR; SUBCUTANEOUS ONCE
Status: COMPLETED | OUTPATIENT
Start: 2025-03-18 | End: 2025-03-18

## 2025-03-18 RX ADMIN — CYANOCOBALAMIN 1000 MCG: 1000 INJECTION, SOLUTION INTRAMUSCULAR; SUBCUTANEOUS at 16:00

## 2025-03-21 ENCOUNTER — HOSPITAL ENCOUNTER (OUTPATIENT)
Dept: PHYSICAL THERAPY | Age: 80
Setting detail: THERAPIES SERIES
Discharge: HOME | End: 2025-03-21
Attending: PSYCHIATRY & NEUROLOGY
Payer: MEDICARE

## 2025-03-21 PROCEDURE — 95992 CANALITH REPOSITIONING PROC: CPT

## 2025-03-21 PROCEDURE — 97110 THERAPEUTIC EXERCISES: CPT

## 2025-03-21 PROCEDURE — 97530 THERAPEUTIC ACTIVITIES: CPT

## 2025-03-21 NOTE — PLAN OF CARE
interventions:    Interventions:  Therapeutic Exercise (67786) including: strength training, ROM, and functional mobility  Therapeutic Activities (84346) including: functional mobility training and education.  Neuromuscular Re-education (76701) activation and proprioception, including postural re-education.    Gait Training (29348) for normalization of ambulation patterns and AD training.   Manual Therapy (98118) as indicated to include: Passive Range of Motion, Gr I-IV mobilizations, Grade V Manipulation, and Soft Tissue Mobilization  Patient education on joint protection, postural re-education, activity modification, progression of HEP, and vestibular fuction/BPPV  CRP for assessment, treatment and education of BPPV    Plan:  VRT, manual , CRT as needed    Electronically Signed by Sd Tidwell, ROMA  Date: 03/21/2025     Note: Portions of this note have been templated and/or copied from initial evaluation, reassessments and prior notes for documentation efficiency.    Note: If patient does not return for scheduled/recommended follow up visits, this note will serve as a discharge from care along with the most recent update on progress.    Vestibular Evaluation

## 2025-03-24 ENCOUNTER — HOSPITAL ENCOUNTER (OUTPATIENT)
Dept: PHYSICAL THERAPY | Age: 80
Setting detail: THERAPIES SERIES
Discharge: HOME | End: 2025-03-24
Attending: PSYCHIATRY & NEUROLOGY
Payer: MEDICARE

## 2025-03-24 PROCEDURE — 97112 NEUROMUSCULAR REEDUCATION: CPT

## 2025-03-24 PROCEDURE — 97140 MANUAL THERAPY 1/> REGIONS: CPT

## 2025-03-24 PROCEDURE — 97110 THERAPEUTIC EXERCISES: CPT

## 2025-03-24 NOTE — FLOWSHEET NOTE
[facility] - Outpatient Rehabilitation and Therapy: [address], [place] [address] office: [phone] fax: [phone]         Physical Therapy: TREATMENT/PROGRESS NOTE   Patient: Nimisha Joshi (79 y.o. female)   Examination Date: 2025   :  1945 MRN: [id number]   Visit #: 3   Insurance Allowable Auth Needed   Pending  [x]Yes    []No    Insurance: Payor: HUMANA MEDICARE / Plan: HUMANA CHOICE-PPO MEDICARE / Product Type: *No Product type* /   Insurance ID: [id number] - (Medicare Managed)  Secondary Insurance (if applicable):    Treatment Diagnosis:     ICD-10-CM    1. BPPV (benign paroxysmal positional vertigo), left  H81.12       2. Dizziness  R42       3. Imbalance  R26.89       4. 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]   Referring Physician: Luciano Delarsoa MD  PCP: Giulia Hunt DO       Plan of care signed (Y/N):     Date of Patient follow up with Physician:      Plan of Care Report: NO  POC update due: (10 visits /OR AUTH LIMITS, whichever is less)  2025                                             Medical History:  Comorbidities:  Other: Chronic low back pain   Relevant Medical History:                                          Precautions/ Contra-indications:           Latex allergy:  NO  Pacemaker:    NO  Contraindications for Manipulation: None  Date of Surgery:   Other:    Red Flags:  None    Suicide Screening:   The patient did not verbalize a primary behavioral concern, suicidal ideation, suicidal intent, or demonstrate suicidal behaviors.    Preferred Language for Healthcare:   [x] English       [] other:    SUBJECTIVE EXAMINATION     Patient stated complaint/comment:  Patient reports that her dizziness is still persistent daily . Says her dizziness is throughout the day brought on without head movement. States that she also having HA's.     Eval : Patient reports that her dizziness began a few

## 2025-03-28 ENCOUNTER — APPOINTMENT (OUTPATIENT)
Dept: PHYSICAL THERAPY | Age: 80
End: 2025-03-28
Attending: PSYCHIATRY & NEUROLOGY
Payer: MEDICARE

## 2025-03-31 ENCOUNTER — HOSPITAL ENCOUNTER (OUTPATIENT)
Dept: PHYSICAL THERAPY | Age: 80
Setting detail: THERAPIES SERIES
Discharge: HOME | End: 2025-03-31
Attending: PSYCHIATRY & NEUROLOGY
Payer: MEDICARE

## 2025-03-31 PROCEDURE — 97112 NEUROMUSCULAR REEDUCATION: CPT

## 2025-03-31 PROCEDURE — 97110 THERAPEUTIC EXERCISES: CPT

## 2025-03-31 PROCEDURE — 97140 MANUAL THERAPY 1/> REGIONS: CPT

## 2025-03-31 NOTE — FLOWSHEET NOTE
[facility] - Outpatient Rehabilitation and Therapy: [address], [place] [address] office: [phone] fax: [phone]         Physical Therapy: TREATMENT/PROGRESS NOTE   Patient: Nimisha Joshi (79 y.o. female)   Examination Date: 2025   :  1945 MRN: [id number]   Visit #: 4   Insurance Allowable Auth Needed   10 visits approved   3/17/25 to 25 [x]Yes    []No    Insurance: Payor: HUMANA MEDICARE / Plan: HUMANA CHOICE-PPO MEDICARE / Product Type: *No Product type* /   Insurance ID: [id number] - (Medicare Managed)  Secondary Insurance (if applicable):    Treatment Diagnosis:     ICD-10-CM    1. BPPV (benign paroxysmal positional vertigo), left  H81.12       2. Dizziness  R42       3. Imbalance  R26.89       4. 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]   Referring Physician: Luciano Delarosa MD  PCP: Giulia Hunt DO       Plan of care signed (Y/N): Y    Date of Patient follow up with Physician: TBNENITA     Plan of Care Report: NO  POC update due: (10 visits /OR AUTH LIMITS, whichever is less)  2025                                             Medical History:  Comorbidities:  Other: Chronic low back pain   Relevant Medical History:                                          Precautions/ Contra-indications:           Latex allergy:  NO  Pacemaker:    NO  Contraindications for Manipulation: None  Date of Surgery:   Other:    Red Flags:  None    Suicide Screening:   The patient did not verbalize a primary behavioral concern, suicidal ideation, suicidal intent, or demonstrate suicidal behaviors.    Preferred Language for Healthcare:   [x] English       [] other:    SUBJECTIVE EXAMINATION     Patient stated complaint/comment:  Patient reports that she had been battling dizziness / headache over the weekend . Says her symptoms are mild today.     Patient reports that her dizziness is still persistent daily . Says her

## 2025-04-02 ENCOUNTER — HOSPITAL ENCOUNTER (OUTPATIENT)
Dept: PHYSICAL THERAPY | Age: 80
Setting detail: THERAPIES SERIES
Discharge: HOME | End: 2025-04-02
Attending: PSYCHIATRY & NEUROLOGY
Payer: MEDICARE

## 2025-04-02 PROCEDURE — 97110 THERAPEUTIC EXERCISES: CPT

## 2025-04-02 PROCEDURE — 97140 MANUAL THERAPY 1/> REGIONS: CPT

## 2025-04-02 PROCEDURE — 97112 NEUROMUSCULAR REEDUCATION: CPT

## 2025-04-02 NOTE — FLOWSHEET NOTE
[facility] - Outpatient Rehabilitation and Therapy: [address], [place] [address] office: [phone] fax: [phone]         Physical Therapy: TREATMENT/PROGRESS NOTE   Patient: Nimisha Joshi (79 y.o. female)   Examination Date: 2025   :  1945 MRN: [id number]   Visit #: 5   Insurance Allowable Auth Needed   10 visits approved   3/17/25 to 25 [x]Yes    []No    Insurance: Payor: HUMANA MEDICARE / Plan: HUMANA CHOICE-PPO MEDICARE / Product Type: *No Product type* /   Insurance ID: [id number] - (Medicare Managed)  Secondary Insurance (if applicable):    Treatment Diagnosis:     ICD-10-CM    1. BPPV (benign paroxysmal positional vertigo), left  H81.12       2. Dizziness  R42       3. Imbalance  R26.89       4. 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]   Referring Physician: Luciano Delarosa MD  PCP: Giulia Hunt DO       Plan of care signed (Y/N): Y    Date of Patient follow up with Physician: TBT     Plan of Care Report: NO  POC update due: (10 visits /OR AUTH LIMITS, whichever is less)  2025                                             Medical History:  Comorbidities:  Other: Chronic low back pain   Relevant Medical History:                                          Precautions/ Contra-indications:           Latex allergy:  NO  Pacemaker:    NO  Contraindications for Manipulation: None  Date of Surgery:   Other:    Red Flags:  None    Suicide Screening:   The patient did not verbalize a primary behavioral concern, suicidal ideation, suicidal intent, or demonstrate suicidal behaviors.    Preferred Language for Healthcare:   [x] English       [] other:    SUBJECTIVE EXAMINATION     Patient stated complaint/comment:  : Patient states that she had a good day yesterday. Today she has slight HA. Reports that she has cataract surgery scheduled for tomorrow.       Patient reports that she had been battling

## 2025-04-14 ENCOUNTER — HOSPITAL ENCOUNTER (OUTPATIENT)
Dept: PHYSICAL THERAPY | Age: 80
Setting detail: THERAPIES SERIES
Discharge: HOME | End: 2025-04-14
Attending: PSYCHIATRY & NEUROLOGY
Payer: MEDICARE

## 2025-04-14 PROCEDURE — 97112 NEUROMUSCULAR REEDUCATION: CPT

## 2025-04-14 PROCEDURE — 97110 THERAPEUTIC EXERCISES: CPT

## 2025-04-14 PROCEDURE — 97140 MANUAL THERAPY 1/> REGIONS: CPT

## 2025-04-14 NOTE — FLOWSHEET NOTE
[facility] - Outpatient Rehabilitation and Therapy: [address], [place] [address] office: [phone] fax: [phone]         Physical Therapy: TREATMENT/PROGRESS NOTE   Patient: Nimisha Joshi (79 y.o. female)   Examination Date: 2025   :  1945 MRN: [id number]   Visit #: 6   Insurance Allowable Auth Needed   10 visits approved   3/17/25 to 25 [x]Yes    []No    Insurance: Payor: HUMANA MEDICARE / Plan: HUMANA CHOICE-PPO MEDICARE / Product Type: *No Product type* /   Insurance ID: [id number] - (Medicare Managed)  Secondary Insurance (if applicable):    Treatment Diagnosis:     ICD-10-CM    1. BPPV (benign paroxysmal positional vertigo), left  H81.12       2. Dizziness  R42       3. Imbalance  R26.89       4. 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]   Referring Physician: Luciano Delarosa MD  PCP: Giulia Hunt DO       Plan of care signed (Y/N): Y    Date of Patient follow up with Physician: TBT     Plan of Care Report: NO  POC update due: (10 visits /OR AUTH LIMITS, whichever is less)  2025                                             Medical History:  Comorbidities:  Other: Chronic low back pain   Relevant Medical History:                                          Precautions/ Contra-indications:           Latex allergy:  NO  Pacemaker:    NO  Contraindications for Manipulation: None  Date of Surgery:   Other:    Red Flags:  None    Suicide Screening:   The patient did not verbalize a primary behavioral concern, suicidal ideation, suicidal intent, or demonstrate suicidal behaviors.    Preferred Language for Healthcare:   [x] English       [] other:    SUBJECTIVE EXAMINATION     Patient stated complaint/comment:  : Patient returned 2 weeks after cataract surgery. States that she only has had minimal dizziness.     : Patient states that she had a good day yesterday. Today she has slight HA. Reports

## 2025-04-15 ENCOUNTER — LAB (OUTPATIENT)
Dept: NEUROLOGY | Age: 80
End: 2025-04-15
Payer: MEDICARE

## 2025-04-15 DIAGNOSIS — E53.8 B12 DEFICIENCY: ICD-10-CM

## 2025-04-15 DIAGNOSIS — E53.8 VITAMIN B12 DEFICIENCY DISEASE: Primary | ICD-10-CM

## 2025-04-15 PROCEDURE — 96372 THER/PROPH/DIAG INJ SC/IM: CPT | Performed by: PSYCHIATRY & NEUROLOGY

## 2025-04-15 RX ORDER — CYANOCOBALAMIN 1000 UG/ML
1000 INJECTION, SOLUTION INTRAMUSCULAR; SUBCUTANEOUS ONCE
Status: COMPLETED | OUTPATIENT
Start: 2025-04-15 | End: 2025-04-15

## 2025-04-15 RX ADMIN — CYANOCOBALAMIN 1000 MCG: 1000 INJECTION, SOLUTION INTRAMUSCULAR; SUBCUTANEOUS at 17:15

## 2025-04-18 ENCOUNTER — HOSPITAL ENCOUNTER (OUTPATIENT)
Dept: PHYSICAL THERAPY | Age: 80
Setting detail: THERAPIES SERIES
Discharge: HOME | End: 2025-04-18
Attending: PSYCHIATRY & NEUROLOGY
Payer: MEDICARE

## 2025-04-18 PROCEDURE — 97140 MANUAL THERAPY 1/> REGIONS: CPT

## 2025-04-18 PROCEDURE — 97112 NEUROMUSCULAR REEDUCATION: CPT

## 2025-04-18 NOTE — FLOWSHEET NOTE
[facility] - Outpatient Rehabilitation and Therapy: [address], [place] [address] office: [phone] fax: [phone]         Physical Therapy: TREATMENT/PROGRESS NOTE   Patient: Nimisha Joshi (79 y.o. female)   Examination Date: 2025   :  1945 MRN: [id number]   Visit #: 7   Insurance Allowable Auth Needed   10 visits approved   3/17/25 to 25 [x]Yes    []No    Insurance: Payor: HUMANA MEDICARE / Plan: HUMANA CHOICE-PPO MEDICARE / Product Type: *No Product type* /   Insurance ID: [id number] - (Medicare Managed)  Secondary Insurance (if applicable):    Treatment Diagnosis:     ICD-10-CM    1. BPPV (benign paroxysmal positional vertigo), left  H81.12       2. Dizziness  R42       3. Imbalance  R26.89       4. 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]   Referring Physician: Luciano Delarosa MD  PCP: Giulia Hunt DO       Plan of care signed (Y/N): Y    Date of Patient follow up with Physician: TBT     Plan of Care Report: NO  POC update due: (10 visits /OR AUTH LIMITS, whichever is less)  2025                                             Medical History:  Comorbidities:  Other: Chronic low back pain   Relevant Medical History:                                          Precautions/ Contra-indications:           Latex allergy:  NO  Pacemaker:    NO  Contraindications for Manipulation: None  Date of Surgery:   Other:    Red Flags:  None    Suicide Screening:   The patient did not verbalize a primary behavioral concern, suicidal ideation, suicidal intent, or demonstrate suicidal behaviors.    Preferred Language for Healthcare:   [x] English       [] other:    SUBJECTIVE EXAMINATION     Patient stated complaint/comment:  : Patient states she slept well on some older pillows . Still no dizziness in the past week.     : Patient returned 2 weeks after cataract surgery. States that she only has had minimal

## 2025-04-21 ENCOUNTER — HOSPITAL ENCOUNTER (OUTPATIENT)
Dept: PHYSICAL THERAPY | Age: 80
Setting detail: THERAPIES SERIES
Discharge: HOME | End: 2025-04-21
Attending: PSYCHIATRY & NEUROLOGY
Payer: MEDICARE

## 2025-04-21 PROCEDURE — 97112 NEUROMUSCULAR REEDUCATION: CPT

## 2025-04-21 PROCEDURE — 97530 THERAPEUTIC ACTIVITIES: CPT

## 2025-04-21 PROCEDURE — 97110 THERAPEUTIC EXERCISES: CPT

## 2025-04-21 NOTE — FLOWSHEET NOTE
[facility] - Outpatient Rehabilitation and Therapy: [address], [place] [address] office: [phone] fax: [phone]         Physical Therapy: TREATMENT/PROGRESS NOTE   Patient: Nimisha Joshi (79 y.o. female)   Examination Date: 2025   :  1945 MRN: [id number]   Visit #: 8   Insurance Allowable Auth Needed   10 visits approved   3/17/25 to 25 [x]Yes    []No    Insurance: Payor: HUMANA MEDICARE / Plan: HUMANA CHOICE-PPO MEDICARE / Product Type: *No Product type* /   Insurance ID: [id number] - (Medicare Managed)  Secondary Insurance (if applicable):    Treatment Diagnosis:     ICD-10-CM    1. BPPV (benign paroxysmal positional vertigo), left  H81.12       2. Dizziness  R42       3. Imbalance  R26.89       4. 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]   Referring Physician: Luciano Delarosa MD  PCP: Giulia Hunt DO       Plan of care signed (Y/N): Y    Date of Patient follow up with Physician: TBT     Plan of Care Report: NO  POC update due: (10 visits /OR AUTH LIMITS, whichever is less)  2025                                             Medical History:  Comorbidities:  Other: Chronic low back pain   Relevant Medical History:                                          Precautions/ Contra-indications:           Latex allergy:  NO  Pacemaker:    NO  Contraindications for Manipulation: None  Date of Surgery:   Other:    Red Flags:  None    Suicide Screening:   The patient did not verbalize a primary behavioral concern, suicidal ideation, suicidal intent, or demonstrate suicidal behaviors.    Preferred Language for Healthcare:   [x] English       [] other:    SUBJECTIVE EXAMINATION     Patient stated complaint/comment:  : The patient reports that she had good weekend without any episodes of dizziness.     : Patient states she slept well on some older pillows . Still no dizziness in the past week.     :

## 2025-04-23 ENCOUNTER — HOSPITAL ENCOUNTER (OUTPATIENT)
Dept: PHYSICAL THERAPY | Age: 80
Setting detail: THERAPIES SERIES
Discharge: HOME | End: 2025-04-23
Attending: PSYCHIATRY & NEUROLOGY
Payer: MEDICARE

## 2025-04-23 PROCEDURE — 97530 THERAPEUTIC ACTIVITIES: CPT

## 2025-04-23 PROCEDURE — 97110 THERAPEUTIC EXERCISES: CPT

## 2025-04-23 NOTE — PLAN OF CARE
[facility] - Outpatient Rehabilitation and Therapy: [address], [place] [address] office: [phone] fax: [phone]     Physical Therapy Re-Certification Plan of Care    Dear Luciano Delarosa MD  ,    We had the pleasure of treating the following patient for physical therapy services at [facility]. A summary of our findings can be found in the updated assessment below.  This includes our plan of care.  If you have any questions or concerns regarding these findings, please do not hesitate to contact me at the office phone number checked above.  Thank you for the referral.     Physician Signature:________________________________Date:__________________  By signing above (or electronic signature), therapist's plan is approved by physician      Total Visits: 9     Overall Response to Treatment:  The patient has made intermittent progress since beginning vestibular rehab. The last two weeks she went with having dizziness. However , this morning she had dizziness lasting most of the day along with MAYA.  No room spinning sensation. She will benefit from skilled PT , however pt will  need further consultation regarding pattern dizziness that looks possible \" Migraine\" central origin .  Therapist willing discussed symptoms with referring Physician .       Recommendation:    [x] Continue PT 2x / wk for 4 weeks.   [] Hold PT, pending MD visit   [] Discharge to [facility]. Follow up with PT or MD PRN.      Physical Therapy: TREATMENT/PROGRESS NOTE   Patient: Nimisha Joshi (79 y.o. female)   Examination Date: 2025   :  1945 MRN: [id number]   Visit #: 9   Insurance Allowable Auth Needed   10 visits approved   3/17/25 to 25 [x]Yes    []No    Insurance: Payor: HUMANA MEDICARE / Plan: HUMANA CHOICE-PPO MEDICARE / Product Type: *No Product type* /   Insurance ID: [id number] - (Medicare Managed)  Secondary Insurance (if applicable):    Treatment Diagnosis:

## 2025-04-28 ENCOUNTER — HOSPITAL ENCOUNTER (OUTPATIENT)
Dept: PHYSICAL THERAPY | Age: 80
Setting detail: THERAPIES SERIES
Discharge: HOME | End: 2025-04-28
Attending: PSYCHIATRY & NEUROLOGY
Payer: MEDICARE

## 2025-04-28 PROCEDURE — 97140 MANUAL THERAPY 1/> REGIONS: CPT

## 2025-04-28 PROCEDURE — 97110 THERAPEUTIC EXERCISES: CPT

## 2025-04-28 PROCEDURE — 97530 THERAPEUTIC ACTIVITIES: CPT

## 2025-04-28 NOTE — FLOWSHEET NOTE
[facility] - Outpatient Rehabilitation and Therapy: [address], [place] [address] office: [phone] fax: [phone]     Recommendation:    [x] Continue PT 2x / wk for 4 weeks.   [] Hold PT, pending MD visit   [] Discharge to [facility]. Follow up with PT or MD PRN.      Physical Therapy: TREATMENT/PROGRESS NOTE   Patient: Nimisha Joshi (79 y.o. female)   Examination Date: 2025   :  1945 MRN: [id number]   Visit #: 10   Insurance Allowable Auth Needed   10 visits approved   3/17/25 to 25 [x]Yes    []No    Insurance: Payor: HUMANA MEDICARE / Plan: HUMANA CHOICE-PPO MEDICARE / Product Type: *No Product type* /   Insurance ID: [id number] - (Medicare Managed)  Secondary Insurance (if applicable):    Treatment Diagnosis:     ICD-10-CM    1. BPPV (benign paroxysmal positional vertigo), left  H81.12       2. Dizziness  R42       3. Imbalance  R26.89       4. 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]   Referring Physician: Luciano Delarosa MD  PCP: Giulia Hunt DO       Plan of care signed (Y/N): Y    Date of Patient follow up with Physician: TBNENITA     Plan of Care Report: NO  POC update due: (10 visits /OR AUTH LIMITS, whichever is less)  2025                                             Medical History:  Comorbidities:  Other: Chronic low back pain   Relevant Medical History:                                          Precautions/ Contra-indications:           Latex allergy:  NO  Pacemaker:    NO  Contraindications for Manipulation: None  Date of Surgery:   Other:    Red Flags:  None    Suicide Screening:   The patient did not verbalize a primary behavioral concern, suicidal ideation, suicidal intent, or demonstrate suicidal behaviors.    Preferred Language for Healthcare:   [x] English       [] other:    SUBJECTIVE EXAMINATION     Patient stated complaint/comment:  : Patient reports that she's had dizziness

## 2025-04-29 ENCOUNTER — OFFICE VISIT (OUTPATIENT)
Dept: NEUROLOGY | Age: 80
End: 2025-04-29
Payer: MEDICARE

## 2025-04-29 VITALS
HEIGHT: 59 IN | SYSTOLIC BLOOD PRESSURE: 126 MMHG | OXYGEN SATURATION: 95 % | BODY MASS INDEX: 33.06 KG/M2 | RESPIRATION RATE: 16 BRPM | WEIGHT: 164 LBS | DIASTOLIC BLOOD PRESSURE: 78 MMHG | HEART RATE: 75 BPM

## 2025-04-29 DIAGNOSIS — I10 HTN (HYPERTENSION), BENIGN: ICD-10-CM

## 2025-04-29 DIAGNOSIS — H81.93 VESTIBULAR DISORDERS, BILATERAL: ICD-10-CM

## 2025-04-29 DIAGNOSIS — R42 DIZZINESS: Primary | ICD-10-CM

## 2025-04-29 DIAGNOSIS — G44.52 NEW PERSISTENT DAILY HEADACHE: ICD-10-CM

## 2025-04-29 DIAGNOSIS — E53.8 VITAMIN B12 DEFICIENCY DISEASE: ICD-10-CM

## 2025-04-29 PROCEDURE — 99214 OFFICE O/P EST MOD 30 MIN: CPT | Performed by: PSYCHIATRY & NEUROLOGY

## 2025-04-29 PROCEDURE — 1123F ACP DISCUSS/DSCN MKR DOCD: CPT | Performed by: PSYCHIATRY & NEUROLOGY

## 2025-04-29 PROCEDURE — 1090F PRES/ABSN URINE INCON ASSESS: CPT | Performed by: PSYCHIATRY & NEUROLOGY

## 2025-04-29 PROCEDURE — 3074F SYST BP LT 130 MM HG: CPT | Performed by: PSYCHIATRY & NEUROLOGY

## 2025-04-29 PROCEDURE — 1160F RVW MEDS BY RX/DR IN RCRD: CPT | Performed by: PSYCHIATRY & NEUROLOGY

## 2025-04-29 PROCEDURE — G8399 PT W/DXA RESULTS DOCUMENT: HCPCS | Performed by: PSYCHIATRY & NEUROLOGY

## 2025-04-29 PROCEDURE — G8417 CALC BMI ABV UP PARAM F/U: HCPCS | Performed by: PSYCHIATRY & NEUROLOGY

## 2025-04-29 PROCEDURE — 1036F TOBACCO NON-USER: CPT | Performed by: PSYCHIATRY & NEUROLOGY

## 2025-04-29 PROCEDURE — 3078F DIAST BP <80 MM HG: CPT | Performed by: PSYCHIATRY & NEUROLOGY

## 2025-04-29 PROCEDURE — G8427 DOCREV CUR MEDS BY ELIG CLIN: HCPCS | Performed by: PSYCHIATRY & NEUROLOGY

## 2025-04-29 PROCEDURE — 1159F MED LIST DOCD IN RCRD: CPT | Performed by: PSYCHIATRY & NEUROLOGY

## 2025-04-29 RX ORDER — TOPIRAMATE 25 MG/1
25 TABLET, FILM COATED ORAL 2 TIMES DAILY
Qty: 60 TABLET | Refills: 2 | Status: SHIPPED | OUTPATIENT
Start: 2025-04-29

## 2025-04-29 NOTE — PATIENT INSTRUCTIONS

## 2025-04-29 NOTE — PROGRESS NOTES
The patient came today for follow up regarding: chronic dizziness and headaches.     Since the patient's last visit, she had her CT head which was nl. B12 came back low at 266 and she received three injection of B12 so far.    She continues to have daily dizziness.  Decrease moderate to severe persistent.  Waxing and waning.  Worse with head or body movement.  No severe spinning sensation.  Some nausea but no vomiting.  Other associated symptoms including moderate headache for few minutes.  No visual changes.  Walks with a cane.  No fall or injury.  Tried PT and OT with moderate improvement.  She tried meclizine without help.  Blood pressure has been controlled at home.  No other new symptoms today.  Other review of system was unremarkable.      Exam:   Constitutional:   Vitals:    04/29/25 1025   BP: 126/78   BP Site: Right Upper Arm   Patient Position: Sitting   BP Cuff Size: Small Adult   Pulse: 75   Resp: 16   SpO2: 95%   Weight: 74.4 kg (164 lb)   Height: 1.499 m (4' 11\")       General appearance:  Normal development and appear in no acute distress.   Mental Status:   Oriented to person, place, problem, and time.    Memory: Good immediate recall.  Intact remote memory  Normal attention span and concentration.  Language: intact naming, repeating and fluency   Good fund of Knowledge. Aware of current events and vocabulary   Cranial Nerves: Pupil round regular and reactive, extraocular muscles were intact, no ophthalmoplegia, face is symmetric, tongue is midline and rest of cranial nerves are normal    Musculoskeletal: no focal weakness in UE or LL.   Reflexes: Symmetric 2+ in both arms and legs  Coordination:no abnormal movements or dysmetria  Sensation: normal in all extremities.  Gait/Posture: steady gait with normal posturing and station.  With her cane.  No changes.    ROS : A 10-14 system review of constitutional, cardiovascular, respiratory, GI, eyes, , ENT, musculoskeletal, endocrine, hematological,

## 2025-05-13 ENCOUNTER — LAB (OUTPATIENT)
Dept: NEUROLOGY | Age: 80
End: 2025-05-13
Payer: MEDICARE

## 2025-05-13 DIAGNOSIS — E53.8 VITAMIN B12 DEFICIENCY DISEASE: Primary | ICD-10-CM

## 2025-05-13 PROCEDURE — 96372 THER/PROPH/DIAG INJ SC/IM: CPT | Performed by: PSYCHIATRY & NEUROLOGY

## 2025-05-13 RX ORDER — CYANOCOBALAMIN 1000 UG/ML
1000 INJECTION, SOLUTION INTRAMUSCULAR; SUBCUTANEOUS ONCE
Status: COMPLETED | OUTPATIENT
Start: 2025-05-13 | End: 2025-05-13

## 2025-05-13 RX ADMIN — CYANOCOBALAMIN 1000 MCG: 1000 INJECTION, SOLUTION INTRAMUSCULAR; SUBCUTANEOUS at 13:16

## 2025-05-19 ENCOUNTER — TELEPHONE (OUTPATIENT)
Dept: NEUROLOGY | Age: 80
End: 2025-05-19

## 2025-05-19 NOTE — TELEPHONE ENCOUNTER
Pt called stating she's been on Topamax for a little over 2 weeks and has developed a twitch in hand that is noticeable. Pt Is taking 1 25 mg tablet twice a day, She did start out with 1 when starting medication. Pt was instructed to decrease that medication down for a few days then back up to 2 tablets a day when symptoms resolve. Pt will notify the office if symptoms return/ worsen.

## 2025-05-22 ENCOUNTER — TELEPHONE (OUTPATIENT)
Dept: NEUROLOGY | Age: 80
End: 2025-05-22

## 2025-05-22 DIAGNOSIS — R42 DIZZINESS: Primary | ICD-10-CM

## 2025-05-22 NOTE — TELEPHONE ENCOUNTER
Betty with Dr. Giulia Hunt office called stating Dr. Hunt wants to leave her cell phone number with Dr. Richardson to discuss mutual pt to touch base

## 2025-05-22 NOTE — TELEPHONE ENCOUNTER
Per Dr Richardson,     Spoke to Dr. Hunt over the phone  Patient continues to have persistent dizziness and vertigo and now having side effect from Topamax  Agree with tapering Topamax off completely    The patient will need MRI of the brain since she refused MRI of the brain 2 months ago to exclude posterior fossa changes or abnormalities in addition to CTA head and neck to evaluate vertebrobasilar circulation.    Pt states she is taking topamax once daily. Advised to take QOD for one week and stop.     Pt is agreeable to do MRI under conscious sedation. (D/t severe claustrophobia). She also agrees with CTA Head & Neck.

## 2025-05-27 NOTE — TELEPHONE ENCOUNTER
Spoke to Noah BELTRAN to ensure orders have been rec'd and to reach out to pt to get her scheduled for both MRI & CTA

## 2025-05-28 DIAGNOSIS — R42 DIZZINESS: ICD-10-CM

## 2025-05-29 ENCOUNTER — TELEPHONE (OUTPATIENT)
Dept: NEUROLOGY | Age: 80
End: 2025-05-29

## 2025-05-29 ENCOUNTER — RESULTS FOLLOW-UP (OUTPATIENT)
Dept: NEUROLOGY | Age: 80
End: 2025-05-29

## 2025-05-29 ENCOUNTER — HOSPITAL ENCOUNTER (INPATIENT)
Age: 80
LOS: 1 days | Discharge: HOME | DRG: 684 | End: 2025-05-30
Attending: EMERGENCY MEDICINE | Admitting: INTERNAL MEDICINE
Payer: MEDICARE

## 2025-05-29 DIAGNOSIS — E87.5 HYPERKALEMIA: ICD-10-CM

## 2025-05-29 DIAGNOSIS — N17.9 AKI (ACUTE KIDNEY INJURY): Primary | ICD-10-CM

## 2025-05-29 LAB
ALBUMIN SERPL-MCNC: 3.3 G/DL (ref 3.4–5)
ALBUMIN SERPL-MCNC: 3.9 G/DL (ref 3.4–5)
ALBUMIN/GLOB SERPL: 1.1 {RATIO} (ref 1.1–2.2)
ALBUMIN/GLOB SERPL: 1.2 {RATIO} (ref 1.1–2.2)
ALP SERPL-CCNC: 52 U/L (ref 40–129)
ALP SERPL-CCNC: 63 U/L (ref 40–129)
ALT SERPL-CCNC: <5 U/L (ref 10–40)
ALT SERPL-CCNC: <5 U/L (ref 10–40)
ANION GAP SERPL CALCULATED.3IONS-SCNC: 10 MMOL/L (ref 3–16)
ANION GAP SERPL CALCULATED.3IONS-SCNC: 12 MMOL/L (ref 3–16)
ANION GAP SERPL CALCULATED.3IONS-SCNC: 9 MMOL/L (ref 3–16)
AST SERPL-CCNC: 10 U/L (ref 15–37)
AST SERPL-CCNC: 12 U/L (ref 15–37)
BASOPHILS # BLD: 0 K/UL (ref 0–0.2)
BASOPHILS # BLD: 0.1 K/UL (ref 0–0.2)
BASOPHILS NFR BLD: 0.4 %
BASOPHILS NFR BLD: 0.7 %
BILIRUB SERPL-MCNC: <0.2 MG/DL (ref 0–1)
BILIRUB SERPL-MCNC: <0.2 MG/DL (ref 0–1)
BUN SERPL-MCNC: 50 MG/DL (ref 7–20)
BUN SERPL-MCNC: 53 MG/DL (ref 7–20)
BUN SERPL-MCNC: 53 MG/DL (ref 7–20)
CALCIUM SERPL-MCNC: 9 MG/DL (ref 8.3–10.6)
CALCIUM SERPL-MCNC: 9.6 MG/DL (ref 8.3–10.6)
CALCIUM SERPL-MCNC: 9.8 MG/DL (ref 8.3–10.6)
CHLORIDE SERPL-SCNC: 110 MMOL/L (ref 99–110)
CHLORIDE SERPL-SCNC: 112 MMOL/L (ref 99–110)
CHLORIDE SERPL-SCNC: 112 MMOL/L (ref 99–110)
CHLORIDE UR-SCNC: 51 MMOL/L
CO2 SERPL-SCNC: 13 MMOL/L (ref 21–32)
CO2 SERPL-SCNC: 17 MMOL/L (ref 21–32)
CO2 SERPL-SCNC: 17 MMOL/L (ref 21–32)
CREAT SERPL-MCNC: 2 MG/DL (ref 0.6–1.2)
CREAT SERPL-MCNC: 2.2 MG/DL (ref 0.6–1.2)
CREAT SERPL-MCNC: 2.6 MG/DL (ref 0.6–1.2)
CREAT UR-MCNC: 51.6 MG/DL (ref 28–259)
DEPRECATED RDW RBC AUTO: 13.8 % (ref 12.4–15.4)
DEPRECATED RDW RBC AUTO: 14.2 % (ref 12.4–15.4)
EOSINOPHIL # BLD: 0.1 K/UL (ref 0–0.6)
EOSINOPHIL # BLD: 0.1 K/UL (ref 0–0.6)
EOSINOPHIL NFR BLD: 0.8 %
EOSINOPHIL NFR BLD: 1.7 %
GFR SERPLBLD CREATININE-BSD FMLA CKD-EPI: 18 ML/MIN/{1.73_M2}
GFR SERPLBLD CREATININE-BSD FMLA CKD-EPI: 22 ML/MIN/{1.73_M2}
GFR SERPLBLD CREATININE-BSD FMLA CKD-EPI: 25 ML/MIN/{1.73_M2}
GLUCOSE BLD-MCNC: 109 MG/DL (ref 70–99)
GLUCOSE BLD-MCNC: 120 MG/DL (ref 70–99)
GLUCOSE BLD-MCNC: 140 MG/DL (ref 70–99)
GLUCOSE BLD-MCNC: 140 MG/DL (ref 70–99)
GLUCOSE BLD-MCNC: 143 MG/DL (ref 70–99)
GLUCOSE SERPL-MCNC: 104 MG/DL (ref 70–99)
GLUCOSE SERPL-MCNC: 133 MG/DL (ref 70–99)
GLUCOSE SERPL-MCNC: 95 MG/DL (ref 70–99)
HCT VFR BLD AUTO: 27.1 % (ref 36–48)
HCT VFR BLD AUTO: 30.6 % (ref 36–48)
HGB BLD-MCNC: 8.8 G/DL (ref 12–16)
HGB BLD-MCNC: 9.9 G/DL (ref 12–16)
INR PPP: 1.41 (ref 0.85–1.15)
LYMPHOCYTES # BLD: 3.2 K/UL (ref 1–5.1)
LYMPHOCYTES # BLD: 3.4 K/UL (ref 1–5.1)
LYMPHOCYTES NFR BLD: 38.2 %
LYMPHOCYTES NFR BLD: 42 %
MCH RBC QN AUTO: 29.5 PG (ref 26–34)
MCH RBC QN AUTO: 29.7 PG (ref 26–34)
MCHC RBC AUTO-ENTMCNC: 32.5 G/DL (ref 31–36)
MCHC RBC AUTO-ENTMCNC: 32.7 G/DL (ref 31–36)
MCV RBC AUTO: 90.9 FL (ref 80–100)
MCV RBC AUTO: 90.9 FL (ref 80–100)
MONOCYTES # BLD: 0.7 K/UL (ref 0–1.3)
MONOCYTES # BLD: 0.8 K/UL (ref 0–1.3)
MONOCYTES NFR BLD: 8.2 %
MONOCYTES NFR BLD: 9.2 %
NEUTROPHILS # BLD: 3.8 K/UL (ref 1.7–7.7)
NEUTROPHILS # BLD: 4.4 K/UL (ref 1.7–7.7)
NEUTROPHILS NFR BLD: 47.4 %
NEUTROPHILS NFR BLD: 51.4 %
PERFORMED ON: ABNORMAL
PLATELET # BLD AUTO: 194 K/UL (ref 135–450)
PLATELET # BLD AUTO: 262 K/UL (ref 135–450)
PMV BLD AUTO: 8.8 FL (ref 5–10.5)
PMV BLD AUTO: 9 FL (ref 5–10.5)
POTASSIUM SERPL-SCNC: 4.3 MMOL/L (ref 3.5–5.1)
POTASSIUM SERPL-SCNC: 5.6 MMOL/L (ref 3.5–5.1)
POTASSIUM SERPL-SCNC: 5.7 MMOL/L (ref 3.5–5.1)
POTASSIUM SERPL-SCNC: 6.1 MMOL/L (ref 3.5–5.1)
POTASSIUM UR-SCNC: 13.6 MMOL/L
PROT SERPL-MCNC: 6.1 G/DL (ref 6.4–8.2)
PROT SERPL-MCNC: 7.4 G/DL (ref 6.4–8.2)
PROTHROMBIN TIME: 17.4 SEC (ref 11.9–14.9)
RBC # BLD AUTO: 2.98 M/UL (ref 4–5.2)
RBC # BLD AUTO: 3.37 M/UL (ref 4–5.2)
SODIUM SERPL-SCNC: 136 MMOL/L (ref 136–145)
SODIUM SERPL-SCNC: 137 MMOL/L (ref 136–145)
SODIUM SERPL-SCNC: 139 MMOL/L (ref 136–145)
SODIUM UR-SCNC: 61 MMOL/L
UUN UR-MCNC: 432 MG/DL (ref 800–1666)
WBC # BLD AUTO: 8 K/UL (ref 4–11)
WBC # BLD AUTO: 8.5 K/UL (ref 4–11)

## 2025-05-29 PROCEDURE — 36415 COLL VENOUS BLD VENIPUNCTURE: CPT

## 2025-05-29 PROCEDURE — 80053 COMPREHEN METABOLIC PANEL: CPT

## 2025-05-29 PROCEDURE — 6370000000 HC RX 637 (ALT 250 FOR IP): Performed by: STUDENT IN AN ORGANIZED HEALTH CARE EDUCATION/TRAINING PROGRAM

## 2025-05-29 PROCEDURE — 2580000003 HC RX 258: Performed by: PHYSICIAN ASSISTANT

## 2025-05-29 PROCEDURE — 84540 ASSAY OF URINE/UREA-N: CPT

## 2025-05-29 PROCEDURE — 2580000003 HC RX 258: Performed by: STUDENT IN AN ORGANIZED HEALTH CARE EDUCATION/TRAINING PROGRAM

## 2025-05-29 PROCEDURE — 2500000003 HC RX 250 WO HCPCS: Performed by: PHYSICIAN ASSISTANT

## 2025-05-29 PROCEDURE — 99285 EMERGENCY DEPT VISIT HI MDM: CPT

## 2025-05-29 PROCEDURE — 2500000003 HC RX 250 WO HCPCS: Performed by: INTERNAL MEDICINE

## 2025-05-29 PROCEDURE — 94640 AIRWAY INHALATION TREATMENT: CPT

## 2025-05-29 PROCEDURE — 6370000000 HC RX 637 (ALT 250 FOR IP): Performed by: NURSE PRACTITIONER

## 2025-05-29 PROCEDURE — 84133 ASSAY OF URINE POTASSIUM: CPT

## 2025-05-29 PROCEDURE — 84132 ASSAY OF SERUM POTASSIUM: CPT

## 2025-05-29 PROCEDURE — 85025 COMPLETE CBC W/AUTO DIFF WBC: CPT

## 2025-05-29 PROCEDURE — 82570 ASSAY OF URINE CREATININE: CPT

## 2025-05-29 PROCEDURE — 6370000000 HC RX 637 (ALT 250 FOR IP): Performed by: INTERNAL MEDICINE

## 2025-05-29 PROCEDURE — 2580000003 HC RX 258: Performed by: INTERNAL MEDICINE

## 2025-05-29 PROCEDURE — 82436 ASSAY OF URINE CHLORIDE: CPT

## 2025-05-29 PROCEDURE — 85610 PROTHROMBIN TIME: CPT

## 2025-05-29 PROCEDURE — 6370000000 HC RX 637 (ALT 250 FOR IP): Performed by: PHYSICIAN ASSISTANT

## 2025-05-29 PROCEDURE — 2500000003 HC RX 250 WO HCPCS: Performed by: STUDENT IN AN ORGANIZED HEALTH CARE EDUCATION/TRAINING PROGRAM

## 2025-05-29 PROCEDURE — 6360000002 HC RX W HCPCS: Performed by: PHYSICIAN ASSISTANT

## 2025-05-29 PROCEDURE — 84300 ASSAY OF URINE SODIUM: CPT

## 2025-05-29 PROCEDURE — 93005 ELECTROCARDIOGRAM TRACING: CPT | Performed by: PHYSICIAN ASSISTANT

## 2025-05-29 PROCEDURE — 1200000000 HC SEMI PRIVATE

## 2025-05-29 RX ORDER — GLUCAGON 1 MG/ML
1 KIT INJECTION PRN
Status: DISCONTINUED | OUTPATIENT
Start: 2025-05-29 | End: 2025-05-30 | Stop reason: HOSPADM

## 2025-05-29 RX ORDER — ACETAMINOPHEN 325 MG/1
650 TABLET ORAL EVERY 6 HOURS PRN
Status: DISCONTINUED | OUTPATIENT
Start: 2025-05-29 | End: 2025-05-30 | Stop reason: HOSPADM

## 2025-05-29 RX ORDER — ACETAMINOPHEN 650 MG/1
650 SUPPOSITORY RECTAL EVERY 6 HOURS PRN
Status: DISCONTINUED | OUTPATIENT
Start: 2025-05-29 | End: 2025-05-30 | Stop reason: HOSPADM

## 2025-05-29 RX ORDER — ALBUTEROL SULFATE 0.83 MG/ML
10 SOLUTION RESPIRATORY (INHALATION) ONCE
Status: COMPLETED | OUTPATIENT
Start: 2025-05-29 | End: 2025-05-29

## 2025-05-29 RX ORDER — 0.9 % SODIUM CHLORIDE 0.9 %
500 INTRAVENOUS SOLUTION INTRAVENOUS ONCE
Status: DISCONTINUED | OUTPATIENT
Start: 2025-05-29 | End: 2025-05-29

## 2025-05-29 RX ORDER — DIPHENHYDRAMINE HCL 25 MG
25 TABLET ORAL ONCE
Status: COMPLETED | OUTPATIENT
Start: 2025-05-29 | End: 2025-05-29

## 2025-05-29 RX ORDER — LISINOPRIL 20 MG/1
40 TABLET ORAL NIGHTLY
Status: DISCONTINUED | OUTPATIENT
Start: 2025-05-29 | End: 2025-05-29

## 2025-05-29 RX ORDER — SODIUM CHLORIDE 0.9 % (FLUSH) 0.9 %
5-40 SYRINGE (ML) INJECTION PRN
Status: DISCONTINUED | OUTPATIENT
Start: 2025-05-29 | End: 2025-05-30 | Stop reason: HOSPADM

## 2025-05-29 RX ORDER — CALCIUM GLUCONATE 20 MG/ML
1000 INJECTION, SOLUTION INTRAVENOUS ONCE
Status: COMPLETED | OUTPATIENT
Start: 2025-05-29 | End: 2025-05-29

## 2025-05-29 RX ORDER — SODIUM CHLORIDE 9 MG/ML
INJECTION, SOLUTION INTRAVENOUS CONTINUOUS
Status: DISCONTINUED | OUTPATIENT
Start: 2025-05-29 | End: 2025-05-29

## 2025-05-29 RX ORDER — TROSPIUM CHLORIDE 20 MG/1
20 TABLET, FILM COATED ORAL DAILY
Status: DISCONTINUED | OUTPATIENT
Start: 2025-05-29 | End: 2025-05-30 | Stop reason: HOSPADM

## 2025-05-29 RX ORDER — ONDANSETRON 2 MG/ML
4 INJECTION INTRAMUSCULAR; INTRAVENOUS EVERY 6 HOURS PRN
Status: DISCONTINUED | OUTPATIENT
Start: 2025-05-29 | End: 2025-05-30 | Stop reason: HOSPADM

## 2025-05-29 RX ORDER — INDOMETHACIN 25 MG/1
50 CAPSULE ORAL ONCE
Status: COMPLETED | OUTPATIENT
Start: 2025-05-29 | End: 2025-05-29

## 2025-05-29 RX ORDER — SODIUM CHLORIDE 0.9 % (FLUSH) 0.9 %
5-40 SYRINGE (ML) INJECTION EVERY 12 HOURS SCHEDULED
Status: DISCONTINUED | OUTPATIENT
Start: 2025-05-29 | End: 2025-05-30 | Stop reason: HOSPADM

## 2025-05-29 RX ORDER — SODIUM CHLORIDE 9 MG/ML
INJECTION, SOLUTION INTRAVENOUS PRN
Status: DISCONTINUED | OUTPATIENT
Start: 2025-05-29 | End: 2025-05-30 | Stop reason: HOSPADM

## 2025-05-29 RX ORDER — ONDANSETRON 4 MG/1
4 TABLET, ORALLY DISINTEGRATING ORAL EVERY 8 HOURS PRN
Status: DISCONTINUED | OUTPATIENT
Start: 2025-05-29 | End: 2025-05-30 | Stop reason: HOSPADM

## 2025-05-29 RX ORDER — 0.9 % SODIUM CHLORIDE 0.9 %
1000 INTRAVENOUS SOLUTION INTRAVENOUS ONCE
Status: COMPLETED | OUTPATIENT
Start: 2025-05-29 | End: 2025-05-29

## 2025-05-29 RX ORDER — MECLIZINE HCL 12.5 MG 12.5 MG/1
12.5 TABLET ORAL 3 TIMES DAILY PRN
Status: DISCONTINUED | OUTPATIENT
Start: 2025-05-29 | End: 2025-05-30 | Stop reason: HOSPADM

## 2025-05-29 RX ORDER — DEXTROSE MONOHYDRATE 100 MG/ML
INJECTION, SOLUTION INTRAVENOUS CONTINUOUS PRN
Status: DISCONTINUED | OUTPATIENT
Start: 2025-05-29 | End: 2025-05-30 | Stop reason: HOSPADM

## 2025-05-29 RX ORDER — POLYETHYLENE GLYCOL 3350 17 G/17G
17 POWDER, FOR SOLUTION ORAL DAILY PRN
Status: DISCONTINUED | OUTPATIENT
Start: 2025-05-29 | End: 2025-05-30 | Stop reason: HOSPADM

## 2025-05-29 RX ORDER — PANTOPRAZOLE SODIUM 40 MG/1
40 TABLET, DELAYED RELEASE ORAL
Status: DISCONTINUED | OUTPATIENT
Start: 2025-05-29 | End: 2025-05-30 | Stop reason: HOSPADM

## 2025-05-29 RX ORDER — HYDROCODONE BITARTRATE AND ACETAMINOPHEN 5; 325 MG/1; MG/1
1 TABLET ORAL EVERY 8 HOURS PRN
Refills: 0 | Status: DISCONTINUED | OUTPATIENT
Start: 2025-05-29 | End: 2025-05-30 | Stop reason: HOSPADM

## 2025-05-29 RX ADMIN — APIXABAN 5 MG: 5 TABLET, FILM COATED ORAL at 09:22

## 2025-05-29 RX ADMIN — ACETAMINOPHEN 650 MG: 325 TABLET ORAL at 13:52

## 2025-05-29 RX ADMIN — SODIUM CHLORIDE 1000 ML: 0.9 INJECTION, SOLUTION INTRAVENOUS at 03:39

## 2025-05-29 RX ADMIN — Medication 10 ML: at 09:30

## 2025-05-29 RX ADMIN — MECLIZINE 12.5 MG: 12.5 TABLET ORAL at 10:00

## 2025-05-29 RX ADMIN — MELATONIN TAB 3 MG 3 MG: 3 TAB at 20:30

## 2025-05-29 RX ADMIN — INSULIN HUMAN 5 UNITS: 100 INJECTION, SOLUTION PARENTERAL at 03:23

## 2025-05-29 RX ADMIN — DIPHENHYDRAMINE HYDROCHLORIDE 25 MG: 25 TABLET ORAL at 20:30

## 2025-05-29 RX ADMIN — PANTOPRAZOLE SODIUM 40 MG: 40 TABLET, DELAYED RELEASE ORAL at 09:31

## 2025-05-29 RX ADMIN — SODIUM BICARBONATE 50 MEQ: 84 INJECTION INTRAVENOUS at 02:55

## 2025-05-29 RX ADMIN — DEXTROSE 250 ML: 10 SOLUTION INTRAVENOUS at 03:17

## 2025-05-29 RX ADMIN — ACETAMINOPHEN 650 MG: 325 TABLET ORAL at 04:43

## 2025-05-29 RX ADMIN — ALBUTEROL SULFATE 10 MG: 2.5 SOLUTION RESPIRATORY (INHALATION) at 03:25

## 2025-05-29 RX ADMIN — MELATONIN TAB 3 MG 6 MG: 3 TAB at 22:51

## 2025-05-29 RX ADMIN — SODIUM ZIRCONIUM CYCLOSILICATE 10 G: 10 POWDER, FOR SUSPENSION ORAL at 03:17

## 2025-05-29 RX ADMIN — APIXABAN 5 MG: 5 TABLET, FILM COATED ORAL at 20:30

## 2025-05-29 RX ADMIN — HYDROCODONE BITARTRATE AND ACETAMINOPHEN 1 TABLET: 5; 325 TABLET ORAL at 09:22

## 2025-05-29 RX ADMIN — SODIUM BICARBONATE: 84 INJECTION, SOLUTION INTRAVENOUS at 11:51

## 2025-05-29 RX ADMIN — TROSPIUM CHLORIDE 20 MG: 20 TABLET, FILM COATED ORAL at 20:30

## 2025-05-29 RX ADMIN — PANTOPRAZOLE SODIUM 40 MG: 40 TABLET, DELAYED RELEASE ORAL at 17:50

## 2025-05-29 RX ADMIN — CALCIUM GLUCONATE 1000 MG: 20 INJECTION, SOLUTION INTRAVENOUS at 03:06

## 2025-05-29 RX ADMIN — SODIUM CHLORIDE: 0.9 INJECTION, SOLUTION INTRAVENOUS at 05:19

## 2025-05-29 ASSESSMENT — ENCOUNTER SYMPTOMS
DIARRHEA: 0
ABDOMINAL DISTENTION: 0
PHOTOPHOBIA: 0
CHEST TIGHTNESS: 0
VOMITING: 0
BACK PAIN: 0
NAUSEA: 0
COUGH: 0
COLOR CHANGE: 0
ABDOMINAL PAIN: 0
RESPIRATORY NEGATIVE: 1
SHORTNESS OF BREATH: 0
CONSTIPATION: 0

## 2025-05-29 ASSESSMENT — LIFESTYLE VARIABLES
HOW OFTEN DO YOU HAVE A DRINK CONTAINING ALCOHOL: NEVER
HOW MANY STANDARD DRINKS CONTAINING ALCOHOL DO YOU HAVE ON A TYPICAL DAY: PATIENT DOES NOT DRINK

## 2025-05-29 ASSESSMENT — PAIN SCALES - GENERAL
PAINLEVEL_OUTOF10: 3
PAINLEVEL_OUTOF10: 0
PAINLEVEL_OUTOF10: 3
PAINLEVEL_OUTOF10: 3
PAINLEVEL_OUTOF10: 0
PAINLEVEL_OUTOF10: 5

## 2025-05-29 ASSESSMENT — PAIN DESCRIPTION - ORIENTATION: ORIENTATION: MID

## 2025-05-29 ASSESSMENT — PAIN DESCRIPTION - ONSET: ONSET: ON-GOING

## 2025-05-29 ASSESSMENT — PAIN DESCRIPTION - FREQUENCY: FREQUENCY: INTERMITTENT

## 2025-05-29 ASSESSMENT — PAIN - FUNCTIONAL ASSESSMENT
PAIN_FUNCTIONAL_ASSESSMENT: ACTIVITIES ARE NOT PREVENTED
PAIN_FUNCTIONAL_ASSESSMENT: NONE - DENIES PAIN

## 2025-05-29 ASSESSMENT — PAIN DESCRIPTION - PAIN TYPE: TYPE: ACUTE PAIN

## 2025-05-29 ASSESSMENT — PAIN DESCRIPTION - DESCRIPTORS
DESCRIPTORS: ITCHING
DESCRIPTORS: ACHING

## 2025-05-29 ASSESSMENT — PAIN DESCRIPTION - LOCATION
LOCATION: THROAT
LOCATION: HEAD

## 2025-05-29 NOTE — PROGRESS NOTES
Patient is complaining of seasonal allergies and a slight scratchy throat. Sent Perfect Serve message to hospitalist. See new orders for phenol spray and also a one time dose of Benadryl. Hospitalist does not think a swab for respiratory viruses is needed at this time and feels is due to allergies. Constance Fuentes RN BSN

## 2025-05-29 NOTE — ED PROVIDER NOTES
[facility] EMERGENCY DEPARTMENT  EMERGENCY DEPARTMENT ENCOUNTER        Pt Name: Nimisha Joshi  MRN: [id number]  Birthdate 1945  Date of evaluation: 5/29/2025  Provider: SARAHY Short  PCP: Giulia Hunt DO  Note Started: 2:44 AM EDT 5/29/25       I have seen and evaluated this patient with my supervising physician Luc Goff MD.      CHIEF COMPLAINT       Chief Complaint   Patient presents with    Abnormal Lab     Pt to ED with family, was sent by provider for abnormal potassium.       HISTORY OF PRESENT ILLNESS: 1 or more Elements     History From: Patient  Limitations to history : None    Nimisha Joshi is a 79 y.o. female with past medical history of conductive hearing loss and atrial fibrillation anticoagulated on Eliquis who presents ED with complaint of abnormal labs.  Patient was going to have outpatient scan.  She had routine lab work drawn.  Lab work came back with potassium over 6.  She was told to come to the emergency department for further evaluation and treatment.  No complaints at this time.  No history of kidney problems in the past.  No changes in urine output.  No changes in bowel movements.  No nausea or vomiting.  No chest pain or shortness of breath.  No leg swelling    Nursing Notes were all reviewed and agreed with or any disagreements were addressed in the HPI.    REVIEW OF SYSTEMS :      Review of Systems   Constitutional:  Negative for activity change, appetite change, chills, diaphoresis, fatigue and fever.   Eyes:  Negative for photophobia and visual disturbance.   Respiratory: Negative.  Negative for cough, chest tightness and shortness of breath.    Cardiovascular: Negative.  Negative for chest pain, palpitations and leg swelling.   Gastrointestinal:  Negative for abdominal distention, abdominal pain, constipation, diarrhea, nausea and vomiting.   Genitourinary:  Negative for decreased urine volume, difficulty urinating, dysuria, flank pain, frequency,

## 2025-05-29 NOTE — ED NOTES
Patient Name: Nimisha Joshi  : 1945 79 y.o.  MRN: [id number]  ED Room #: ED-0021/21     Chief complaint:   Chief Complaint   Patient presents with    Abnormal Lab     Pt to ED with family, was sent by provider for abnormal potassium.     Hospital Problem/Diagnosis:   Hospital Problems           Last Modified POA    * (Principal) PAOLA (acute kidney injury) 2025 Yes         O2 Flow Rate:O2 Device: None (Room air)   (if applicable)  Cardiac Rhythm:   (if applicable)  Active LDA's:   Peripheral IV 25 Right Antecubital (Active)   Site Assessment Clean, dry & intact;Clean;Dry;Intact 25   Line Status Blood return noted;Flushed;Normal saline locked 25   Line Care Cap changed 25   Phlebitis Assessment No symptoms 25   Infiltration Assessment 0 25   Alcohol Cap Used No 25   Dressing Status New dressing applied;Clean, dry & intact;Clean;Dry;Intact 25   Dressing Type Transparent 25   Dressing Intervention New 25            How does patient ambulate? Unknown, did not assess in the Emergency Department    2. How does patient take pills? Whole with Water    3. Is patient alert? Alert    4. Is patient oriented? To Person, To Place, To Time, To Situation, and Follows Commands    5.   Patient arrived from:  home  Facility Name: ___________________________________________    6. If patient is disoriented or from a Skill Nursing Facility has family been notified of admission?     7. Patient belongings? Belongings: Clothing    Disposition of belongings? Kept with Patient     8. Any specific patient or family belongings/needs/dynamics?   a.     9. Miscellaneous comments/pending orders?  a.      If there are any additional questions please reach out to the Emergency Department.

## 2025-05-29 NOTE — CARE COORDINATION
Discharge Planning Note:    Chart reviewed and it appears that patient has minimal needs for discharge at this time. Risk Score N/A in OBS %     Primary Care Physician is Giulia Hunt DO    Primary insurance is [employer] medicare    Please notify case management if any discharge needs are identified.      Case management will continue to follow progress and update discharge plan as needed.    Electronically signed by Eddie Levin on 5/29/2025 at 7:55 AM

## 2025-05-29 NOTE — PROGRESS NOTES
Received a call from the lab that patient”s k is 6.5.  Called ED at [facility] and recommended  for the patient to be seen. The patient was informed and she will be heading to [facility] ED.

## 2025-05-29 NOTE — H&P
History and Physical      Name:  Nimisha Joshi DOB/Age/Sex: 1945  (79 y.o. female)   MRN & CSN:  [id number] & [id number] Encounter Date/Time: 2025 2:46 AM EDT   Location:  [facility] PCP: Giulia Hunt DO       Hospital Day: 1    Assessment and Plan:     Patient is a 79 y.o. female who presented with Abnormal Lab (Pt to ED with family, was sent by provider for abnormal potassium.)    PAOLA  -Cr 2.2 on admit (baseline ~0.6)  -Likely pre-renal 2/2 decreased PO intake, ACE-I    -IVF  -Avoid nephrotoxin  -Monitor I/O  -Bladder scan     Hyperkalemia   -Patient presents with potassium of 5.7  -Received calcium gluconate, dextrose and insulin, sodium bicarb, and 10 mg of Lokelma  -Repeat potassium trending down 5.6  -Likely cause is PAOLA and medications     -Repeat Lokelma prn  -Repeat labs    Afib  - Continue eliquis     HTN  -Hold Lisinopril due to PAOLA, terazosin due to borderline hypotension     GERD  -Continue PPI      Checklist:  Advanced directive: full  Diet: cardiac   DVT ppx: eliquis      Disposition: place in observation.  Estimated discharge: 2 day(s).  Current living situation: home.  Expected disposition: home.    Spoke with ED provider who recommended admission for the patient and I agree with that plan.  Personally reviewed lab studies and imaging.  EKG interpreted personally and results as stated above.  Imaging that was interpreted personally and results as stated above.    History of Present Illness:     Chief Complaint:    Chief Complaint   Patient presents with    Abnormal Lab     Pt to ED with family, was sent by provider for abnormal potassium.       Patient is a 79 y.o. female with a PMHx as above who presented to the ED with abnormal labs. Patient reports she has chronic dizziness, managed by neurology and required therapy. She reports she also has chronic back pain and was switched from norco to percocet, for which made her very somnolent and as a result has decreased PO intake the last

## 2025-05-29 NOTE — PROGRESS NOTES
Pt arrived to room 3369 via stretcher from ED. Assisted to bed with SBA x1 and cane. Alert and oriented X 4. VS, head to toe, and skin assessment completed. Fall precautions in place. Pt oriented to staff, room, and call light. No concerns at this time. Electronically signed by Feilsa Hawkins RN on 5/29/2025 at 0445

## 2025-05-29 NOTE — ED PROVIDER NOTES
I have personally performed a face to face diagnostic evaluation on this patient. I have fully participated in the care of this patient I personally saw the patient and performed a substantive portion of the visit including all aspects of the medical decision making.  I have reviewed and agree with all pertinent clinical information including history, physical exam, diagnostic tests, and the plan.      HPI: Nimisha Joshi presented with abnormal labs.  Patient was supposed to have an outpatient CT scan done by neurology for chronic dizziness and was found to have renal failure and elevated potassium.  Patient has no other acute symptoms at this time  Chief Complaint   Patient presents with    Abnormal Lab     Pt to ED with family, was sent by provider for abnormal potassium.      Review of Systems: See VIJAY note  Vital Signs: BP 97/62   Pulse 73   Temp 98.4 °F (36.9 °C) (Oral)   Resp 18   SpO2 97%     Alert 79 y.o. female who does not appear toxic or acutely ill  HENT: Atraumatic  Neck: Grossly normal ROM  Chest/Lungs: respiratory effort normal   Skin: No palor or diaphoresis    Medical Decision Making and Plan:  Pertinent Labs & Imaging studies reviewed. (See VIJAY chart for details)  I agree with VIJAY assessment and plan.  79-year-old female presents for abnormal outpatient labs found to have new renal dysfunction previous GFR in 2024 was normal patient's GFR in outpatient labs 18 repeat today here is 22.  Significant elevated BUN and creatinine patient is otherwise asymptomatic found to have an outpatient potassium of 6.1 nonhemolyzed here is 5.7 hemolyzed and repeat is still 5.6.  Given patient's new worsening renal function will plan on admission we will give treatment for hyperkalemia will plan on admission to the hospital service for nephrology evaluation.  Vital signs otherwise stable at this time.    I personally saw the patient and independently provided 0 minutes of nonconcurrent critical care out of the  total shared critical care time provided    This includes multiple reevaluations, vital sign monitoring, pulse oximetry monitoring, telemetry monitoring, clinical response to the IV medications, reviewing the nursing notes, consultation time, dictation/documentation time, and interpretation of the labwork. (This time excludes time spent performing procedures).      EKG: All EKG's are interpreted by the Emergency Department Physician who either signs or Co-signs this chart in the absence of a cardiologist.    EKG Interpretation    Interpreted by emergency department physician    Rhythm: normal sinus   Rate: normal  Axis: left  Ectopy: none  Conduction: normal  ST Segments: nonspecific changes  T Waves: non specific changes  Q Waves: none    Clinical Impression: Normal sinus rhythm with left axis deviation with nonspecific ST and T wave changes.  LA interval normal normal QRS duration normal QT QTc.  Patient does have new inferior T wave changes as compared to previous EKG dated February 24, 2024.  Interpreted by myself.    MD Denver Aguilar Stephen W, MD  05/29/25 3554

## 2025-05-29 NOTE — PROGRESS NOTES
Started sodium bicarbonate IVF @ 100cc/hr also patient states she takes three different blood pressure medications at night at home. Patient called her physician's office and reconciled with terazosin 2 mg oral twice daily along with lisinopril listed. Will update hospitalist on home blood pressure meds. Constance Fuentes RN BSN

## 2025-05-29 NOTE — PROGRESS NOTES
Nephrology consult received - full consult note to follow. Chart reviewed. DW. Dr. Quigley     Stop NS   start Bicarb   Stop Lisinopril     Thank you for allowing us to participate in this patient’s care. Please do not hesitate to contact, if any questions or concerns. We will follow along with you.     Albert Garces MD  [facility]   [phone]   Or [address] [employer].

## 2025-05-29 NOTE — PLAN OF CARE
Problem: Discharge Planning  Goal: Discharge to home or other facility with appropriate resources  Outcome: Progressing  Flowsheets (Taken 5/29/2025 0421)  Discharge to home or other facility with appropriate resources: Identify barriers to discharge with patient and caregiver     Problem: Safety - Adult  Goal: Free from fall injury  Outcome: Progressing     Problem: ABCDS Injury Assessment  Goal: Absence of physical injury  Outcome: Progressing     Problem: Cardiovascular - Adult  Goal: Maintains optimal cardiac output and hemodynamic stability  Outcome: Progressing  Flowsheets (Taken 5/29/2025 0421)  Maintains optimal cardiac output and hemodynamic stability:   Monitor urine output and notify Licensed Independent Practitioner for values outside of normal range   Monitor blood pressure and heart rate   Assess for signs of decreased cardiac output   Administer fluid and/or volume expanders as ordered  Goal: Absence of cardiac dysrhythmias or at baseline  Outcome: Progressing  Flowsheets (Taken 5/29/2025 0421)  Absence of cardiac dysrhythmias or at baseline:   Monitor cardiac rate and rhythm   Assess for signs of decreased cardiac output   Administer antiarrhythmia medication and electrolyte replacement as ordered     Problem: Musculoskeletal - Adult  Goal: Return mobility to safest level of function  Outcome: Progressing  Flowsheets (Taken 5/29/2025 0421)  Return Mobility to Safest Level of Function:   Assist with transfers and ambulation using safe patient handling equipment as needed   Assess patient stability and activity tolerance for standing, transferring and ambulating with or without assistive devices   Obtain physical therapy/occupational therapy consults as needed   Instruct patient/family in ordered activity level  Goal: Maintain proper alignment of affected body part  Outcome: Progressing  Goal: Return ADL status to a safe level of function  Outcome: Progressing  Flowsheets (Taken 5/29/2025  type and severity of pain and evaluate response

## 2025-05-29 NOTE — CONSULTS
MD Albert Coelho MD Aldo Estella                  Office: [phone]                      Fax: [phone]             1bib                   NEPHROLOGY INITIAL CONSULT NOTE:     PATIENT NAME: Nimisha Joshi  : 1945  MRN: [id number]  REASON FOR CONSULT: For evaluation and management of Acute Kidney Injury , acidosis . (My recommendations will be communicated by way of shared medical record.) - dr morfin       RECOMMENDATIONS:   Stop NS   Start Bicarb for mIVF   Stop Lisinopril   Refer to orders   K better w/ medical mx    check UA w/ microscopy, urine lytes,  monitor PVR w/ bladder scan for ackerman insertion need.    no need for dialysis,     at higher risk for decompensation, needing closer monitoring.    D/C plan from renal stand point:- likely ~ 1-2 days  +   -: follow up w/ us at  [facility]  in 2-3 weeks after d/c. (I updated our information in discharge follow up providers' list.)     Thank you for allowing me to participate in this patient's care. Please do not hesitate to contact me anytime. We will follow along with you.       Albert Garces MD,  [facility]  Office: [phone] or [address] [employer]  Fax: [phone]      =======================================================================================      IMPRESSION:       Admitted on:  2025  1:30 AM   For:    Hospital Problems           Last Modified POA    * (Principal) PAOLA (acute kidney injury) 2025 Yes       PAOLA : on admission   H/O CKD stage 1-2   - BL S.Cr.: 0.6 ,  BL eGFR 90s, last known      - outpatient nephrologist -none   - on admission S.Cr.: 2.6   - Etiology of PAOLA - likely pre-renal       Associated problems:   - Volume status: hypo volemic  - HTN : no need for tight control    - Na:  wnl   - Azotemia: pre-renal   - Electrolytes:  hyperkalemia - 6.1  - Acid-Base: non- AGMA    - Anemia: of chronic disease: not at goal     Other major  problems: Management per primary and other consulting teams.      Patient Active Problem List   Diagnosis    Abnormal mammogram with microcalcification    Cystic fibroadenosis of breast    Atypical ductal hyperplasia of breast    Conductive hearing loss in left ear    Impacted cerumen of left ear    Asymmetrical right sensorineural hearing loss    Ear pressure, right    Bilateral sensorineural hearing loss    Sudden idiopathic hearing loss of right ear with restricted hearing of left ear    Eustachian tube dysfunction, bilateral    Neoplasm of uncertain behavior of nasopharynx    Impacted cerumen of both ears    Conductive hearing loss of both ears    Iron deficiency anemia secondary to blood loss (chronic)    Vitamin B12 deficiency anemia    Chronic GI bleeding    Syncope and collapse    Multiple closed fractures of ribs of right side    Benign paroxysmal vertigo of both ears    PAF (paroxysmal atrial fibrillation) (HCC)    Dizziness    PAOLA (acute kidney injury)       : other supportive care :   - Check frequent renal function panel with electrolytes-phosphorus  - Strict monitoring of I/Os, daily weight, as able to  - Renal feeds/diet  - Current medications reviewed.  As needed for my evaluation   - Nephrotoxic medications have been discontinued.    - Dose adjusted and appropriate.     - Dose meds for eGFR <15 mL/min/1.73m2 during PAOLA    - Avoid heavy opioids due to renal failure - may use very low dose dilaudid / fentanyl with close monitoring of CNS and respiratory depression.    -Pharmacy assistant.       Please refer to the orders.   Medical decision making- High Complexity. Multiple complex problems.  Discussed with patient and  treatment team,  attending Keli Quigley MD    Severally ill, at risk of impending organ failure needing  close level of care/monitoring.   Time spent 38 minutes that included face-to-face meeting/discussion with patient, patient's family - as available, and treatment team

## 2025-05-30 VITALS
RESPIRATION RATE: 16 BRPM | BODY MASS INDEX: 33.06 KG/M2 | SYSTOLIC BLOOD PRESSURE: 131 MMHG | TEMPERATURE: 98.6 F | WEIGHT: 164 LBS | HEIGHT: 59 IN | HEART RATE: 69 BPM | DIASTOLIC BLOOD PRESSURE: 80 MMHG | OXYGEN SATURATION: 94 %

## 2025-05-30 LAB
ANION GAP SERPL CALCULATED.3IONS-SCNC: 7 MMOL/L (ref 3–16)
BASOPHILS # BLD: 0.1 K/UL (ref 0–0.2)
BASOPHILS NFR BLD: 0.8 %
BUN SERPL-MCNC: 24 MG/DL (ref 7–20)
CALCIUM SERPL-MCNC: 9.1 MG/DL (ref 8.3–10.6)
CHLORIDE SERPL-SCNC: 112 MMOL/L (ref 99–110)
CO2 SERPL-SCNC: 24 MMOL/L (ref 21–32)
CREAT SERPL-MCNC: 1.1 MG/DL (ref 0.6–1.2)
DEPRECATED RDW RBC AUTO: 13.9 % (ref 12.4–15.4)
EKG ATRIAL RATE: 67 BPM
EKG DIAGNOSIS: NORMAL
EKG P AXIS: 10 DEGREES
EKG P-R INTERVAL: 156 MS
EKG Q-T INTERVAL: 422 MS
EKG QRS DURATION: 102 MS
EKG QTC CALCULATION (BAZETT): 445 MS
EKG R AXIS: -32 DEGREES
EKG T AXIS: -5 DEGREES
EKG VENTRICULAR RATE: 67 BPM
EOSINOPHIL # BLD: 0.2 K/UL (ref 0–0.6)
EOSINOPHIL NFR BLD: 2.9 %
GFR SERPLBLD CREATININE-BSD FMLA CKD-EPI: 51 ML/MIN/{1.73_M2}
GLUCOSE BLD-MCNC: 123 MG/DL (ref 70–99)
GLUCOSE BLD-MCNC: 126 MG/DL (ref 70–99)
GLUCOSE SERPL-MCNC: 115 MG/DL (ref 70–99)
HCT VFR BLD AUTO: 28 % (ref 36–48)
HGB BLD-MCNC: 9.2 G/DL (ref 12–16)
LYMPHOCYTES # BLD: 1.8 K/UL (ref 1–5.1)
LYMPHOCYTES NFR BLD: 26.8 %
MCH RBC QN AUTO: 29.1 PG (ref 26–34)
MCHC RBC AUTO-ENTMCNC: 32.6 G/DL (ref 31–36)
MCV RBC AUTO: 89.1 FL (ref 80–100)
MONOCYTES # BLD: 0.6 K/UL (ref 0–1.3)
MONOCYTES NFR BLD: 9.5 %
NEUTROPHILS # BLD: 4 K/UL (ref 1.7–7.7)
NEUTROPHILS NFR BLD: 60 %
PERFORMED ON: ABNORMAL
PERFORMED ON: ABNORMAL
PLATELET # BLD AUTO: 225 K/UL (ref 135–450)
PMV BLD AUTO: 8.7 FL (ref 5–10.5)
POTASSIUM SERPL-SCNC: 4.5 MMOL/L (ref 3.5–5.1)
RBC # BLD AUTO: 3.15 M/UL (ref 4–5.2)
SODIUM SERPL-SCNC: 143 MMOL/L (ref 136–145)
WBC # BLD AUTO: 6.6 K/UL (ref 4–11)

## 2025-05-30 PROCEDURE — 36415 COLL VENOUS BLD VENIPUNCTURE: CPT

## 2025-05-30 PROCEDURE — 97530 THERAPEUTIC ACTIVITIES: CPT

## 2025-05-30 PROCEDURE — 97165 OT EVAL LOW COMPLEX 30 MIN: CPT

## 2025-05-30 PROCEDURE — 93010 ELECTROCARDIOGRAM REPORT: CPT | Performed by: INTERNAL MEDICINE

## 2025-05-30 PROCEDURE — 97535 SELF CARE MNGMENT TRAINING: CPT

## 2025-05-30 PROCEDURE — 85025 COMPLETE CBC W/AUTO DIFF WBC: CPT

## 2025-05-30 PROCEDURE — 6370000000 HC RX 637 (ALT 250 FOR IP): Performed by: STUDENT IN AN ORGANIZED HEALTH CARE EDUCATION/TRAINING PROGRAM

## 2025-05-30 PROCEDURE — 97161 PT EVAL LOW COMPLEX 20 MIN: CPT

## 2025-05-30 PROCEDURE — 80048 BASIC METABOLIC PNL TOTAL CA: CPT

## 2025-05-30 RX ADMIN — APIXABAN 5 MG: 5 TABLET, FILM COATED ORAL at 09:26

## 2025-05-30 ASSESSMENT — PAIN SCALES - GENERAL: PAINLEVEL_OUTOF10: 0

## 2025-05-30 NOTE — FLOWSHEET NOTE
Shift assessment completed. Pt denies pain or needs at this time. Pt states she has frequent urination due to overactive bladder. Educated pt on purewick and purewick placed. POC discussed with patient for the night and all questions answered.

## 2025-05-30 NOTE — DISCHARGE INSTRUCTIONS
Follow up with your PCP within 4-5 days of discharge.  Have PCP check blood pressure and start on BP medication if needed  Follow up with nephrology as instructed  Take all your medications as prescribed.

## 2025-05-30 NOTE — PLAN OF CARE
Problem: Discharge Planning  Goal: Discharge to home or other facility with appropriate resources  Outcome: Progressing  Flowsheets  Taken 5/29/2025 2050 by Antony Kemp RN  Discharge to home or other facility with appropriate resources: Identify discharge learning needs (meds, wound care, etc)  Problem: ABCDS Injury Assessment  Goal: Absence of physical injury  Outcome: Progressing  Flowsheets (Taken 5/29/2025 2050)  Absence of Physical Injury: Implement safety measures based on patient assessment     Problem: Cardiovascular - Adult  Goal: Absence of cardiac dysrhythmias or at baseline  Outcome: Progressing  Flowsheets (Taken 5/29/2025 2050)  Absence of cardiac dysrhythmias or at baseline: Monitor cardiac rate and rhythm     Problem: Musculoskeletal - Adult  Goal: Return mobility to safest level of function  Outcome: Progressing  Flowsheets (Taken 5/29/2025 2050)  Return Mobility to Safest Level of Function: Assess patient stability and activity tolerance for standing, transferring and ambulating with or without assistive devices     Problem: Metabolic/Fluid and Electrolytes - Adult  Goal: Hemodynamic stability and optimal renal function maintained  Outcome: Progressing  Flowsheets  Taken 5/29/2025 2050 by Antony Kemp RN  Hemodynamic stability and optimal renal function maintained: Monitor intake, output and patient weight  Problem: Pain  Goal: Verbalizes/displays adequate comfort level or baseline comfort level  Outcome: Progressing

## 2025-05-30 NOTE — PROGRESS NOTES
[facility] - Inpatient Rehabilitation Department   Phone: [phone]    Physical Therapy    [x] Initial Evaluation            [] Daily Treatment Note         [x] Discharge Summary      Patient: Nimisha Joshi   : 1945   MRN: [id number]   Date of Service:  2025  Admitting Diagnosis: PAOLA (acute kidney injury)  Current Admission Summary: Patient admitted  after outpatient labs indicated hypokalemia (K 6.5). Patient has history of chronic dizziness (intermittent).   Past Medical History:  has a past medical history of GERD (gastroesophageal reflux disease), Hyperlipidemia, and Hypertension.  Past Surgical History:  has a past surgical history that includes Hysterectomy; Breast surgery (Right); and Cataract removal (Left, 2025).  Discharge Recommendations: Nimisha Joshi scored a 23/24 on the AM-PAC short mobility form.  At this time, no further PT is recommended upon discharge due to patient at independent level.  Recommend patient returns to prior setting with prior services.    DME Required For Discharge: No new DME required  Precautions/Restrictions: high fall risk, up as tolerated  Positional Restrictions:no positional restrictions    Pre-Admission Information   Lives With: alone                     Type of Home: house  Home Layout: one level  Home Access:  1 small step to enter without rails   Bathroom Layout: walk in shower  Bathroom Equipment: grab bars in shower, built in shower seat, hand held shower head  Toilet Height: elevated height  Home Equipment: rolling walker, single point cane, alert button  Transfer Assistance: Independent without use of device  Ambulation Assistance:modified independent with use of SPC vs RW pending amount of back pain  ADL Assistance: independent with all ADL's  IADL Assistance: requires assistance with yard work, occasional assistance with cleaning when back pain is increased  Active :        [x] Yes                 [] No  Hand Dominance: []

## 2025-05-30 NOTE — PROGRESS NOTES
[facility] - Inpatient Rehabilitation Department   Phone: [phone]    Occupational Therapy    [x] Initial Evaluation            [] Daily Treatment Note         [x] Discharge Summary      Patient: Nimisha Joshi   : 1945   MRN: [id number]   Date of Service:  2025    Admitting Diagnosis:  PAOLA (acute kidney injury)  Current Admission Summary:   Abnormal Lab        Pt to ED with family, was sent by provider for abnormal potassium.         HISTORY OF PRESENT ILLNESS: 1 or more Elements      History From: Patient  Limitations to history : None     Nimisha Joshi is a 79 y.o. female with past medical history of conductive hearing loss and atrial fibrillation anticoagulated on Eliquis who presents ED with complaint of abnormal labs.  Patient was going to have outpatient scan.  She had routine lab work drawn.  Lab work came back with potassium over 6.  She was told to come to the emergency department for further evaluation and treatment.  No complaints at this time.  No history of kidney problems in the past.  No changes in urine output.  No changes in bowel movements.  No nausea or vomiting.  No chest pain or shortness of breath.  No leg swelling     Past Medical History:  has a past medical history of GERD (gastroesophageal reflux disease), Hyperlipidemia, and Hypertension.  Past Surgical History:  has a past surgical history that includes Hysterectomy; Breast surgery (Right); and Cataract removal (Left, 2025).    Discharge Recommendations: Nimisha Joshi scored a  /24 on the AM-PAC ADL Inpatient form.  At this time, no further OT is recommended upon discharge due to patient at independent level.  Recommend patient returns to prior setting with prior services.      DME Required For Discharge: No DME required    Precautions/Restrictions: high fall risk, up as tolerated  Positional Restrictions:no positional restrictions      Pre-Admission Information   Lives With: alone                     Type of      Electronically Signed By: Molly Alfaro, OTR/L [id number]

## 2025-05-30 NOTE — PROGRESS NOTES
MD Albert Coelho MD Aldo Estella, DO                 Office: [phone]                      Fax: [phone]             [employer]                   NEPHROLOGY INPATIENT PROGRESS NOTE:     PATIENT NAME: Nimisha Joshi  : 1945  MRN: [id number]  REASON FOR CONSULT: For evaluation and management of Acute Kidney Injury , acidosis . (My recommendations will be communicated by way of shared medical record.) -       RECOMMENDATIONS:   Stop NS     Stop Bicarb for mIVF   Stop Lisinopril  - hold on d-c  Refer to orders   K better w/ medical mx    \  no need for dialysis,     at higher risk for decompensation, needing closer monitoring.    D/C plan from renal stand point today   +   -: follow up w/ us at  [facility]  in 2-3 weeks after d/c. (I updated our information in discharge follow up providers' list.)     Thank you for allowing me to participate in this patient's care. Please do not hesitate to contact me anytime. We will follow along with you.       Albert Garces MD,  [facility]  Office: [phone] or [address] [employer]  Fax: [phone]      =======================================================================================      IMPRESSION:       Admitted on:  2025  1:30 AM   For:    Hospital Problems           Last Modified POA    * (Principal) PAOLA (acute kidney injury) 2025 Yes       PAOLA : on admission   H/O CKD stage 1-2   - BL S.Cr.: 0.6 ,  BL eGFR 90s, last known -     - outpatient nephrologist -none   - on admission S.Cr.: 2.6   - Etiology of PAOLA - likely pre-renal       Associated problems:   - Volume status: hypo volemic  - HTN : no need for tight control    - Na:  wnl   - Azotemia: pre-renal   - Electrolytes:  hyperkalemia - 6.1  - Acid-Base: non- AGMA    - Anemia: of chronic disease: not at goal     Other major problems: Management per primary and other consulting teams.      Patient Active Problem List          =======================================================================================  Please note that this chart entry has been generated using voice recognition software, mainly.  So please excuse brevity and/or typos.  While every effort and attempts have been made to ensure the accuracy of this automated transcription, some errors may have occurred; and certain words and phrases in transcription may not be entered as intended.  However, inadvertent computerized transcription errors may be present.  So please contact us if any clarification needed.

## 2025-05-30 NOTE — PLAN OF CARE
Problem: Discharge Planning  Goal: Discharge to home or other facility with appropriate resources  Outcome: Completed     Problem: Safety - Adult  Goal: Free from fall injury  Outcome: Completed     Problem: ABCDS Injury Assessment  Goal: Absence of physical injury  Outcome: Completed     Problem: Cardiovascular - Adult  Goal: Maintains optimal cardiac output and hemodynamic stability  Outcome: Completed  Goal: Absence of cardiac dysrhythmias or at baseline  Outcome: Completed     Problem: Musculoskeletal - Adult  Goal: Return mobility to safest level of function  Outcome: Completed  Goal: Maintain proper alignment of affected body part  Outcome: Completed  Goal: Return ADL status to a safe level of function  Outcome: Completed     Problem: Metabolic/Fluid and Electrolytes - Adult  Goal: Electrolytes maintained within normal limits  Outcome: Completed  Goal: Hemodynamic stability and optimal renal function maintained  Outcome: Completed  Goal: Glucose maintained within prescribed range  Outcome: Completed     Problem: Pain  Goal: Verbalizes/displays adequate comfort level or baseline comfort level  Outcome: Completed

## 2025-05-30 NOTE — CARE COORDINATION
05/30/25 0914   IMM Letter   IMM Letter given to Patient/Family/Significant other/Guardian/POA/by: IMM Given   IMM Letter date given: 05/30/25   IMM Letter time given: 0914

## 2025-05-30 NOTE — DISCHARGE SUMMARY
Hospital Medicine Discharge Summary    Patient ID: Nimisha Joshi      Patient's PCP: Giulia Hunt DO    Admit Date: 5/29/2025     Discharge Date: 5/30/2025     Admitting Physician: Mason Obregon MD     Discharge Physician: MASON OBREGON MD     Hospital Course: This 88-year-old female with PMHx of A-fib, hypertension and GERD admitted for PAOLA and hyperkalemia         PAOLA on CKD stage I-II; likely prerenal.  Resolved  Cr 2.2 on admission; (baseline ~0.6); trended down to 1.1 with IV fluids  Nephrology input appreciated.  Lisinopril DC'd on discharge     Hyperkalemia; resolved  S/p hyperkalemia cocktail and Lokelma.  Lisinopril held    Hypertension; BP remains soft during admission.  Lisinopril DC'd due to hyperkalemia and PAOLA  Patient was instructed follow with PCP for repeat BP check     Afib on Eliquis      GERD; PPI         Physical Exam Performed:     /80   Pulse 69   Temp 98.6 °F (37 °C) (Oral)   Resp 16   Ht 1.499 m (4' 11\")   Wt 74.4 kg (164 lb)   SpO2 94%   BMI 33.12 kg/m²     General appearance: No apparent distress, appears stated age and cooperative.  Cardiovascular: Regular rhythm, normal S1, S2. No murmur.   Respiratory: Clear to auscultation bilaterally, no wheeze or crackles.   GI: Abdomen soft, no tenderness, not distended, normal bowel sounds  Neurology: No gross deficit    Consults:     IP CONSULT TO NEPHROLOGY    Disposition: Home    Condition at Discharge: Stable     Discharge Instructions/Follow-up:   Follow up with your PCP within 4-5 days of discharge.  Have PCP check blood pressure and start on BP medication if needed  Follow up with nephrology as instructed  Take all your medications as prescribed.      Discharge Medications:     Discharge Medication List as of 5/30/2025 12:53 PM             Details   cyanocobalamin 1000 MCG/ML injection Inject 1 mL IM every 4 weeks for 6 months, Disp-6 mL, R-0Normal      albuterol sulfate HFA (PROVENTIL;VENTOLIN;PROAIR) 108 (90 Base)  not hesitate to contact me through [employer].

## 2025-06-10 ENCOUNTER — LAB (OUTPATIENT)
Dept: NEUROLOGY | Age: 80
End: 2025-06-10
Payer: MEDICARE

## 2025-06-10 DIAGNOSIS — E53.8 VITAMIN B12 DEFICIENCY DISEASE: Primary | ICD-10-CM

## 2025-06-10 PROCEDURE — 96372 THER/PROPH/DIAG INJ SC/IM: CPT | Performed by: PSYCHIATRY & NEUROLOGY

## 2025-06-10 RX ORDER — CYANOCOBALAMIN 1000 UG/ML
1000 INJECTION, SOLUTION INTRAMUSCULAR; SUBCUTANEOUS ONCE
Status: COMPLETED | OUTPATIENT
Start: 2025-06-10 | End: 2025-06-10

## 2025-06-10 RX ADMIN — CYANOCOBALAMIN 1000 MCG: 1000 INJECTION, SOLUTION INTRAMUSCULAR; SUBCUTANEOUS at 13:45

## 2025-06-10 NOTE — PROGRESS NOTES
Physician Progress Note      PATIENT:               SANA BLACK  CSN #:                  [id number]  :                       1945  ADMIT DATE:       2025 1:30 AM  DISCH DATE:        2025 2:00 PM  RESPONDING  PROVIDER #:        Keli Quigley MD          QUERY TEXT:    Based on your medical judgment, please clarify these findings and document if   any of the following are being evaluated and/or treated:    The clinical indicators include:  88-year-old female with PMHx of A-fib, hypertension and GERD    D/C Summary \"Afib on Eliquis\"    MAR documentation Eliquis 5mg po bid, supportive care  Options provided:  -- Secondary hypercoagulable state in a patient with atrial fibrillation  -- Other - I will add my own diagnosis  -- Disagree - Not applicable / Not valid  -- Disagree - Clinically unable to determine / Unknown  -- Refer to Clinical Documentation Reviewer    PROVIDER RESPONSE TEXT:    This patient has secondary hypercoagulable state in a patient with atrial   fibrillation.    Query created by: Ambreen Willett on 6/10/2025 10:11 AM      Electronically signed by:  Keli Quigley MD 6/10/2025 10:14 AM

## 2025-06-20 ENCOUNTER — ANESTHESIA EVENT (OUTPATIENT)
Dept: MRI IMAGING | Age: 80
End: 2025-06-20
Payer: MEDICARE

## 2025-06-20 ENCOUNTER — HOSPITAL ENCOUNTER (OUTPATIENT)
Dept: MRI IMAGING | Age: 80
Discharge: HOME | End: 2025-06-20
Attending: PSYCHIATRY & NEUROLOGY
Payer: MEDICARE

## 2025-06-20 ENCOUNTER — ANESTHESIA (OUTPATIENT)
Dept: MRI IMAGING | Age: 80
End: 2025-06-20
Payer: MEDICARE

## 2025-06-20 VITALS
OXYGEN SATURATION: 97 % | HEART RATE: 64 BPM | DIASTOLIC BLOOD PRESSURE: 76 MMHG | SYSTOLIC BLOOD PRESSURE: 150 MMHG | TEMPERATURE: 97.9 F | RESPIRATION RATE: 17 BRPM

## 2025-06-20 DIAGNOSIS — R42 DIZZINESS: ICD-10-CM

## 2025-06-20 LAB
CREAT SERPL-MCNC: 0.8 MG/DL (ref 0.6–1.2)
GFR SERPLBLD CREATININE-BSD FMLA CKD-EPI: 75 ML/MIN/{1.73_M2}

## 2025-06-20 PROCEDURE — 2500000003 HC RX 250 WO HCPCS: Performed by: NURSE ANESTHETIST, CERTIFIED REGISTERED

## 2025-06-20 PROCEDURE — A9579 GAD-BASE MR CONTRAST NOS,1ML: HCPCS | Performed by: PSYCHIATRY & NEUROLOGY

## 2025-06-20 PROCEDURE — 70553 MRI BRAIN STEM W/O & W/DYE: CPT

## 2025-06-20 PROCEDURE — 7100000001 HC PACU RECOVERY - ADDTL 15 MIN

## 2025-06-20 PROCEDURE — 82565 ASSAY OF CREATININE: CPT

## 2025-06-20 PROCEDURE — 7100000010 HC PHASE II RECOVERY - FIRST 15 MIN

## 2025-06-20 PROCEDURE — 7100000000 HC PACU RECOVERY - FIRST 15 MIN

## 2025-06-20 PROCEDURE — 2580000003 HC RX 258: Performed by: NURSE ANESTHETIST, CERTIFIED REGISTERED

## 2025-06-20 PROCEDURE — 36415 COLL VENOUS BLD VENIPUNCTURE: CPT

## 2025-06-20 PROCEDURE — 7100000011 HC PHASE II RECOVERY - ADDTL 15 MIN

## 2025-06-20 PROCEDURE — 3700000001 HC ADD 15 MINUTES (ANESTHESIA)

## 2025-06-20 PROCEDURE — 6360000002 HC RX W HCPCS: Performed by: NURSE ANESTHETIST, CERTIFIED REGISTERED

## 2025-06-20 PROCEDURE — 6360000004 HC RX CONTRAST MEDICATION: Performed by: PSYCHIATRY & NEUROLOGY

## 2025-06-20 PROCEDURE — 3700000000 HC ANESTHESIA ATTENDED CARE

## 2025-06-20 RX ORDER — FENTANYL CITRATE 50 UG/ML
INJECTION, SOLUTION INTRAMUSCULAR; INTRAVENOUS
Status: DISCONTINUED | OUTPATIENT
Start: 2025-06-20 | End: 2025-06-20 | Stop reason: SDUPTHER

## 2025-06-20 RX ORDER — SODIUM CHLORIDE 0.9 % (FLUSH) 0.9 %
5-40 SYRINGE (ML) INJECTION EVERY 12 HOURS SCHEDULED
Status: DISCONTINUED | OUTPATIENT
Start: 2025-06-20 | End: 2025-06-21 | Stop reason: HOSPADM

## 2025-06-20 RX ORDER — MIDAZOLAM HYDROCHLORIDE 1 MG/ML
INJECTION, SOLUTION INTRAMUSCULAR; INTRAVENOUS
Status: DISCONTINUED | OUTPATIENT
Start: 2025-06-20 | End: 2025-06-20 | Stop reason: SDUPTHER

## 2025-06-20 RX ORDER — SODIUM CHLORIDE 9 MG/ML
INJECTION, SOLUTION INTRAVENOUS
Status: DISCONTINUED | OUTPATIENT
Start: 2025-06-20 | End: 2025-06-20 | Stop reason: SDUPTHER

## 2025-06-20 RX ORDER — GADOTERIDOL 279.3 MG/ML
14 INJECTION INTRAVENOUS
Status: COMPLETED | OUTPATIENT
Start: 2025-06-20 | End: 2025-06-20

## 2025-06-20 RX ORDER — ONDANSETRON 2 MG/ML
4 INJECTION INTRAMUSCULAR; INTRAVENOUS
Status: DISCONTINUED | OUTPATIENT
Start: 2025-06-20 | End: 2025-06-21 | Stop reason: HOSPADM

## 2025-06-20 RX ORDER — SODIUM CHLORIDE 9 MG/ML
INJECTION, SOLUTION INTRAVENOUS PRN
Status: DISCONTINUED | OUTPATIENT
Start: 2025-06-20 | End: 2025-06-21 | Stop reason: HOSPADM

## 2025-06-20 RX ORDER — NALOXONE HYDROCHLORIDE 0.4 MG/ML
INJECTION, SOLUTION INTRAMUSCULAR; INTRAVENOUS; SUBCUTANEOUS PRN
Status: DISCONTINUED | OUTPATIENT
Start: 2025-06-20 | End: 2025-06-21 | Stop reason: HOSPADM

## 2025-06-20 RX ORDER — SODIUM CHLORIDE 0.9 % (FLUSH) 0.9 %
5-40 SYRINGE (ML) INJECTION PRN
Status: DISCONTINUED | OUTPATIENT
Start: 2025-06-20 | End: 2025-06-21 | Stop reason: HOSPADM

## 2025-06-20 RX ORDER — DEXMEDETOMIDINE HYDROCHLORIDE 100 UG/ML
INJECTION, SOLUTION INTRAVENOUS
Status: DISCONTINUED | OUTPATIENT
Start: 2025-06-20 | End: 2025-06-20 | Stop reason: SDUPTHER

## 2025-06-20 RX ADMIN — FENTANYL CITRATE 25 MCG: 50 INJECTION INTRAMUSCULAR; INTRAVENOUS at 09:38

## 2025-06-20 RX ADMIN — DEXMEDETOMIDINE 4 MCG: 100 INJECTION, SOLUTION INTRAVENOUS at 09:38

## 2025-06-20 RX ADMIN — GADOTERIDOL 14 ML: 279.3 INJECTION, SOLUTION INTRAVENOUS at 10:31

## 2025-06-20 RX ADMIN — MIDAZOLAM 1 MG: 1 INJECTION INTRAMUSCULAR; INTRAVENOUS at 09:48

## 2025-06-20 RX ADMIN — MIDAZOLAM 1 MG: 1 INJECTION INTRAMUSCULAR; INTRAVENOUS at 09:57

## 2025-06-20 RX ADMIN — FENTANYL CITRATE 25 MCG: 50 INJECTION INTRAMUSCULAR; INTRAVENOUS at 09:44

## 2025-06-20 RX ADMIN — FENTANYL CITRATE 25 MCG: 50 INJECTION INTRAMUSCULAR; INTRAVENOUS at 09:48

## 2025-06-20 RX ADMIN — FENTANYL CITRATE 25 MCG: 50 INJECTION INTRAMUSCULAR; INTRAVENOUS at 09:57

## 2025-06-20 RX ADMIN — DEXMEDETOMIDINE 4 MCG: 100 INJECTION, SOLUTION INTRAVENOUS at 09:48

## 2025-06-20 RX ADMIN — MIDAZOLAM 1 MG: 1 INJECTION INTRAMUSCULAR; INTRAVENOUS at 09:38

## 2025-06-20 RX ADMIN — MIDAZOLAM 1 MG: 1 INJECTION INTRAMUSCULAR; INTRAVENOUS at 09:44

## 2025-06-20 RX ADMIN — SODIUM CHLORIDE: 900 INJECTION INTRAVENOUS at 09:27

## 2025-06-20 RX ADMIN — DEXMEDETOMIDINE 4 MCG: 100 INJECTION, SOLUTION INTRAVENOUS at 09:57

## 2025-06-20 RX ADMIN — DEXMEDETOMIDINE 4 MCG: 100 INJECTION, SOLUTION INTRAVENOUS at 09:44

## 2025-06-20 ASSESSMENT — PAIN SCALES - GENERAL
PAINLEVEL_OUTOF10: 0
PAINLEVEL_OUTOF10: 0

## 2025-06-20 ASSESSMENT — PAIN - FUNCTIONAL ASSESSMENT: PAIN_FUNCTIONAL_ASSESSMENT: NONE - DENIES PAIN

## 2025-06-20 NOTE — PROGRESS NOTES
Patient admitted to PACU # 10 from MRI at 1039 post MRI.  Attached to PACU monitoring system and report received from anesthesia provider.  Patient was reported to be hemodynamically stable during procedure.  Patient drowsy on admission and denies pain.

## 2025-06-20 NOTE — ANESTHESIA POSTPROCEDURE EVALUATION
Department of Anesthesiology  Postprocedure Note    Patient: Nimisha Joshi  MRN: [id number]  YOB: 1945  Date of evaluation: 6/20/2025    Procedure Summary       Date: 06/20/25 Room / Location: [facility] [place]    Anesthesia Start: 0940 Anesthesia Stop: 1040    Procedure: MRI BRAIN W WO CONTRAST Diagnosis:       Dizziness      (posterior fossa changes or other abnormalities)    Scheduled Providers:  Responsible Provider: El Pickens MD    Anesthesia Type: MAC ASA Status: 3            Anesthesia Type: No value filed.    Esa Phase I: Esa Score: 10    Esa Phase II: Esa Score: 10    Anesthesia Post Evaluation    Patient location during evaluation: PACU  Patient participation: complete - patient participated  Level of consciousness: awake and alert  Pain score: 0  Airway patency: patent  Nausea & Vomiting: no nausea and no vomiting  Cardiovascular status: blood pressure returned to baseline  Respiratory status: acceptable  Hydration status: euvolemic  Pain management: adequate    No notable events documented.

## 2025-06-20 NOTE — PROGRESS NOTES
PACU Transfer to [facility]    Vitals:    06/20/25 1100   BP: 139/66   Pulse: 64   Resp: 23   Temp:    SpO2: 92%       No intake or output data in the 24 hours ending 06/20/25 1105    Pain assessment:  none  Pain Level: 0    Patient transferred to care of [facility] RN.    6/20/2025 11:05 AM

## 2025-06-20 NOTE — ANESTHESIA PRE PROCEDURE
Department of Anesthesiology  Preprocedure Note       Name:  Nimisha Joshi   Age:  79 y.o.  :  1945                                          MRN:  [id number]         Date:  2025      Surgeon: * No surgeons listed *    Procedure: * No procedures listed *    Medications prior to admission:   Prior to Admission medications    Medication Sig Start Date End Date Taking? Authorizing Provider   cyanocobalamin 1000 MCG/ML injection Inject 1 mL IM every 4 weeks for 6 months 3/12/25   Luciano Delarosa MD   albuterol sulfate HFA (PROVENTIL;VENTOLIN;PROAIR) 108 (90 Base) MCG/ACT inhaler Inhale 2 puffs into the lungs every 6 hours as needed 18   Abdullahi Meehan MD   meclizine (ANTIVERT) 12.5 MG tablet take one and 1 tablet 3 times a day as needed for dizziness 20   Abdullahi Meehan MD   omeprazole (PRILOSEC) 20 MG delayed release capsule TAKE 1 CAPSULE(S) BY MOUTH 2 X DAILY 20   Abdullahi Meehan MD   diclofenac sodium (VOLTAREN) 1 % GEL Apply 2 g topically 4 times daily for 14 days 2/28/24 3/3/25  Keli Quigley MD   brimonidine (ALPHAGAN) 0.2 % ophthalmic solution 1 drop 3 times daily    Abdullahi Meehan MD   solifenacin (VESICARE) 5 MG tablet Take 1 tablet by mouth daily    Abdullahi Meehan MD   ELIQUIS 5 MG TABS tablet Take 1 tablet by mouth 2 times daily 23   Abdullahi Meehan MD   HYDROcodone-acetaminophen (NORCO) 5-325 MG per tablet Take 1 tablet 3 times a day by oral route as needed for 30 days. 23   Abdullahi Meehan MD   LATANOPROST OP Apply to eye    Abdullahi Meehan MD   Cholecalciferol (VITAMIN D3 PO) Take by mouth    Abdullahi Meehan MD       Current medications:    Current Outpatient Medications   Medication Sig Dispense Refill    cyanocobalamin 1000 MCG/ML injection Inject 1 mL IM every 4 weeks for 6 months 6 mL 0    albuterol sulfate HFA (PROVENTIL;VENTOLIN;PROAIR) 108 (90 Base) MCG/ACT inhaler Inhale 2 puffs into the lungs

## 2025-06-20 NOTE — PROGRESS NOTES
Pt is alert and oriented and denies pain at this time, vital signs stable on room air. Tolerating a drink and snack. Discharge instructions given to Pt and nephew, all questions answered. Pt discharged to home with nephew to transport.

## 2025-06-27 DIAGNOSIS — R42 DIZZINESS: Primary | ICD-10-CM

## 2025-07-29 ENCOUNTER — LAB (OUTPATIENT)
Dept: NEUROLOGY | Age: 80
End: 2025-07-29
Payer: MEDICARE

## 2025-07-29 ENCOUNTER — OFFICE VISIT (OUTPATIENT)
Dept: NEUROLOGY | Age: 80
End: 2025-07-29
Payer: MEDICARE

## 2025-07-29 VITALS
DIASTOLIC BLOOD PRESSURE: 76 MMHG | HEART RATE: 64 BPM | BODY MASS INDEX: 33.47 KG/M2 | SYSTOLIC BLOOD PRESSURE: 143 MMHG | WEIGHT: 166 LBS | HEIGHT: 59 IN

## 2025-07-29 DIAGNOSIS — E53.8 VITAMIN B12 DEFICIENCY DISEASE: Primary | ICD-10-CM

## 2025-07-29 DIAGNOSIS — H81.93 VESTIBULAR DISORDERS, BILATERAL: ICD-10-CM

## 2025-07-29 DIAGNOSIS — E53.8 VITAMIN B12 DEFICIENCY DISEASE: ICD-10-CM

## 2025-07-29 DIAGNOSIS — R42 DIZZINESS: Primary | ICD-10-CM

## 2025-07-29 PROCEDURE — 1160F RVW MEDS BY RX/DR IN RCRD: CPT | Performed by: PSYCHIATRY & NEUROLOGY

## 2025-07-29 PROCEDURE — G8417 CALC BMI ABV UP PARAM F/U: HCPCS | Performed by: PSYCHIATRY & NEUROLOGY

## 2025-07-29 PROCEDURE — 96372 THER/PROPH/DIAG INJ SC/IM: CPT | Performed by: PSYCHIATRY & NEUROLOGY

## 2025-07-29 PROCEDURE — 1090F PRES/ABSN URINE INCON ASSESS: CPT | Performed by: PSYCHIATRY & NEUROLOGY

## 2025-07-29 PROCEDURE — 1123F ACP DISCUSS/DSCN MKR DOCD: CPT | Performed by: PSYCHIATRY & NEUROLOGY

## 2025-07-29 PROCEDURE — G8399 PT W/DXA RESULTS DOCUMENT: HCPCS | Performed by: PSYCHIATRY & NEUROLOGY

## 2025-07-29 PROCEDURE — 99213 OFFICE O/P EST LOW 20 MIN: CPT | Performed by: PSYCHIATRY & NEUROLOGY

## 2025-07-29 PROCEDURE — 1159F MED LIST DOCD IN RCRD: CPT | Performed by: PSYCHIATRY & NEUROLOGY

## 2025-07-29 PROCEDURE — 1036F TOBACCO NON-USER: CPT | Performed by: PSYCHIATRY & NEUROLOGY

## 2025-07-29 PROCEDURE — G8427 DOCREV CUR MEDS BY ELIG CLIN: HCPCS | Performed by: PSYCHIATRY & NEUROLOGY

## 2025-07-29 RX ORDER — TAMSULOSIN HYDROCHLORIDE 0.4 MG/1
CAPSULE ORAL
COMMUNITY

## 2025-07-29 RX ORDER — OXYCODONE HYDROCHLORIDE 10 MG/1
TABLET ORAL
COMMUNITY
Start: 2025-05-22

## 2025-07-29 RX ORDER — MELATONIN 10 MG
10 CAPSULE ORAL NIGHTLY
COMMUNITY

## 2025-07-29 RX ORDER — FAMOTIDINE 40 MG/1
TABLET, FILM COATED ORAL
COMMUNITY
Start: 2025-07-18

## 2025-07-29 RX ORDER — LISINOPRIL 5 MG/1
5 TABLET ORAL DAILY
COMMUNITY

## 2025-07-29 RX ORDER — CYANOCOBALAMIN 1000 UG/ML
1000 INJECTION, SOLUTION INTRAMUSCULAR; SUBCUTANEOUS ONCE
Status: COMPLETED | OUTPATIENT
Start: 2025-07-29 | End: 2025-07-29

## 2025-07-29 RX ADMIN — CYANOCOBALAMIN 1000 MCG: 1000 INJECTION, SOLUTION INTRAMUSCULAR; SUBCUTANEOUS at 12:00

## 2025-07-29 NOTE — PATIENT INSTRUCTIONS
YOU MUST CONFIRM YOUR APPOINTMENT 1 DAY PRIOR OR IT WILL BE CANCELLED!!   Our office will call you 3 times the day prior to your appointment in an attempt to confirm.  Please return our call ASAP or confirm your appt through [email or web address] no later than 3 pm the day before your appointment.  If we do not hear back from you by 3 pm to confirm, your appointment will be cancelled & someone will be added into that slot from our wait list.

## 2025-07-29 NOTE — PROGRESS NOTES
The patient came today for follow up regarding: chronic dizziness and headaches.       Since last visit, she had some side effect from Topamax with some tremors.  She stopped taking such medications.  Side effect improved.  She was admitted to the hospital today for hyperkalemia.    She reports less dizzy episodes since last visit.  Degree is moderate.  Duration is minutes.  Worse with movement.  Some nausea but no vomiting.  No falling or injury.  Now could be once every few weeks.  No severe headache, weakness or numbness or tingling.  No recent change in medications.  She is still taking B12 injection.  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  Scheduled to see vascular neurosurgeon.  She denies any pulsating tinnitus.  Other review of system was unremarkable.    Exam:   Constitutional:   Vitals:    07/29/25 1137   BP: (!) 143/76   Pulse: 64   Weight: 75.3 kg (166 lb)   Height: 1.499 m (4' 11.02\")       General appearance:  Normal development and appear in no acute distress.   Mental Status:   Oriented to person, place, problem, and time.    Memory: Good immediate recall.  Intact remote memory  Normal attention span and concentration.  Language: intact naming, repeating and fluency   Good fund of Knowledge. Aware of current events and vocabulary   Cranial Nerves: Pupil round regular and reactive, extraocular muscles were intact, no ophthalmoplegia, face is symmetric, tongue is midline and rest of cranial nerves are normal    Musculoskeletal: no focal weakness in UE or LL.   Reflexes: Symmetric 2+ in both arms and legs  Coordination:no abnormal movements or dysmetria  Sensation: normal in all extremities.  Gait/Posture: steady gait with normal posturing and station.  With her cane.  No changes.    ROS : A 10-14 system review of constitutional, cardiovascular, respiratory, GI, eyes, , ENT, musculoskeletal, endocrine, hematological, skin, SHEENT, genitourinary, psychiatric and neurologic systems

## 2025-08-19 DIAGNOSIS — E53.8 VITAMIN B12 DEFICIENCY DISEASE: ICD-10-CM

## 2025-08-19 RX ORDER — CYANOCOBALAMIN 1000 UG/ML
INJECTION, SOLUTION INTRAMUSCULAR; SUBCUTANEOUS
Qty: 6 ML | Refills: 0 | Status: SHIPPED | OUTPATIENT
Start: 2025-08-19

## 2025-09-02 ENCOUNTER — LAB (OUTPATIENT)
Dept: NEUROLOGY | Age: 80
End: 2025-09-02
Payer: MEDICARE

## 2025-09-02 DIAGNOSIS — E53.8 VITAMIN B12 DEFICIENCY DISEASE: Primary | ICD-10-CM

## 2025-09-02 PROCEDURE — 96372 THER/PROPH/DIAG INJ SC/IM: CPT | Performed by: PSYCHIATRY & NEUROLOGY

## 2025-09-02 PROCEDURE — 99999 PR OFFICE/OUTPT VISIT,PROCEDURE ONLY: CPT | Performed by: PSYCHIATRY & NEUROLOGY

## 2025-09-02 RX ORDER — CYANOCOBALAMIN 1000 UG/ML
1000 INJECTION, SOLUTION INTRAMUSCULAR; SUBCUTANEOUS ONCE
Status: COMPLETED | OUTPATIENT
Start: 2025-09-02 | End: 2025-09-02

## 2025-09-02 RX ADMIN — CYANOCOBALAMIN 1000 MCG: 1000 INJECTION, SOLUTION INTRAMUSCULAR; SUBCUTANEOUS at 13:19
